# Patient Record
Sex: FEMALE | Race: WHITE | NOT HISPANIC OR LATINO | Employment: UNEMPLOYED | URBAN - NONMETROPOLITAN AREA
[De-identification: names, ages, dates, MRNs, and addresses within clinical notes are randomized per-mention and may not be internally consistent; named-entity substitution may affect disease eponyms.]

---

## 2017-01-03 ENCOUNTER — OFFICE VISIT (OUTPATIENT)
Dept: CARDIOLOGY | Facility: CLINIC | Age: 52
End: 2017-01-03

## 2017-01-03 VITALS
SYSTOLIC BLOOD PRESSURE: 152 MMHG | HEART RATE: 64 BPM | OXYGEN SATURATION: 96 % | WEIGHT: 192.2 LBS | DIASTOLIC BLOOD PRESSURE: 97 MMHG | HEIGHT: 66 IN | BODY MASS INDEX: 30.89 KG/M2

## 2017-01-03 DIAGNOSIS — I10 ESSENTIAL HYPERTENSION: ICD-10-CM

## 2017-01-03 DIAGNOSIS — R06.02 SHORTNESS OF BREATH: ICD-10-CM

## 2017-01-03 DIAGNOSIS — I82.409 DEEP VEIN THROMBOSIS (DVT) (HCC): ICD-10-CM

## 2017-01-03 DIAGNOSIS — I25.10 CORONARY ARTERY DISEASE INVOLVING NATIVE CORONARY ARTERY OF NATIVE HEART WITHOUT ANGINA PECTORIS: Primary | ICD-10-CM

## 2017-01-03 PROCEDURE — 99213 OFFICE O/P EST LOW 20 MIN: CPT | Performed by: PHYSICIAN ASSISTANT

## 2017-01-03 RX ORDER — FUROSEMIDE 20 MG/1
20 TABLET ORAL DAILY
Refills: 11 | COMMUNITY
Start: 2016-12-13 | End: 2020-04-06 | Stop reason: SDUPTHER

## 2017-01-03 RX ORDER — WARFARIN SODIUM 4 MG/1
4 TABLET ORAL
COMMUNITY
End: 2021-12-10 | Stop reason: SDUPTHER

## 2017-01-03 RX ORDER — WARFARIN SODIUM 6 MG/1
6 TABLET ORAL
COMMUNITY
End: 2017-03-28 | Stop reason: SDUPTHER

## 2017-01-03 RX ORDER — NITROGLYCERIN 0.4 MG/1
0.4 TABLET SUBLINGUAL
COMMUNITY
Start: 2015-12-09

## 2017-01-03 RX ORDER — MELOXICAM 15 MG/1
15 TABLET ORAL DAILY
COMMUNITY
Start: 2017-01-01

## 2017-01-03 RX ORDER — METOPROLOL TARTRATE 50 MG/1
50 TABLET, FILM COATED ORAL 2 TIMES DAILY
Refills: 12 | COMMUNITY
Start: 2016-12-21 | End: 2021-12-09 | Stop reason: SDUPTHER

## 2017-01-03 RX ORDER — GABAPENTIN 600 MG/1
600 TABLET ORAL 2 TIMES DAILY
COMMUNITY
Start: 2017-01-01 | End: 2019-12-10

## 2017-01-03 RX ORDER — ASPIRIN 81 MG
81 TABLET, DELAYED RELEASE (ENTERIC COATED) ORAL 2 TIMES DAILY
COMMUNITY
Start: 2013-08-01

## 2017-01-03 NOTE — MR AVS SNAPSHOT
Vannessa Contreras   1/3/2017 8:45 AM   Office Visit    Dept Phone:  [phone]   Encounter #:  [id number]    Provider:  KELLIE Ge   Department:  [facility] CARDIOLOGY                Your Full Care Plan              Today's Medication Changes          These changes are accurate as of: 1/3/17  9:37 AM.  If you have any questions, ask your nurse or doctor.               Medication(s)that have changed:     * warfarin 6 MG tablet   Commonly known as:  COUMADIN   Take 6 mg by mouth. On Monday's, Wed's, Sat's   What changed:  Another medication with the same name was removed. Continue taking this medication, and follow the directions you see here.   Changed by:  KELLIE Ge       * warfarin 4 MG tablet   Commonly known as:  COUMADIN   Take 4 mg by mouth. Takes 6 mg M/W/Sat and 4 mg all other days   What changed:  Another medication with the same name was removed. Continue taking this medication, and follow the directions you see here.   Changed by:  KELLIE Ge       * Notice:  This list has 2 medication(s) that are the same as other medications prescribed for you. Read the directions carefully, and ask your doctor or other care provider to review them with you.               Your Updated Medication List          This list is accurate as of: 1/3/17  9:37 AM.  Always use your most recent med list.                ASPIRIN LOW DOSE 81 MG EC tablet   Generic drug:  aspirin       atorvastatin 40 MG tablet   Commonly known as:  LIPITOR   TAKE 1 TABLET BY MOUTH EVERY NIGHT AT BEDTIME       EQL FISH OIL 1200 MG capsule       furosemide 20 MG tablet   Commonly known as:  LASIX       gabapentin 600 MG tablet   Commonly known as:  NEURONTIN       meloxicam 15 MG tablet   Commonly known as:  MOBIC       metoprolol tartrate 50 MG tablet   Commonly known as:  LOPRESSOR       NITROSTAT 0.4 MG SL tablet   Generic drug:  nitroglycerin       * warfarin 6 MG tablet    "  Commonly known as:  COUMADIN       * warfarin 4 MG tablet   Commonly known as:  COUMADIN       * Notice:  This list has 2 medication(s) that are the same as other medications prescribed for you. Read the directions carefully, and ask your doctor or other care provider to review them with you.            We Performed the Following     Protime-INR       You Were Diagnosed With        Codes Comments    Coronary artery disease involving native coronary artery of native heart without angina pectoris    -  Primary ICD-10-CM: I25.10  ICD-9-CM: 414.01     Deep vein thrombosis (DVT)     ICD-10-CM: I82.409  ICD-9-CM: 453.40     Shortness of breath     ICD-10-CM: R06.02  ICD-9-CM: 786.05     Essential hypertension     ICD-10-CM: I10  ICD-9-CM: 401.9       Instructions     None    Patient Instructions History      Upcoming Appointments     Visit Type Date Time Department    FOLLOW UP 1/3/2017  8:45 AM [facility]      [employer] Signup     Our records indicate that you have declined [employer] signup. If you would like to sign up for [employer], please email [email or web address] or call [phone] to obtain an activation code.             Other Info from Your Visit           Allergies     No Known Allergies      Reason for Visit     Follow-up           Vital Signs     Blood Pressure Pulse Height Weight Oxygen Saturation Body Mass Index    152/97 (BP Location: Left arm, Patient Position: Sitting) 64 66\" (167.6 cm) 192 lb 3.2 oz (87.2 kg) 96% 31.02 kg/m2    Smoking Status                   Current Every Day Smoker           Problems and Diagnoses Noted     Coronary artery disease involving native coronary artery of native heart without angina pectoris    High blood pressure    Shortness of breath    Blood clot in vein            "

## 2017-01-03 NOTE — LETTER
January 3, 2017     Jc Lewis MD  [address] [place] [id number]    Patient: Vannessa Contreras   YOB: 1965   Date of Visit: 1/3/2017       Dear Dr. Debbie MD:    Vannessa Contreras was in my office today. Below is a copy of my note.    If you have questions, please do not hesitate to call me. I look forward to following Vannessa along with you.         Sincerely,        KELLIE Mcdaniels        CC: No Recipients    Problem list     Subjective   Vannessa Contreras is a 51 y.o. female     Chief Complaint   Patient presents with   • Follow-up       HPI    1. CAD   1.1. Nonobstructive Cath 2007  2. Hyperlipidemia  3. Hypertension  4. Palpitations  5. PVD   5.1 Bilateral aortobifemoral bypass 6/2007; thrombectomy rt aortofemoral graft 11/2009  6.Tobacco Habituation, uses e-cigarettes also  7. HX of DVT on coumadin    Patient is a 51-year-old female that presents back to office for routine follow-up. She has been doing remarkably well. She expresses no chest pain or pressure at rest or with exertion. She has mild dyspnea but this is chronic with no progression. She denies PND orthopnea. Occasional palpitations but denies dizziness, presyncope or syncope. She does not claudicate. She has had no evidence of bleeding on Coumadin. Otherwise voices no complaints      Outpatient Encounter Prescriptions as of 1/3/2017   Medication Sig Dispense Refill   • aspirin (ASPIRIN LOW DOSE) 81 MG EC tablet Take  by mouth 2 (Two) Times a Day.     • atorvastatin (LIPITOR) 40 MG tablet TAKE 1 TABLET BY MOUTH EVERY NIGHT AT BEDTIME 30 tablet 0   • furosemide (LASIX) 20 MG tablet Daily.  11   • gabapentin (NEURONTIN) 600 MG tablet 2 (Two) Times a Day.     • meloxicam (MOBIC) 15 MG tablet Daily.     • metoprolol tartrate (LOPRESSOR) 50 MG tablet 2 (Two) Times a Day.  12   • nitroglycerin (NITROSTAT) 0.4 MG SL tablet Place  under the tongue.     • Omega-3 Fatty Acids (EQL FISH OIL) 1200 MG capsule Take  by mouth 2  (Two) Times a Day.     • warfarin (COUMADIN) 4 MG tablet Take 4 mg by mouth. Takes 6 mg M/W/Sat and 4 mg all other days     • warfarin (COUMADIN) 6 MG tablet Take 6 mg by mouth. On Monday's, Wed's, Sat's     • [DISCONTINUED] warfarin (COUMADIN) 4 MG tablet TAKE 1 TABLET BY MOUTH DAILY EXCEPT ON WEDNESDAYS AND SATURDAYS (Patient taking differently: No sig reported) 30 tablet 0     No facility-administered encounter medications on file as of 1/3/2017.        Review of patient's allergies indicates no known allergies.    Past Medical History   Diagnosis Date   • Arterial insufficiency    • Atherosclerosis of arteries of extremities    • CAD (coronary artery disease)    • DVT (deep venous thrombosis)      h.o - on coumadin    • Fatigue    • Hyperlipidemia    • Hypertension    • Palpitations    • PVD (peripheral vascular disease)    • SOB (shortness of breath)        Social History     Social History   • Marital status:      Spouse name: N/A   • Number of children: N/A   • Years of education: N/A     Occupational History   • Not on file.     Social History Main Topics   • Smoking status: Current Every Day Smoker     Packs/day: 1.00   • Smokeless tobacco: Not on file      Comment: also states former smoker 03/03/16   • Alcohol use Yes      Comment: Social    • Drug use: Not on file   • Sexual activity: Not on file     Other Topics Concern   • Not on file     Social History Narrative       Family History   Problem Relation Age of Onset   • Atrial fibrillation Mother    • Heart attack Father    • Diabetes Sister    • Hypertension Sister    • Stroke Maternal Grandmother    • Peripheral vascular disease Other        Review of Systems   Constitutional: Positive for fatigue.   HENT: Negative.    Eyes: Negative.    Respiratory: Positive for shortness of breath.    Cardiovascular: Positive for leg swelling (mildly). Negative for chest pain and palpitations.   Gastrointestinal: Positive for diarrhea.   Endocrine: Negative.   "  Genitourinary: Negative.    Musculoskeletal: Positive for arthralgias and myalgias.   Skin: Negative.    Allergic/Immunologic: Negative.    Neurological: Negative.    Hematological: Negative.    Psychiatric/Behavioral: Negative.        Objective     Visit Vitals   • /97 (BP Location: Left arm, Patient Position: Sitting)   • Pulse 64   • Ht 66\" (167.6 cm)   • Wt 192 lb 3.2 oz (87.2 kg)   • SpO2 96%   • BMI 31.02 kg/m2       Lab Results (most recent)     None          Physical Exam   Constitutional: She is oriented to person, place, and time. She appears well-developed and well-nourished. No distress.   HENT:   Head: Normocephalic and atraumatic.   Eyes: EOM are normal. Pupils are equal, round, and reactive to light.   Neck: No JVD present.   Cardiovascular: Normal rate, regular rhythm and normal heart sounds.  Exam reveals no gallop and no friction rub.    No murmur heard.  Pulmonary/Chest: Effort normal and breath sounds normal. No respiratory distress. She has no wheezes. She has no rales. She exhibits no tenderness.   Musculoskeletal: Normal range of motion. She exhibits no edema.   Neurological: She is alert and oriented to person, place, and time. No cranial nerve deficit.   Skin: Skin is warm and dry. No rash noted. No erythema. No pallor.   Psychiatric: She has a normal mood and affect. Her behavior is normal.   Nursing note and vitals reviewed.      Procedure   Procedures       Assessment/Plan     Problems Addressed this Visit        Cardiovascular and Mediastinum    Coronary artery disease involving native coronary artery of native heart without angina pectoris - Primary    Relevant Medications    metoprolol tartrate (LOPRESSOR) 50 MG tablet    nitroglycerin (NITROSTAT) 0.4 MG SL tablet    Other Relevant Orders    Comprehensive Metabolic Panel    Lipid Panel    Essential hypertension    Relevant Medications    furosemide (LASIX) 20 MG tablet    metoprolol tartrate (LOPRESSOR) 50 MG tablet    Other " Relevant Orders    Comprehensive Metabolic Panel    Lipid Panel       Respiratory    Shortness of breath    Relevant Orders    Comprehensive Metabolic Panel    Lipid Panel      Other Visit Diagnoses     Deep vein thrombosis (DVT)        Relevant Orders    Comprehensive Metabolic Panel    Lipid Panel              Recommendations  1. Patient doing well from cardiac standpoint. She denies symptoms of angina or failure. Dysrhythmic symptoms are only mild at this point. She has no claudication symptoms nor evidence of bleeding on Coumadin. We will give her a lab slip to recheck her liver enzymes and lipid status. Otherwise we will see her back for follow-up in 6 months. Follow-up primary as scheduled.

## 2017-01-03 NOTE — PROGRESS NOTES
Problem list     Subjective   Vannessa Contreras is a 51 y.o. female     Chief Complaint   Patient presents with   • Follow-up       HPI    1. CAD   1.1. Nonobstructive Cath 2007  2. Hyperlipidemia  3. Hypertension  4. Palpitations  5. PVD   5.1 Bilateral aortobifemoral bypass 6/2007; thrombectomy rt aortofemoral graft 11/2009  6.Tobacco Habituation, uses e-cigarettes also  7. HX of DVT on coumadin    Patient is a 51-year-old female that presents back to office for routine follow-up. She has been doing remarkably well. She expresses no chest pain or pressure at rest or with exertion. She has mild dyspnea but this is chronic with no progression. She denies PND orthopnea. Occasional palpitations but denies dizziness, presyncope or syncope. She does not claudicate. She has had no evidence of bleeding on Coumadin. Otherwise voices no complaints      Outpatient Encounter Prescriptions as of 1/3/2017   Medication Sig Dispense Refill   • aspirin (ASPIRIN LOW DOSE) 81 MG EC tablet Take  by mouth 2 (Two) Times a Day.     • atorvastatin (LIPITOR) 40 MG tablet TAKE 1 TABLET BY MOUTH EVERY NIGHT AT BEDTIME 30 tablet 0   • furosemide (LASIX) 20 MG tablet Daily.  11   • gabapentin (NEURONTIN) 600 MG tablet 2 (Two) Times a Day.     • meloxicam (MOBIC) 15 MG tablet Daily.     • metoprolol tartrate (LOPRESSOR) 50 MG tablet 2 (Two) Times a Day.  12   • nitroglycerin (NITROSTAT) 0.4 MG SL tablet Place  under the tongue.     • Omega-3 Fatty Acids (EQL FISH OIL) 1200 MG capsule Take  by mouth 2 (Two) Times a Day.     • warfarin (COUMADIN) 4 MG tablet Take 4 mg by mouth. Takes 6 mg M/W/Sat and 4 mg all other days     • warfarin (COUMADIN) 6 MG tablet Take 6 mg by mouth. On Monday's, Wed's, Sat's     • [DISCONTINUED] warfarin (COUMADIN) 4 MG tablet TAKE 1 TABLET BY MOUTH DAILY EXCEPT ON WEDNESDAYS AND SATURDAYS (Patient taking differently: No sig reported) 30 tablet 0     No facility-administered encounter medications on file as of  "1/3/2017.        Review of patient's allergies indicates no known allergies.    Past Medical History   Diagnosis Date   • Arterial insufficiency    • Atherosclerosis of arteries of extremities    • CAD (coronary artery disease)    • DVT (deep venous thrombosis)      h.o - on coumadin    • Fatigue    • Hyperlipidemia    • Hypertension    • Palpitations    • PVD (peripheral vascular disease)    • SOB (shortness of breath)        Social History     Social History   • Marital status:      Spouse name: N/A   • Number of children: N/A   • Years of education: N/A     Occupational History   • Not on file.     Social History Main Topics   • Smoking status: Current Every Day Smoker     Packs/day: 1.00   • Smokeless tobacco: Not on file      Comment: also states former smoker 03/03/16   • Alcohol use Yes      Comment: Social    • Drug use: Not on file   • Sexual activity: Not on file     Other Topics Concern   • Not on file     Social History Narrative       Family History   Problem Relation Age of Onset   • Atrial fibrillation Mother    • Heart attack Father    • Diabetes Sister    • Hypertension Sister    • Stroke Maternal Grandmother    • Peripheral vascular disease Other        Review of Systems   Constitutional: Positive for fatigue.   HENT: Negative.    Eyes: Negative.    Respiratory: Positive for shortness of breath.    Cardiovascular: Positive for leg swelling (mildly). Negative for chest pain and palpitations.   Gastrointestinal: Positive for diarrhea.   Endocrine: Negative.    Genitourinary: Negative.    Musculoskeletal: Positive for arthralgias and myalgias.   Skin: Negative.    Allergic/Immunologic: Negative.    Neurological: Negative.    Hematological: Negative.    Psychiatric/Behavioral: Negative.        Objective     Visit Vitals   • /97 (BP Location: Left arm, Patient Position: Sitting)   • Pulse 64   • Ht 66\" (167.6 cm)   • Wt 192 lb 3.2 oz (87.2 kg)   • SpO2 96%   • BMI 31.02 kg/m2       Lab " Results (most recent)     None          Physical Exam   Constitutional: She is oriented to person, place, and time. She appears well-developed and well-nourished. No distress.   HENT:   Head: Normocephalic and atraumatic.   Eyes: EOM are normal. Pupils are equal, round, and reactive to light.   Neck: No JVD present.   Cardiovascular: Normal rate, regular rhythm and normal heart sounds.  Exam reveals no gallop and no friction rub.    No murmur heard.  Pulmonary/Chest: Effort normal and breath sounds normal. No respiratory distress. She has no wheezes. She has no rales. She exhibits no tenderness.   Musculoskeletal: Normal range of motion. She exhibits no edema.   Neurological: She is alert and oriented to person, place, and time. No cranial nerve deficit.   Skin: Skin is warm and dry. No rash noted. No erythema. No pallor.   Psychiatric: She has a normal mood and affect. Her behavior is normal.   Nursing note and vitals reviewed.      Procedure   Procedures       Assessment/Plan     Problems Addressed this Visit        Cardiovascular and Mediastinum    Coronary artery disease involving native coronary artery of native heart without angina pectoris - Primary    Relevant Medications    metoprolol tartrate (LOPRESSOR) 50 MG tablet    nitroglycerin (NITROSTAT) 0.4 MG SL tablet    Other Relevant Orders    Comprehensive Metabolic Panel    Lipid Panel    Essential hypertension    Relevant Medications    furosemide (LASIX) 20 MG tablet    metoprolol tartrate (LOPRESSOR) 50 MG tablet    Other Relevant Orders    Comprehensive Metabolic Panel    Lipid Panel       Respiratory    Shortness of breath    Relevant Orders    Comprehensive Metabolic Panel    Lipid Panel      Other Visit Diagnoses     Deep vein thrombosis (DVT)        Relevant Orders    Comprehensive Metabolic Panel    Lipid Panel              Recommendations  1. Patient doing well from cardiac standpoint. She denies symptoms of angina or failure. Dysrhythmic symptoms  are only mild at this point. She has no claudication symptoms nor evidence of bleeding on Coumadin. We will give her a lab slip to recheck her liver enzymes and lipid status. Otherwise we will see her back for follow-up in 6 months. Follow-up primary as scheduled.

## 2017-01-05 ENCOUNTER — DOCUMENTATION (OUTPATIENT)
Dept: CARDIOLOGY | Facility: CLINIC | Age: 52
End: 2017-01-05

## 2017-01-05 NOTE — PROGRESS NOTES
Called to remind patient her INR is past due and stated she was going next week to get it drawn because she has other labs to get drawn. PH,LPN

## 2017-01-10 ENCOUNTER — ANTICOAGULATION VISIT (OUTPATIENT)
Dept: CARDIOLOGY | Facility: CLINIC | Age: 52
End: 2017-01-10

## 2017-01-10 LAB — INR PPP: 4.81 (ref 2–3)

## 2017-01-10 NOTE — PROGRESS NOTES
Per Ricardo Rhodes, PAC hold x 2 days then resume previous dosing and recheck level in 1-2 weeks. Patient aware, verbalized ok. PH,LPN

## 2017-01-25 ENCOUNTER — ANTICOAGULATION VISIT (OUTPATIENT)
Dept: CARDIOLOGY | Facility: CLINIC | Age: 52
End: 2017-01-25

## 2017-01-25 LAB — INR PPP: 2 (ref 2–3)

## 2017-01-25 NOTE — PROGRESS NOTES
Per Ricardo Rhodes, PAC no dosage change, recheck level in 2 weeks. Patient aware, verbalized ok. PH,LPN

## 2017-02-23 ENCOUNTER — DOCUMENTATION (OUTPATIENT)
Dept: CARDIOLOGY | Facility: CLINIC | Age: 52
End: 2017-02-23

## 2017-02-27 ENCOUNTER — ANTICOAGULATION VISIT (OUTPATIENT)
Dept: CARDIOLOGY | Facility: CLINIC | Age: 52
End: 2017-02-27

## 2017-02-27 LAB — INR PPP: 2.1 (ref 2–3)

## 2017-02-27 NOTE — PROGRESS NOTES
Per Ricardo Rhodes, PAC no dosage change, recheck level in 3 weeks. Patient aware, verbalized ok. PH,LPN

## 2017-03-07 DIAGNOSIS — I82.409 DEEP VEIN THROMBOSIS (DVT) OF LOWER EXTREMITY, UNSPECIFIED CHRONICITY, UNSPECIFIED LATERALITY, UNSPECIFIED VEIN (HCC): Primary | ICD-10-CM

## 2017-03-28 DIAGNOSIS — I82.409 DEEP VEIN THROMBOSIS (DVT) OF LOWER EXTREMITY, UNSPECIFIED CHRONICITY, UNSPECIFIED LATERALITY, UNSPECIFIED VEIN (HCC): Primary | ICD-10-CM

## 2017-03-28 RX ORDER — WARFARIN SODIUM 6 MG/1
TABLET ORAL
Qty: 30 TABLET | Refills: 5 | Status: SHIPPED | OUTPATIENT
Start: 2017-03-28 | End: 2021-12-10 | Stop reason: SDUPTHER

## 2017-03-29 ENCOUNTER — ANTICOAGULATION VISIT (OUTPATIENT)
Dept: CARDIOLOGY | Facility: CLINIC | Age: 52
End: 2017-03-29

## 2017-03-29 LAB — INR PPP: 3.5 (ref 2–3)

## 2017-04-26 ENCOUNTER — ANTICOAGULATION VISIT (OUTPATIENT)
Dept: CARDIOLOGY | Facility: CLINIC | Age: 52
End: 2017-04-26

## 2017-04-26 LAB — INR PPP: 5.5 (ref 2–3)

## 2017-04-26 NOTE — PROGRESS NOTES
Patient is on augmentin 875 mg po bid and cough syrup since last Thursday and is to take it x 10 days. Per Ricardo Rhodes, PAC hold x 2 days then resume previous dosing and recheck level 3 days after last abx. Dose ( Wed.). Patient aware, verbalized ok. PH,LPN

## 2017-05-03 LAB — INR PPP: 2.5 (ref 2–3)

## 2017-05-04 ENCOUNTER — ANTICOAGULATION VISIT (OUTPATIENT)
Dept: CARDIOLOGY | Facility: CLINIC | Age: 52
End: 2017-05-04

## 2017-05-19 LAB — INR PPP: 3 (ref 2–3)

## 2017-05-22 ENCOUNTER — ANTICOAGULATION VISIT (OUTPATIENT)
Dept: CARDIOLOGY | Facility: CLINIC | Age: 52
End: 2017-05-22

## 2017-06-02 LAB — INR PPP: 3 (ref 2–3)

## 2017-06-05 ENCOUNTER — ANTICOAGULATION VISIT (OUTPATIENT)
Dept: CARDIOLOGY | Facility: CLINIC | Age: 52
End: 2017-06-05

## 2017-06-05 NOTE — PROGRESS NOTES
Per Ricardo Rhodes, PAC no dosage change, recheck level in 2-3 weeks. Verbal orders read back and verified by RONALDO Pak. Patient aware, verbalized ok. PH,LPN

## 2017-06-29 ENCOUNTER — ANTICOAGULATION VISIT (OUTPATIENT)
Dept: CARDIOLOGY | Facility: CLINIC | Age: 52
End: 2017-06-29

## 2017-06-29 LAB — INR PPP: 4.7 (ref 2–3)

## 2017-06-29 NOTE — PROGRESS NOTES
Per Ricardo Rhodes, PAC hold x 2 days then resume previous dosing and recheck level in 2 weeks. Verbal orders read back and verified by RONALDO Pak. Patient aware, verbalized ok. PH,LPN

## 2017-07-03 ENCOUNTER — OFFICE VISIT (OUTPATIENT)
Dept: CARDIOLOGY | Facility: CLINIC | Age: 52
End: 2017-07-03

## 2017-07-03 VITALS
HEIGHT: 66 IN | WEIGHT: 190.8 LBS | BODY MASS INDEX: 30.67 KG/M2 | SYSTOLIC BLOOD PRESSURE: 118 MMHG | DIASTOLIC BLOOD PRESSURE: 75 MMHG | OXYGEN SATURATION: 98 % | HEART RATE: 62 BPM

## 2017-07-03 DIAGNOSIS — I25.10 CORONARY ARTERY DISEASE INVOLVING NATIVE CORONARY ARTERY OF NATIVE HEART WITHOUT ANGINA PECTORIS: ICD-10-CM

## 2017-07-03 DIAGNOSIS — R09.89 CAROTID BRUIT, UNSPECIFIED LATERALITY: ICD-10-CM

## 2017-07-03 DIAGNOSIS — R06.02 SHORTNESS OF BREATH: Primary | ICD-10-CM

## 2017-07-03 DIAGNOSIS — R07.9 CHEST PAIN, UNSPECIFIED TYPE: ICD-10-CM

## 2017-07-03 PROCEDURE — 99214 OFFICE O/P EST MOD 30 MIN: CPT | Performed by: PHYSICIAN ASSISTANT

## 2017-07-03 NOTE — PROGRESS NOTES
"Problem list     Subjective   Vannessa Contreras is a 51 y.o. female     Chief Complaint   Patient presents with   • Follow-up     presents as a follow up       HPI    1. CAD   1.1. Nonobstructive Cath 2007  2. Hyperlipidemia  3. Hypertension  4. Palpitations  5. PVD   5.1 Bilateral aortobifemoral bypass 6/2007; thrombectomy rt aortofemoral graft 11/2009  6.Tobacco Habituation, uses e-cigarettes also  7. HX of DVT on coumadin    Patient is a 51-year-old female that presents back for routine cardiac follow-up. She has done remarkably well. She has chronic chest discomfort and describes an atypically. For instance, she gets a sharp pain or a \"twinge\" in her chest that last for seconds and subside. It is atypical for angina. She has mild to moderate dyspnea and she feels that is worsened. She describes more wheezing recently. She denies PND orthopnea. She does not palpitate or have dysrhythmic symptoms. Otherwise, she is doing well    Outpatient Encounter Prescriptions as of 7/3/2017   Medication Sig Dispense Refill   • aspirin (ASPIRIN LOW DOSE) 81 MG EC tablet Take 81 mg by mouth 2 (Two) Times a Day.     • atorvastatin (LIPITOR) 40 MG tablet TAKE 1 TABLET BY MOUTH EVERY NIGHT AT BEDTIME 30 tablet 0   • furosemide (LASIX) 20 MG tablet Take 20 mg by mouth Daily.  11   • gabapentin (NEURONTIN) 600 MG tablet Take 600 mg by mouth 2 (Two) Times a Day.     • meloxicam (MOBIC) 15 MG tablet Take 15 mg by mouth Daily.     • metoprolol tartrate (LOPRESSOR) 50 MG tablet Take 50 mg by mouth 2 (Two) Times a Day.  12   • nitroglycerin (NITROSTAT) 0.4 MG SL tablet Place 0.4 mg under the tongue Every 5 (Five) Minutes As Needed.     • Omega-3 Fatty Acids (EQL FISH OIL) 1200 MG capsule Take 1 tablet by mouth 2 (Two) Times a Day.     • warfarin (COUMADIN) 4 MG tablet Take 4 mg by mouth. Takes 6 mg M/W/Sat and 4 mg all other days     • warfarin (COUMADIN) 6 MG tablet TAKE 1 TABLET BY MOUTH EVERY WEDNESDAY 30 tablet 5     No " "facility-administered encounter medications on file as of 7/3/2017.        Review of patient's allergies indicates no known allergies.    Past Medical History:   Diagnosis Date   • Arterial insufficiency    • Atherosclerosis of arteries of extremities    • CAD (coronary artery disease)    • DVT (deep venous thrombosis)     h.o - on coumadin    • Fatigue    • Hyperlipidemia    • Hypertension    • Palpitations    • PVD (peripheral vascular disease)    • SOB (shortness of breath)        Social History     Social History   • Marital status:      Spouse name: N/A   • Number of children: N/A   • Years of education: N/A     Occupational History   • Not on file.     Social History Main Topics   • Smoking status: Current Every Day Smoker     Packs/day: 1.00   • Smokeless tobacco: Not on file      Comment: also states former smoker 03/03/16   • Alcohol use Yes      Comment: Social    • Drug use: Not on file   • Sexual activity: Not on file     Other Topics Concern   • Not on file     Social History Narrative       Family History   Problem Relation Age of Onset   • Atrial fibrillation Mother    • Heart attack Father    • Diabetes Sister    • Hypertension Sister    • Stroke Maternal Grandmother    • Peripheral vascular disease Other        Review of Systems   Constitutional: Positive for fatigue.   HENT: Positive for sinus pressure.    Eyes: Negative.    Respiratory: Positive for shortness of breath.    Cardiovascular: Positive for chest pain.   Gastrointestinal: Negative.    Endocrine: Negative.    Genitourinary: Positive for frequency.   Musculoskeletal: Positive for arthralgias.   Skin: Negative.    Allergic/Immunologic: Positive for environmental allergies.   Neurological: Negative.    Hematological: Negative.    Psychiatric/Behavioral: Negative.        Objective     /75 (BP Location: Left arm, Patient Position: Sitting)  Pulse 62  Ht 66\" (167.6 cm)  Wt 190 lb 12.8 oz (86.5 kg)  SpO2 98%  BMI 30.8 " kg/m2    Lab Results (most recent)     None          Physical Exam   Constitutional: She is oriented to person, place, and time. She appears well-developed and well-nourished. No distress.   HENT:   Head: Normocephalic and atraumatic.   Eyes: EOM are normal. Pupils are equal, round, and reactive to light.   Neck: No JVD present.   Cardiovascular: Normal rate, regular rhythm and normal heart sounds.  Exam reveals no gallop and no friction rub.    No murmur heard.  Pulmonary/Chest: Effort normal and breath sounds normal. No respiratory distress. She has no wheezes. She has no rales. She exhibits no tenderness.   Abdominal: Soft. She exhibits no distension.   Musculoskeletal: Normal range of motion. She exhibits no edema.   Neurological: She is alert and oriented to person, place, and time. No cranial nerve deficit.   Skin: Skin is warm and dry. No rash noted. No erythema. No pallor.   Psychiatric: She has a normal mood and affect. Her behavior is normal.   Nursing note and vitals reviewed.      Procedure   Procedures       Assessment/Plan     Problems Addressed this Visit        Cardiovascular and Mediastinum    Coronary artery disease involving native coronary artery of native heart without angina pectoris    Relevant Orders    Pulmonary Function Test    US Carotid Bilateral       Respiratory    Shortness of breath - Primary    Relevant Orders    Pulmonary Function Test    US Carotid Bilateral       Nervous and Auditory    Chest pain    Relevant Orders    Pulmonary Function Test    US Carotid Bilateral      Other Visit Diagnoses     Carotid bruit, unspecified laterality        Relevant Orders    Pulmonary Function Test    US Carotid Bilateral              Recommendations  1. Patient has chronic dyspnea and complaints of more wheezing. She has never had pulmonary function studies performed. I would like to set up at Dr. Mcgrath's office to look for any evidence of restrictive, obstructive, or reactive airway  disease.  2. Chest pain is atypical at this time and only had very minimal atherosclerosis on last catheterization. I do not feel further workup is indicated.  3. She is on appropriate medications for coronary disease including aspirin and statin therapy.  4. She is on chronic anticoagulation for DVT. She has had no evidence of bleeding.  5. She request a carotid duplex for reevaluation. We will see her back for follow-up after testing. Follow-up primary as scheduled

## 2017-07-19 ENCOUNTER — ANTICOAGULATION VISIT (OUTPATIENT)
Dept: CARDIOLOGY | Facility: CLINIC | Age: 52
End: 2017-07-19

## 2017-07-19 LAB — INR PPP: 2.2 (ref 2–3)

## 2017-07-25 ENCOUNTER — HOSPITAL ENCOUNTER (OUTPATIENT)
Dept: CARDIOLOGY | Facility: HOSPITAL | Age: 52
Discharge: HOME | End: 2017-07-25
Admitting: PHYSICIAN ASSISTANT

## 2017-07-25 PROCEDURE — 93880 EXTRACRANIAL BILAT STUDY: CPT | Performed by: INTERNAL MEDICINE

## 2017-07-25 PROCEDURE — 93880 EXTRACRANIAL BILAT STUDY: CPT

## 2017-08-10 LAB — INR PPP: 3.7 (ref 2–3)

## 2017-08-11 ENCOUNTER — ANTICOAGULATION VISIT (OUTPATIENT)
Dept: CARDIOLOGY | Facility: CLINIC | Age: 52
End: 2017-08-11

## 2017-08-11 ENCOUNTER — TELEPHONE (OUTPATIENT)
Dept: CARDIOLOGY | Facility: CLINIC | Age: 52
End: 2017-08-11

## 2017-08-11 DIAGNOSIS — I82.4Y9 DEEP VEIN THROMBOSIS (DVT) OF PROXIMAL LOWER EXTREMITY, UNSPECIFIED CHRONICITY, UNSPECIFIED LATERALITY (HCC): Primary | ICD-10-CM

## 2017-08-11 NOTE — PROGRESS NOTES
Per Ricardo Rhodes, PAC hold today's dose then resume previous dosing and recheck level in 2 weeks. Verbal orders read back and verified by Ricardo Rhodes, PAC. Patient aware, verbalized ok. PH,LPN

## 2017-09-12 ENCOUNTER — DOCUMENTATION (OUTPATIENT)
Dept: CARDIOLOGY | Facility: CLINIC | Age: 52
End: 2017-09-12

## 2017-09-12 NOTE — PROGRESS NOTES
Called and reminded patient INR is past due. Stated she would try and go Friday am and asked that I  Send standing order to [facility] since [facility] closed. Order faxed. CATIE,LPN

## 2017-09-15 LAB — INR PPP: 0.99 (ref 2–3)

## 2017-09-18 ENCOUNTER — ANTICOAGULATION VISIT (OUTPATIENT)
Dept: CARDIOLOGY | Facility: CLINIC | Age: 52
End: 2017-09-18

## 2017-09-18 NOTE — PROGRESS NOTES
NO DOSAGE CHANGE AND RECHECK LEVEL IN 2 WEEKS PER TIERA GARCIA, PAC. INR ADDRESSED WITH PATIENT BY KATIE COLINDRES. PH,LPN

## 2017-09-29 LAB — INR PPP: 1.26 (ref 2–3)

## 2017-10-02 ENCOUNTER — ANTICOAGULATION VISIT (OUTPATIENT)
Dept: CARDIOLOGY | Facility: CLINIC | Age: 52
End: 2017-10-02

## 2017-10-02 NOTE — PROGRESS NOTES
PER TIERA GARCIA, PAC CHANGE TO 6 MG M,W,F,SAT AND 4 MG ALL OTHER DAYS AND RECHECK LEVEL IN 7-10 DAYS. ALL VERBAL ORDERS PER RONALDO VARELA AND ADDRESSED WITH HIM BY KATIE COLINDRES. PATIENT WAS NOTIFIED. PH,LPN

## 2017-10-06 ENCOUNTER — ANTICOAGULATION VISIT (OUTPATIENT)
Dept: CARDIOLOGY | Facility: CLINIC | Age: 52
End: 2017-10-06

## 2017-10-06 LAB — INR PPP: 3.09 (ref 2–3)

## 2017-10-06 NOTE — PROGRESS NOTES
NO DOSAGE CHANGE AND RECHECK LEVEL IN 1-2 WEEKS PER TIERA GARCIA, PAC. VERBAL ORDERS READ BACK AND VERIFIED BY TIERA GARCIA PAC. PATIENT AWARE VERBALIZED OK. PH,LPN

## 2017-10-23 ENCOUNTER — ANTICOAGULATION VISIT (OUTPATIENT)
Dept: CARDIOLOGY | Facility: CLINIC | Age: 52
End: 2017-10-23

## 2017-10-23 LAB — INR PPP: 2.6 (ref 2–3)

## 2017-10-23 NOTE — PROGRESS NOTES
NO DOSAGE CHANGE AND RECHECK LEVEL IN 2-3 WEEKS PER TIERA GARCIA, PAC. VERBAL ORDERS READ BACK AND VERIFIED BY TIERA GARCIA PAC. PATIENT AWARE VERBALIZED OK. PH,LPN

## 2017-11-22 LAB — INR PPP: 2.44 (ref 2–3)

## 2017-11-26 ENCOUNTER — ANTICOAGULATION VISIT (OUTPATIENT)
Dept: CARDIOLOGY | Facility: CLINIC | Age: 52
End: 2017-11-26

## 2017-11-26 NOTE — PROGRESS NOTES
NO DOSAGE CHANGE AND RECHECK LEVEL IN 2-3 WEEKS PER RONALDO VARELA. VERBAL ORDERS READ BACK AND VERIFIED BY RONALDO VARELA. ABOVE ADDRESSED WITH RONALDO VARELA BY KATIE COLINDRES. PH,LPN

## 2017-12-14 ENCOUNTER — ANTICOAGULATION VISIT (OUTPATIENT)
Dept: CARDIOLOGY | Facility: CLINIC | Age: 52
End: 2017-12-14

## 2017-12-14 LAB
INR PPP: 1.86 (ref 2–3)
INR PPP: 3.9 (ref 2–3)

## 2017-12-14 NOTE — PROGRESS NOTES
PER TIERA GARCIA, PAC HOLD TODAYS DOSE, ONLY TAKE 3 MG VANNESSA. THEN RESUME PREVIOUS DOSING  AND RECHECK LEVEL IN 2WEEKS. VERBAL ORDERS READ BACK AND VERIFIED BY RONALDO VARELA. PATIENT AWARE, VERBALIZED OK. PH,LPN

## 2018-01-05 LAB — INR PPP: 3.48 (ref 2–3)

## 2018-01-08 ENCOUNTER — ANTICOAGULATION VISIT (OUTPATIENT)
Dept: CARDIOLOGY | Facility: CLINIC | Age: 53
End: 2018-01-08

## 2018-01-08 NOTE — PROGRESS NOTES
PER TIERA GARCIA, PAC HOLD TODAY'S DOSE THEN RESUME PREVIOUS DOSING AND RECHECK LEVEL IN 2 WEEKS. VERBAL ORDERS READ BACK AND VERIFIED BY TIERA GARCIA, PAC. PATIENT AWARE, VERBALIZED OK. PH,LPN

## 2018-01-18 ENCOUNTER — ANTICOAGULATION VISIT (OUTPATIENT)
Dept: CARDIOLOGY | Facility: CLINIC | Age: 53
End: 2018-01-18

## 2018-01-18 LAB — INR PPP: 3.46 (ref 2–3)

## 2018-01-18 NOTE — PROGRESS NOTES
PER TIERA GARCIA, PAC CHANGE TO 6 MG M/W/F AND 4 MG ALL OTHER DAYS AND RECHECK LEVEL IN 2 WEEKS. VERBAL ORDERS READ BACK AND VERIFIED BY RONALDO VARELA. PATIENT AWARE, VERBALIZED OK. PH,LPN

## 2018-01-23 DIAGNOSIS — I82.90 DEEP VEIN THROMBOSIS (DVT) OF NON-EXTREMITY VEIN, UNSPECIFIED CHRONICITY: Primary | ICD-10-CM

## 2018-02-16 LAB
INR PPP: 1.53 (ref 2–3)
INR PPP: 1.59 (ref 2–3)

## 2018-02-19 ENCOUNTER — ANTICOAGULATION VISIT (OUTPATIENT)
Dept: CARDIOLOGY | Facility: CLINIC | Age: 53
End: 2018-02-19

## 2018-02-28 ENCOUNTER — ANTICOAGULATION VISIT (OUTPATIENT)
Dept: CARDIOLOGY | Facility: CLINIC | Age: 53
End: 2018-02-28

## 2018-02-28 LAB — INR PPP: 2.46 (ref 2–3)

## 2018-02-28 NOTE — PROGRESS NOTES
NO DOSAGE CHANGE AND RECHECK LEVEL IN 2 WEEKS PER TIERA GARCIA, PAC. VERBAL ORDERS READ BACK AND VERIFIED BY TIERA GARCIA, PAC. PATIENT AWARE VERBALIZED OK. PH,LPN

## 2018-03-29 ENCOUNTER — ANTICOAGULATION VISIT (OUTPATIENT)
Dept: CARDIOLOGY | Facility: CLINIC | Age: 53
End: 2018-03-29

## 2018-03-29 LAB — INR PPP: 1.93 (ref 2–3)

## 2018-04-19 ENCOUNTER — ANTICOAGULATION VISIT (OUTPATIENT)
Dept: CARDIOLOGY | Facility: CLINIC | Age: 53
End: 2018-04-19

## 2018-04-19 LAB — INR PPP: 2.69 (ref 2–3)

## 2018-04-19 NOTE — PROGRESS NOTES
NO DOSAGE CHANGE AND RECHECK LEVEL IN 1 MO. PER TIERA GARCIA, PAC. VERBAL ORDERS READ BACK AND VERIFIED BY TIERA GARCIA, PAC. PATIENT AWARE VERBALIZED OK. PH,LPN

## 2018-05-22 LAB — INR PPP: 2.85 (ref 2–3)

## 2018-06-06 ENCOUNTER — ANTICOAGULATION VISIT (OUTPATIENT)
Dept: CARDIOLOGY | Facility: CLINIC | Age: 53
End: 2018-06-06

## 2018-06-06 NOTE — PROGRESS NOTES
INR RESULTS JUST RECEIVED IN OFFICE TODAY. NO DOSAGE CHANGE AND RECHECK LEVEL IN 1 MO.PER TIERA GARCIA, PAC. VERBAL ORDERS READ BACK AND VERIFIED BY TIERA GARCIA, PAC. PATIENT AWARE VERBALIZED OK. CATIE,LPN

## 2018-06-26 ENCOUNTER — ANTICOAGULATION VISIT (OUTPATIENT)
Dept: CARDIOLOGY | Facility: CLINIC | Age: 53
End: 2018-06-26

## 2018-06-26 ENCOUNTER — DOCUMENTATION (OUTPATIENT)
Dept: CARDIOLOGY | Facility: CLINIC | Age: 53
End: 2018-06-26

## 2018-06-26 LAB — INR PPP: 2.54 (ref 2–3)

## 2018-06-26 NOTE — PROGRESS NOTES
NO DOSAGE CHANGE AND RECHECK LEVEL IN 1 MO. PER MARY BRADLEY, PAC. VERBAL ORDERS READ BACK AND VERIFIED BY MARY BRADLEY, PAC. PATIENT AWARE VERBALIZED OK. PH,LPN

## 2018-06-26 NOTE — PROGRESS NOTES
Cardiac clearance req was received in office from  [facility], . This was reviewed by Eren Webster PA-C and [facility] back. [facility]

## 2018-07-03 ENCOUNTER — TELEPHONE (OUTPATIENT)
Dept: CARDIOLOGY | Facility: CLINIC | Age: 53
End: 2018-07-03

## 2018-07-03 DIAGNOSIS — I82.90 DEEP VEIN THROMBOSIS (DVT) OF NON-EXTREMITY VEIN, UNSPECIFIED CHRONICITY: Primary | ICD-10-CM

## 2018-07-03 NOTE — TELEPHONE ENCOUNTER
CURRENT STANDING ORDER EXPIRING. OK PER TIERA GARCIA, PAC TO SEND NEW STANDING ORDER. NEW ORDER FAXED TO [facility]. CATIE,LPN

## 2018-07-18 ENCOUNTER — TELEPHONE (OUTPATIENT)
Dept: CARDIOLOGY | Facility: CLINIC | Age: 53
End: 2018-07-18

## 2018-07-18 LAB — INR PPP: 2.29 (ref 2–3)

## 2018-07-19 ENCOUNTER — TELEPHONE (OUTPATIENT)
Dept: CARDIOLOGY | Facility: CLINIC | Age: 53
End: 2018-07-19

## 2018-07-25 ENCOUNTER — ANTICOAGULATION VISIT (OUTPATIENT)
Dept: CARDIOLOGY | Facility: CLINIC | Age: 53
End: 2018-07-25

## 2018-07-25 NOTE — PROGRESS NOTES
No dosage Change and recheck level in 1 month. Per Ricardo Rhodes, PAC. Verbal orders read back and verified by Ricardo Rhodes PAC. I have made multiple attempts to contact patient with no answer. There is no way to LM. Made Amna cherry LPN aware LORRAINE/KATIE

## 2018-10-23 ENCOUNTER — DOCUMENTATION (OUTPATIENT)
Dept: CARDIOLOGY | Facility: CLINIC | Age: 53
End: 2018-10-23

## 2018-10-23 ENCOUNTER — ANTICOAGULATION VISIT (OUTPATIENT)
Dept: CARDIOLOGY | Facility: CLINIC | Age: 53
End: 2018-10-23

## 2018-10-23 LAB — INR PPP: 1.79 (ref 2–3)

## 2018-10-23 NOTE — PROGRESS NOTES
PER TIERA GARCIA, PAC TAKE 6 MG TOTAL TODAY THEN RESUME PREVIOUS DOSING AND RECHECK LEVEL IN 2-3 WEEKS. VERBAL ORDERS READ BACK AND VERIFIED BY RONALDO VARELA. PATIENT AWARE, VERBALIZED OK. PH,LPN

## 2018-12-17 ENCOUNTER — ANTICOAGULATION VISIT (OUTPATIENT)
Dept: CARDIOLOGY | Facility: CLINIC | Age: 53
End: 2018-12-17

## 2018-12-17 LAB — INR PPP: 2.35 (ref 2–3)

## 2018-12-17 NOTE — PROGRESS NOTES
NO DOSAGE CHANGE AND RECHECK LEVEL IN 1 MO. PER TIERA GARCIA, PAC. VERBAL ORDERS READ BACK AND VERIFIED BY TIERA GARCIA, PAC. PATIENT'S  AWARE VERBALIZED OK. PH,LPN

## 2019-01-07 ENCOUNTER — TELEPHONE (OUTPATIENT)
Dept: CARDIOLOGY | Facility: CLINIC | Age: 54
End: 2019-01-07

## 2019-01-07 DIAGNOSIS — I82.409 ACUTE DEEP VEIN THROMBOSIS (DVT) OF LOWER EXTREMITY, UNSPECIFIED LATERALITY, UNSPECIFIED VEIN (HCC): Primary | ICD-10-CM

## 2019-01-07 NOTE — TELEPHONE ENCOUNTER
CURRENT INR STANDING ORDER  PER [facility], REQUESTING NEW STANDING ORDER. OM PER TIERA GARCIA, PAC TO SEND NEW STANDING ORDER. ORDER FAXED. PH,LPN

## 2019-01-18 ENCOUNTER — RESULTS ENCOUNTER (OUTPATIENT)
Dept: CARDIOLOGY | Facility: CLINIC | Age: 54
End: 2019-01-18

## 2019-01-18 DIAGNOSIS — I82.409 ACUTE DEEP VEIN THROMBOSIS (DVT) OF LOWER EXTREMITY, UNSPECIFIED LATERALITY, UNSPECIFIED VEIN (HCC): ICD-10-CM

## 2019-01-18 LAB — INR PPP: 2.63 (ref 2–3)

## 2019-01-21 ENCOUNTER — ANTICOAGULATION VISIT (OUTPATIENT)
Dept: CARDIOLOGY | Facility: CLINIC | Age: 54
End: 2019-01-21

## 2019-02-01 ENCOUNTER — RESULTS ENCOUNTER (OUTPATIENT)
Dept: CARDIOLOGY | Facility: CLINIC | Age: 54
End: 2019-02-01

## 2019-02-01 DIAGNOSIS — I82.409 ACUTE DEEP VEIN THROMBOSIS (DVT) OF LOWER EXTREMITY, UNSPECIFIED LATERALITY, UNSPECIFIED VEIN (HCC): ICD-10-CM

## 2019-02-15 ENCOUNTER — RESULTS ENCOUNTER (OUTPATIENT)
Dept: CARDIOLOGY | Facility: CLINIC | Age: 54
End: 2019-02-15

## 2019-02-15 DIAGNOSIS — I82.409 ACUTE DEEP VEIN THROMBOSIS (DVT) OF LOWER EXTREMITY, UNSPECIFIED LATERALITY, UNSPECIFIED VEIN (HCC): ICD-10-CM

## 2019-02-28 LAB — INR PPP: 2.21 (ref 2–3)

## 2019-03-01 ENCOUNTER — RESULTS ENCOUNTER (OUTPATIENT)
Dept: CARDIOLOGY | Facility: CLINIC | Age: 54
End: 2019-03-01

## 2019-03-01 ENCOUNTER — ANTICOAGULATION VISIT (OUTPATIENT)
Dept: CARDIOLOGY | Facility: CLINIC | Age: 54
End: 2019-03-01

## 2019-03-01 DIAGNOSIS — I82.409 ACUTE DEEP VEIN THROMBOSIS (DVT) OF LOWER EXTREMITY, UNSPECIFIED LATERALITY, UNSPECIFIED VEIN (HCC): ICD-10-CM

## 2019-03-15 ENCOUNTER — RESULTS ENCOUNTER (OUTPATIENT)
Dept: CARDIOLOGY | Facility: CLINIC | Age: 54
End: 2019-03-15

## 2019-03-15 DIAGNOSIS — I82.409 ACUTE DEEP VEIN THROMBOSIS (DVT) OF LOWER EXTREMITY, UNSPECIFIED LATERALITY, UNSPECIFIED VEIN (HCC): ICD-10-CM

## 2019-03-29 ENCOUNTER — RESULTS ENCOUNTER (OUTPATIENT)
Dept: CARDIOLOGY | Facility: CLINIC | Age: 54
End: 2019-03-29

## 2019-03-29 DIAGNOSIS — I82.409 ACUTE DEEP VEIN THROMBOSIS (DVT) OF LOWER EXTREMITY, UNSPECIFIED LATERALITY, UNSPECIFIED VEIN (HCC): ICD-10-CM

## 2019-04-12 ENCOUNTER — RESULTS ENCOUNTER (OUTPATIENT)
Dept: CARDIOLOGY | Facility: CLINIC | Age: 54
End: 2019-04-12

## 2019-04-12 DIAGNOSIS — I82.409 ACUTE DEEP VEIN THROMBOSIS (DVT) OF LOWER EXTREMITY, UNSPECIFIED LATERALITY, UNSPECIFIED VEIN (HCC): ICD-10-CM

## 2019-04-23 ENCOUNTER — ANTICOAGULATION VISIT (OUTPATIENT)
Dept: CARDIOLOGY | Facility: CLINIC | Age: 54
End: 2019-04-23

## 2019-04-23 LAB — INR PPP: 1.49 (ref 2–3)

## 2019-04-23 NOTE — PROGRESS NOTES
DENIES BEING ON ANY ABX'S, MISSED DOSES ETC. NO DOSAGE CHANGE AND RECHECK LEVEL IN 2 WEEKS PER TIERA GARCIA, PAC. VERBAL ORDERS READ BACK AND VERIFIED BY TIERA GARCIA, PAC. PATIENT AWARE VERBALIZED OK. PH,LPN

## 2019-04-26 ENCOUNTER — RESULTS ENCOUNTER (OUTPATIENT)
Dept: CARDIOLOGY | Facility: CLINIC | Age: 54
End: 2019-04-26

## 2019-04-26 DIAGNOSIS — I82.409 ACUTE DEEP VEIN THROMBOSIS (DVT) OF LOWER EXTREMITY, UNSPECIFIED LATERALITY, UNSPECIFIED VEIN (HCC): ICD-10-CM

## 2019-05-10 ENCOUNTER — RESULTS ENCOUNTER (OUTPATIENT)
Dept: CARDIOLOGY | Facility: CLINIC | Age: 54
End: 2019-05-10

## 2019-05-10 DIAGNOSIS — I82.409 ACUTE DEEP VEIN THROMBOSIS (DVT) OF LOWER EXTREMITY, UNSPECIFIED LATERALITY, UNSPECIFIED VEIN (HCC): ICD-10-CM

## 2019-05-13 ENCOUNTER — ANTICOAGULATION VISIT (OUTPATIENT)
Dept: CARDIOLOGY | Facility: CLINIC | Age: 54
End: 2019-05-13

## 2019-05-13 LAB — INR PPP: 2.87 (ref 2–3)

## 2019-05-24 ENCOUNTER — RESULTS ENCOUNTER (OUTPATIENT)
Dept: CARDIOLOGY | Facility: CLINIC | Age: 54
End: 2019-05-24

## 2019-05-24 DIAGNOSIS — I82.409 ACUTE DEEP VEIN THROMBOSIS (DVT) OF LOWER EXTREMITY, UNSPECIFIED LATERALITY, UNSPECIFIED VEIN (HCC): ICD-10-CM

## 2019-06-07 ENCOUNTER — RESULTS ENCOUNTER (OUTPATIENT)
Dept: CARDIOLOGY | Facility: CLINIC | Age: 54
End: 2019-06-07

## 2019-06-07 DIAGNOSIS — I82.409 ACUTE DEEP VEIN THROMBOSIS (DVT) OF LOWER EXTREMITY, UNSPECIFIED LATERALITY, UNSPECIFIED VEIN (HCC): ICD-10-CM

## 2019-06-21 ENCOUNTER — RESULTS ENCOUNTER (OUTPATIENT)
Dept: CARDIOLOGY | Facility: CLINIC | Age: 54
End: 2019-06-21

## 2019-06-21 DIAGNOSIS — I82.409 ACUTE DEEP VEIN THROMBOSIS (DVT) OF LOWER EXTREMITY, UNSPECIFIED LATERALITY, UNSPECIFIED VEIN (HCC): ICD-10-CM

## 2019-07-05 ENCOUNTER — RESULTS ENCOUNTER (OUTPATIENT)
Dept: CARDIOLOGY | Facility: CLINIC | Age: 54
End: 2019-07-05

## 2019-07-05 DIAGNOSIS — I82.409 ACUTE DEEP VEIN THROMBOSIS (DVT) OF LOWER EXTREMITY, UNSPECIFIED LATERALITY, UNSPECIFIED VEIN (HCC): ICD-10-CM

## 2019-07-19 ENCOUNTER — RESULTS ENCOUNTER (OUTPATIENT)
Dept: CARDIOLOGY | Facility: CLINIC | Age: 54
End: 2019-07-19

## 2019-07-19 DIAGNOSIS — I82.409 ACUTE DEEP VEIN THROMBOSIS (DVT) OF LOWER EXTREMITY, UNSPECIFIED LATERALITY, UNSPECIFIED VEIN (HCC): ICD-10-CM

## 2019-07-22 LAB — INR PPP: 1.32 (ref 2–3)

## 2019-07-23 ENCOUNTER — TELEPHONE (OUTPATIENT)
Dept: CARDIOLOGY | Facility: CLINIC | Age: 54
End: 2019-07-23

## 2019-07-23 ENCOUNTER — ANTICOAGULATION VISIT (OUTPATIENT)
Dept: CARDIOLOGY | Facility: CLINIC | Age: 54
End: 2019-07-23

## 2019-07-23 DIAGNOSIS — I82.409 ACUTE DEEP VEIN THROMBOSIS (DVT) OF LOWER EXTREMITY, UNSPECIFIED LATERALITY, UNSPECIFIED VEIN (HCC): ICD-10-CM

## 2019-07-23 DIAGNOSIS — I82.90 DEEP VEIN THROMBOSIS (DVT) OF NON-EXTREMITY VEIN, UNSPECIFIED CHRONICITY: Primary | ICD-10-CM

## 2019-07-23 NOTE — PROGRESS NOTES
PATIENT DENIES MISSING ANY DOSE, ABX'S, NEW MEDS ETC. NO DOSAGE CHANGE AND RECHECK LEVEL IN 1 MO. PER TIERA GARCIA, PAC. VERBAL ORDERS READ BACK AND VERIFIED BY TIERA GARCIA, PAC. PATIENT AWARE VERBALIZED OK. CATIE,LPN

## 2019-07-23 NOTE — TELEPHONE ENCOUNTER
EXPIRING PT/INR STANDING ORDER FROM [facility] FAXED TO OUR OFFICE, REQUIRING NEW STANDING ORDER. OK PER TIERA GARCIA, PAC TO SEND NEW STANDING ORDER. ORDER FAXED. PH,LPN

## 2019-08-02 ENCOUNTER — RESULTS ENCOUNTER (OUTPATIENT)
Dept: CARDIOLOGY | Facility: CLINIC | Age: 54
End: 2019-08-02

## 2019-08-02 DIAGNOSIS — I82.409 ACUTE DEEP VEIN THROMBOSIS (DVT) OF LOWER EXTREMITY, UNSPECIFIED LATERALITY, UNSPECIFIED VEIN (HCC): ICD-10-CM

## 2019-08-16 ENCOUNTER — RESULTS ENCOUNTER (OUTPATIENT)
Dept: CARDIOLOGY | Facility: CLINIC | Age: 54
End: 2019-08-16

## 2019-08-16 DIAGNOSIS — I82.409 ACUTE DEEP VEIN THROMBOSIS (DVT) OF LOWER EXTREMITY, UNSPECIFIED LATERALITY, UNSPECIFIED VEIN (HCC): ICD-10-CM

## 2019-08-21 ENCOUNTER — DOCUMENTATION (OUTPATIENT)
Dept: CARDIOLOGY | Facility: CLINIC | Age: 54
End: 2019-08-21

## 2019-08-30 ENCOUNTER — RESULTS ENCOUNTER (OUTPATIENT)
Dept: CARDIOLOGY | Facility: CLINIC | Age: 54
End: 2019-08-30

## 2019-08-30 DIAGNOSIS — I82.409 ACUTE DEEP VEIN THROMBOSIS (DVT) OF LOWER EXTREMITY, UNSPECIFIED LATERALITY, UNSPECIFIED VEIN (HCC): ICD-10-CM

## 2019-09-13 ENCOUNTER — RESULTS ENCOUNTER (OUTPATIENT)
Dept: CARDIOLOGY | Facility: CLINIC | Age: 54
End: 2019-09-13

## 2019-09-13 DIAGNOSIS — I82.409 ACUTE DEEP VEIN THROMBOSIS (DVT) OF LOWER EXTREMITY, UNSPECIFIED LATERALITY, UNSPECIFIED VEIN (HCC): ICD-10-CM

## 2019-09-25 ENCOUNTER — DOCUMENTATION (OUTPATIENT)
Dept: CARDIOLOGY | Facility: CLINIC | Age: 54
End: 2019-09-25

## 2019-09-25 NOTE — PROGRESS NOTES
CALLED TO REMIND PATIENT INR PAST DUE AND SHE STATES BRENDEN WANGHANNAH CALLED THE OFFICE SEVERAL TIMES TO GET A NEW STANDING ORDER FAXED TO [facility] BUT CAN'T GET IT DRAWN THEY NEVER GET ORDER. TOLD HER SEVERAL STANDING ORDERS HAD BEEN DONE AND SHOULD HAVE BEEN SENT. TOLD HER I WOULD FAX IT MYSELF TO [facility] AND SHE SAID SHE WOULD GO IN THE MORNING. PH,LPN

## 2019-09-26 ENCOUNTER — ANTICOAGULATION VISIT (OUTPATIENT)
Dept: CARDIOLOGY | Facility: CLINIC | Age: 54
End: 2019-09-26

## 2019-09-26 LAB — INR PPP: 2.25 (ref 2–3)

## 2019-09-27 ENCOUNTER — RESULTS ENCOUNTER (OUTPATIENT)
Dept: CARDIOLOGY | Facility: CLINIC | Age: 54
End: 2019-09-27

## 2019-09-27 DIAGNOSIS — I82.409 ACUTE DEEP VEIN THROMBOSIS (DVT) OF LOWER EXTREMITY, UNSPECIFIED LATERALITY, UNSPECIFIED VEIN (HCC): ICD-10-CM

## 2019-10-11 ENCOUNTER — RESULTS ENCOUNTER (OUTPATIENT)
Dept: CARDIOLOGY | Facility: CLINIC | Age: 54
End: 2019-10-11

## 2019-10-11 DIAGNOSIS — I82.409 ACUTE DEEP VEIN THROMBOSIS (DVT) OF LOWER EXTREMITY, UNSPECIFIED LATERALITY, UNSPECIFIED VEIN (HCC): ICD-10-CM

## 2019-10-25 ENCOUNTER — RESULTS ENCOUNTER (OUTPATIENT)
Dept: CARDIOLOGY | Facility: CLINIC | Age: 54
End: 2019-10-25

## 2019-10-25 DIAGNOSIS — I82.409 ACUTE DEEP VEIN THROMBOSIS (DVT) OF LOWER EXTREMITY, UNSPECIFIED LATERALITY, UNSPECIFIED VEIN (HCC): ICD-10-CM

## 2019-10-29 ENCOUNTER — ANTICOAGULATION VISIT (OUTPATIENT)
Dept: CARDIOLOGY | Facility: CLINIC | Age: 54
End: 2019-10-29

## 2019-10-29 LAB — INR PPP: 1.99 (ref 2–3)

## 2019-11-08 ENCOUNTER — RESULTS ENCOUNTER (OUTPATIENT)
Dept: CARDIOLOGY | Facility: CLINIC | Age: 54
End: 2019-11-08

## 2019-11-08 DIAGNOSIS — I82.409 ACUTE DEEP VEIN THROMBOSIS (DVT) OF LOWER EXTREMITY, UNSPECIFIED LATERALITY, UNSPECIFIED VEIN (HCC): ICD-10-CM

## 2019-11-22 ENCOUNTER — RESULTS ENCOUNTER (OUTPATIENT)
Dept: CARDIOLOGY | Facility: CLINIC | Age: 54
End: 2019-11-22

## 2019-11-22 DIAGNOSIS — I82.409 ACUTE DEEP VEIN THROMBOSIS (DVT) OF LOWER EXTREMITY, UNSPECIFIED LATERALITY, UNSPECIFIED VEIN (HCC): ICD-10-CM

## 2019-12-04 LAB — INR PPP: 3.04

## 2019-12-05 ENCOUNTER — ANTICOAGULATION VISIT (OUTPATIENT)
Dept: CARDIOLOGY | Facility: CLINIC | Age: 54
End: 2019-12-05

## 2019-12-05 DIAGNOSIS — I82.90 DEEP VEIN THROMBOSIS (DVT) OF NON-EXTREMITY VEIN, UNSPECIFIED CHRONICITY: Primary | ICD-10-CM

## 2019-12-05 NOTE — PROGRESS NOTES
Current PT/INR results received and reviewed by Ricardo Rhodes PA-C. Verbal orders given for no changes at this time, re-check in 3-4 weeks. Patient was called and notified, also was scheduled for appt in office as not been seen since 2017. Patient verbalized understanding. -JUNIOR;NILES

## 2019-12-06 ENCOUNTER — RESULTS ENCOUNTER (OUTPATIENT)
Dept: CARDIOLOGY | Facility: CLINIC | Age: 54
End: 2019-12-06

## 2019-12-06 DIAGNOSIS — I82.409 ACUTE DEEP VEIN THROMBOSIS (DVT) OF LOWER EXTREMITY, UNSPECIFIED LATERALITY, UNSPECIFIED VEIN (HCC): ICD-10-CM

## 2019-12-10 ENCOUNTER — OFFICE VISIT (OUTPATIENT)
Dept: CARDIOLOGY | Facility: CLINIC | Age: 54
End: 2019-12-10

## 2019-12-10 VITALS
HEIGHT: 66 IN | WEIGHT: 202 LBS | HEART RATE: 86 BPM | SYSTOLIC BLOOD PRESSURE: 137 MMHG | DIASTOLIC BLOOD PRESSURE: 73 MMHG | BODY MASS INDEX: 32.47 KG/M2 | OXYGEN SATURATION: 98 %

## 2019-12-10 DIAGNOSIS — M79.89 RIGHT LEG SWELLING: Primary | ICD-10-CM

## 2019-12-10 DIAGNOSIS — I25.10 CORONARY ARTERY DISEASE INVOLVING NATIVE CORONARY ARTERY OF NATIVE HEART WITHOUT ANGINA PECTORIS: ICD-10-CM

## 2019-12-10 DIAGNOSIS — M79.605 PAIN IN BOTH LOWER EXTREMITIES: ICD-10-CM

## 2019-12-10 DIAGNOSIS — I73.9 PVD (PERIPHERAL VASCULAR DISEASE) (HCC): ICD-10-CM

## 2019-12-10 DIAGNOSIS — M79.604 PAIN IN BOTH LOWER EXTREMITIES: ICD-10-CM

## 2019-12-10 DIAGNOSIS — I73.9 PVD (PERIPHERAL VASCULAR DISEASE) WITH CLAUDICATION (HCC): ICD-10-CM

## 2019-12-10 DIAGNOSIS — R93.6 ABNORMAL ULTRASOUND OF LOWER EXTREMITY: ICD-10-CM

## 2019-12-10 DIAGNOSIS — I70.513: ICD-10-CM

## 2019-12-10 DIAGNOSIS — I77.1 ARTERIAL INSUFFICIENCY (HCC): ICD-10-CM

## 2019-12-10 PROCEDURE — 93000 ELECTROCARDIOGRAM COMPLETE: CPT | Performed by: NURSE PRACTITIONER

## 2019-12-10 PROCEDURE — 99214 OFFICE O/P EST MOD 30 MIN: CPT | Performed by: NURSE PRACTITIONER

## 2019-12-10 RX ORDER — LOSARTAN POTASSIUM 100 MG/1
100 TABLET ORAL DAILY
COMMUNITY

## 2019-12-10 NOTE — PATIENT INSTRUCTIONS
Steps to Quit Smoking    Smoking tobacco can be bad for your health. It can also affect almost every organ in your body. Smoking puts you and people around you at risk for many serious long-lasting (chronic) diseases. Quitting smoking is hard, but it is one of the best things that you can do for your health. It is never too late to quit.  What are the benefits of quitting smoking?  When you quit smoking, you lower your risk for getting serious diseases and conditions. They can include:  · Lung cancer or lung disease.  · Heart disease.  · Stroke.  · Heart attack.  · Not being able to have children (infertility).  · Weak bones (osteoporosis) and broken bones (fractures).  If you have coughing, wheezing, and shortness of breath, those symptoms may get better when you quit. You may also get sick less often. If you are pregnant, quitting smoking can help to lower your chances of having a baby of low birth weight.  What can I do to help me quit smoking?  Talk with your doctor about what can help you quit smoking. Some things you can do (strategies) include:  · Quitting smoking totally, instead of slowly cutting back how much you smoke over a period of time.  · Going to in-person counseling. You are more likely to quit if you go to many counseling sessions.  · Using resources and support systems, such as:  ? Online chats with a counselor.  ? Phone quitlines.  ? Printed self-help materials.  ? Support groups or group counseling.  ? Text messaging programs.  ? Mobile phone apps or applications.  · Taking medicines. Some of these medicines may have nicotine in them. If you are pregnant or breastfeeding, do not take any medicines to quit smoking unless your doctor says it is okay. Talk with your doctor about counseling or other things that can help you.  Talk with your doctor about using more than one strategy at the same time, such as taking medicines while you are also going to in-person counseling. This can help make  quitting easier.  What things can I do to make it easier to quit?  Quitting smoking might feel very hard at first, but there is a lot that you can do to make it easier. Take these steps:  · Talk to your family and friends. Ask them to support and encourage you.  · Call phone quitlines, reach out to support groups, or work with a counselor.  · Ask people who smoke to not smoke around you.  · Avoid places that make you want (trigger) to smoke, such as:  ? Bars.  ? Parties.  ? Smoke-break areas at work.  · Spend time with people who do not smoke.  · Lower the stress in your life. Stress can make you want to smoke. Try these things to help your stress:  ? Getting regular exercise.  ? Deep-breathing exercises.  ? Yoga.  ? Meditating.  ? Doing a body scan. To do this, close your eyes, focus on one area of your body at a time from head to toe, and notice which parts of your body are tense. Try to relax the muscles in those areas.  · Download or buy apps on your mobile phone or tablet that can help you stick to your quit plan. There are many free apps, such as QuitGuide from the CDC (Centers for Disease Control and Prevention). You can find more support from smokefree.gov and other websites.  This information is not intended to replace advice given to you by your health care provider. Make sure you discuss any questions you have with your health care provider.  Document Released: 10/14/2010 Document Revised: 08/15/2017 Document Reviewed: 05/03/2016  [employer] Interactive Patient Education © 2019 [employer]  Fat and Cholesterol Restricted Eating Plan  Getting too much fat and cholesterol in your diet may cause health problems. Choosing the right foods helps keep your fat and cholesterol at normal levels. This can keep you from getting certain diseases.  Your doctor may recommend an eating plan that includes:  · Total fat: ______% or less of total calories a day.  · Saturated fat: ______% or less of total calories a  "day.  · Cholesterol: less than _________mg a day.  · Fiber: ______g a day.  What are tips for following this plan?  Meal planning  · At meals, divide your plate into four equal parts:  ? Fill one-half of your plate with vegetables and green salads.  ? Fill one-fourth of your plate with whole grains.  ? Fill one-fourth of your plate with low-fat (lean) protein foods.  · Eat fish that is high in omega-3 fats at least two times a week. This includes mackerel, tuna, sardines, and salmon.  · Eat foods that are high in fiber, such as whole grains, beans, apples, broccoli, carrots, peas, and barley.  General tips    · Work with your doctor to lose weight if you need to.  · Avoid:  ? Foods with added sugar.  ? Fried foods.  ? Foods with partially hydrogenated oils.  · Limit alcohol intake to no more than 1 drink a day for nonpregnant women and 2 drinks a day for men. One drink equals 12 oz of beer, 5 oz of wine, or 1½ oz of hard liquor.  Reading food labels  · Check food labels for:  ? Trans fats.  ? Partially hydrogenated oils.  ? Saturated fat (g) in each serving.  ? Cholesterol (mg) in each serving.  ? Fiber (g) in each serving.  · Choose foods with healthy fats, such as:  ? Monounsaturated fats.  ? Polyunsaturated fats.  ? Omega-3 fats.  · Choose grain products that have whole grains. Look for the word \"whole\" as the first word in the ingredient list.  Cooking  · Cook foods using low-fat methods. These include baking, boiling, grilling, and broiling.  · Eat more home-cooked foods. Eat at restaurants and buffets less often.  · Avoid cooking using saturated fats, such as butter, cream, palm oil, palm kernel oil, and coconut oil.  Recommended foods    Fruits  · All fresh, canned (in natural juice), or frozen fruits.  Vegetables  · Fresh or frozen vegetables (raw, steamed, roasted, or grilled). Green salads.  Grains  · Whole grains, such as whole wheat or whole grain breads, crackers, cereals, and pasta. Unsweetened " oatmeal, bulgur, barley, quinoa, or brown rice. Corn or whole wheat flour tortillas.  Meats and other protein foods  · Ground beef (85% or leaner), grass-fed beef, or beef trimmed of fat. Skinless chicken or turkey. Ground chicken or turkey. Pork trimmed of fat. All fish and seafood. Egg whites. Dried beans, peas, or lentils. Unsalted nuts or seeds. Unsalted canned beans. Nut butters without added sugar or oil.  Dairy  · Low-fat or nonfat dairy products, such as skim or 1% milk, 2% or reduced-fat cheeses, low-fat and fat-free ricotta or cottage cheese, or plain low-fat and nonfat yogurt.  Fats and oils  · Tub margarine without trans fats. Light or reduced-fat mayonnaise and salad dressings. Avocado. Olive, canola, sesame, or safflower oils.  The items listed above may not be a complete list of foods and beverages you can eat. Contact a dietitian for more information.  Foods to avoid  Fruits  · Canned fruit in heavy syrup. Fruit in cream or butter sauce. Fried fruit.  Vegetables  · Vegetables cooked in cheese, cream, or butter sauce. Fried vegetables.  Grains  · White bread. White pasta. White rice. Cornbread. Bagels, pastries, and croissants. Crackers and snack foods that contain trans fat and hydrogenated oils.  Meats and other protein foods  · Fatty cuts of meat. Ribs, chicken wings, byrnes, sausage, bologna, salami, chitterlings, fatback, hot dogs, bratwurst, and packaged lunch meats. Liver and organ meats. Whole eggs and egg yolks. Chicken and turkey with skin. Fried meat.  Dairy  · Whole or 2% milk, cream, half-and-half, and cream cheese. Whole milk cheeses. Whole-fat or sweetened yogurt. Full-fat cheeses. Nondairy creamers and whipped toppings. Processed cheese, cheese spreads, and cheese curds.  Beverages  · Alcohol. Sugar-sweetened drinks such as sodas, lemonade, and fruit drinks.  Fats and oils  · Butter, stick margarine, lard, shortening, ghee, or byrnes fat. Coconut, palm kernel, and palm oils.  Sweets and  desserts  · Corn syrup, sugars, honey, and molasses. Candy. Jam and jelly. Syrup. Sweetened cereals. Cookies, pies, cakes, donuts, muffins, and ice cream.  The items listed above may not be a complete list of foods and beverages you should avoid. Contact a dietitian for more information.  Summary  · Choosing the right foods helps keep your fat and cholesterol at normal levels. This can keep you from getting certain diseases.  · At meals, fill one-half of your plate with vegetables and green salads.  · Eat high-fiber foods, like whole grains, beans, apples, carrots, peas, and barley.  · Limit added sugar, saturated fats, alcohol, and fried foods.  This information is not intended to replace advice given to you by your health care provider. Make sure you discuss any questions you have with your health care provider.  Document Released: 06/18/2013 Document Revised: 08/21/2019 Document Reviewed: 09/04/2018  [employer] Interactive Patient Education © 2019 [employer]  BMI for Adults    Body mass index (BMI) is a number that is calculated from a person's weight and height. BMI may help to estimate how much of a person's weight is composed of fat. BMI can help identify those who may be at higher risk for certain medical problems.  How is BMI used with adults?  BMI is used as a screening tool to identify possible weight problems. It is used to check whether a person is obese, overweight, healthy weight, or underweight.  How is BMI calculated?  BMI measures your weight and compares it to your height. This can be done either in English ([place] or metric measurements. Note that charts are available to help you find your BMI quickly and easily without having to do these calculations yourself.  To calculate your BMI in English ([place] measurements, your health care provider will:  1. Measure your weight in pounds (lb).  2. Multiply the number of pounds by 703.  ? For example, for a person who weighs 180 lb, multiply that number by  "703, which equals 126,540.  3. Measure your height in inches (in). Then multiply that number by itself to get a measurement called \"inches squared.\"  ? For example, for a person who is 70 in tall, the \"inches squared\" measurement is 70 in x 70 in, which equals 4900 inches squared.  4. Divide the total from Step 2 (number of lb x 703) by the total from Step 3 (inches squared): 126,540 ÷ 4900 = 25.8. This is your BMI.  To calculate your BMI in metric measurements, your health care provider will:  1. Measure your weight in kilograms (kg).  2. Measure your height in meters (m). Then multiply that number by itself to get a measurement called \"meters squared.\"  ? For example, for a person who is 1.75 m tall, the \"meters squared\" measurement is 1.75 m x 1.75 m, which is equal to 3.1 meters squared.  3. Divide the number of kilograms (your weight) by the meters squared number. In this example: 70 ÷ 3.1 = 22.6. This is your BMI.  How is BMI interpreted?  To interpret your results, your health care provider will use BMI charts to identify whether you are underweight, normal weight, overweight, or obese. The following guidelines will be used:  · Underweight: BMI less than 18.5.  · Normal weight: BMI between 18.5 and 24.9.  · Overweight: BMI between 25 and 29.9.  · Obese: BMI of 30 and above.  Please note:  · Weight includes both fat and muscle, so someone with a muscular build, such as an athlete, may have a BMI that is higher than 24.9. In cases like these, BMI is not an accurate measure of body fat.  · To determine if excess body fat is the cause of a BMI of 25 or higher, further assessments may need to be done by a health care provider.  · BMI is usually interpreted in the same way for men and women.  Why is BMI a useful tool?  BMI is useful in two ways:  · Identifying a weight problem that may be related to a medical condition, or that may increase the risk for medical problems.  · Promoting lifestyle and diet changes in " order to reach a healthy weight.  Summary  · Body mass index (BMI) is a number that is calculated from a person's weight and height.  · BMI may help to estimate how much of a person's weight is composed of fat. BMI can help identify those who may be at higher risk for certain medical problems.  · BMI can be measured using English measurements or metric measurements.  · To interpret your results, your health care provider will use BMI charts to identify whether you are underweight, normal weight, overweight, or obese.  This information is not intended to replace advice given to you by your health care provider. Make sure you discuss any questions you have with your health care provider.  Document Released: 08/29/2005 Document Revised: 10/31/2018 Document Reviewed: 10/31/2018  [employer] Interactive Patient Education © 2019 [employer]    Warfarin Coagulopathy  Warfarin coagulopathy refers to bleeding that may occur as a complication of the medicine warfarin. Warfarin is a blood thinner (anticoagulant). Anticoagulants prevent dangerous blood clots. Bleeding is the most common and most serious complication of warfarin.  While taking warfarin, you will need to have blood tests (prothrombin tests, or PT tests) regularly to measure your blood clotting time. The PT test results will be reported as the International Normalized Ratio (INR). The INR tells your health care provider whether your dosage of warfarin needs to be changed. The longer it takes your blood to clot, the higher the INR. Your risk of warfarin coagulopathy increases as your INR increases.  What are the causes?  This condition may be caused by:  · Taking too much warfarin (overdose).  · Underlying medical conditions.  · Changes to your diet.  · Interactions with medicines, supplements, or alcohol.  What are the signs or symptoms?  Warfarin coagulopathy may cause bleeding from any tissue or organ. Symptoms may include:  · Bleeding from the gums.  · A  nosebleed that is not easily stopped.  · Blood in stool. This may look like bright red, dark, or black, tarry stools.  · Blood in urine. This may look like pink, red, or brown urine.  · Unusual bruising or bruising easily.  · A cut that does not stop bleeding within 10 minutes.  · Coughing up blood.  · Vomiting blood.  · Feeling nauseous for longer than 1 day.  · Broken blood vessels in the eye (subconjunctival hemorrhage). This may look like a bright red or dark red patch on the white part of the eye.  · Abdominal or back pain with or without bruising.  · Sudden, severe headache.  · Sudden weakness or numbness of the face, arm, or leg, especially on one side of the body.  · Sudden confusion.  · Difficulty speaking (aphasia) or understanding speech.  · Sudden trouble seeing out of one or both eyes.  · Unexpected difficulty walking.  · Dizziness.  · Loss of balance or coordination.  · Unusual vaginal bleeding.  · Swelling or pain at an injection site.  · Skin scarring due to tissue death (necrosis) of fatty tissue. This may cause pain in the waist, thighs, or buttocks. This is more common among women.  How is this diagnosed?  This condition is diagnosed after your health care provider places you on warfarin and then finds out how it affects your blood's ability to clot. Prothrombin time (PT) clotting tests are used to monitor your clotting factor. These tests also help your health care provider to find the warfarin dose that is best for you.  How is this treated?  This condition is treated with vitamin K. Vitamin K helps the blood to clot. You may receive vitamin K every 12 hours, or as needed. You may also receive donated plasma (transfusions of fresh frozen plasma). Plasma is the liquid part of blood, and contains substances that help the blood clot.  Follow these instructions at home:  Medicines  · Take warfarin exactly as told by your health care provider. This ensures that you avoid bleeding or clots that could  result in serious injury, pain, or disability.  · Take your medicine at the same time every day. If you forget to take your dose of warfarin, take it as soon as you remember on that day. If you do not remember to take it on that day, do not take an extra dose the next day.  · Contact your health care provider if you miss a dose or take an extra dose. Do not change your dosage on your own to make up for missed or extra doses.  · Talk with your health care provider or your pharmacist before starting or stopping any new medicines. Many prescription and over-the-counter medicines can interfere with warfarin. This includes over-the-counter vitamins, dietary supplements, herbal medicines, and pain medicines. Your warfarin dosage may need to be adjusted.  Eating and drinking  · It is important to maintain a normal, balanced diet while taking warfarin. Avoid major changes in your diet. If you are planning to change your diet, talk with your health care provider before making changes.  · Your health care provider may recommend that you work with a diet and nutrition specialist (dietitian).  · Vitamin K makes warfarin less effective. It is found in many foods. Eat a consistent amount of foods that contain vitamin K. For example, you may decide to eat 2 vitamin K-containing foods each day. Your warfarin dose is set according to the amount of vitamin K in your blood.  · Eat a consistent amount of foods that contain vitamin K. Vitamin K makes warfarin less effective, so eating the same amount each day enables your health care provider to set the correct dose of warfarin. You may decide to eat 2 vitamin K-containing foods each day.  Tests    · Make sure to have PT tests at least once every 4-6 weeks for the entire time you are taking warfarin.  · Ask your health care provider what your target INR range is. Make sure you always know your target range. If your INR is not in your target range, your health care provider may adjust  your dosage.  Preventing bleeding and injury  · Some common over-the-counter medicines and supplements may increase the risk of bleeding while taking warfarin. They include:  ? Acetaminophen.  ? Aspirin.  ? NSAIDs, such as ibuprofen or naproxen.  ? Vitamin E.  · Avoid situations that cause bleeding. You may bleed more easily while taking warfarin. To limit bleeding, take the following actions:  ? Use a softer toothbrush.  ? Floss with waxed floss, not unwaxed floss.  ? Shave with an electric razor, not with a blade.  ? Limit your use of sharp objects.  ? Avoid potentially harmful activities, such as contact sports.  General instructions  · Wear or carry identification that says that you are taking warfarin.  · Make sure that all health care providers, including your dentist, know that you are taking warfarin.  · If you need surgery, tell your health care provider that you are taking warfarin. You may have to stop taking warfarin before your surgery.  · If you plan to breastfeed or become pregnant while taking warfarin, talk with your health care provider.  · Avoid alcohol, tobacco, and drugs.  ? If your health care provider approves, limit alcohol intake to no more than 1 drink a day for non-pregnant women and 2 drinks a day for men. One drink equals 12 oz. of beer, 5 oz. of wine, or 1½ oz. of hard liquor.  ? If you change the amount of nicotine, tobacco, or alcohol you use, tell your health care provider.  · Keep all follow-up visits and lab visits as told by your health care provider. This is very important because warfarin is a medicine that needs to be closely monitored.  Contact a health care provider if:  · You miss a dose.  · You take an extra dose.  · You plan to have any kind of surgery or procedure.  · You are unable to take your medicine due to nausea, vomiting, or diarrhea.  · You have any major changes in your diet, or you plan to make major changes in your diet.  · You start or stop any  over-the-counter medicine, prescription medicine, or dietary supplement.  · You become pregnant, plan to become pregnant, or think you may be pregnant.  · You have menstrual periods that are heavier than usual, or unusual vaginal bleeding.  · You have unusual bruising.  · You lose your appetite.  · You have a fever.  · You have diarrhea that lasts for more than 24 hours.  Get help right away if:  · You develop symptoms of an allergic reaction, such as:  ? Swelling of the lips, face, tongue, mouth, or throat.  ? Rash.  ? Itching.  ? Itchy, red, swollen areas of skin (hives).  ? Trouble breathing.  ? Chest tightness.  · You have any symptoms of stroke. BEFAST is an easy way to remember the main warning signs of stroke:  ? B - Balance. Signs are dizziness, sudden trouble walking, or loss of balance.  ? E - Eye. Signs are trouble seeing or a sudden change in vision.  ? F - Face. Signs are sudden weakness or numbness of the face, or the face or eyelid drooping on one side.  ? A - Arm. Signs are weakness or numbness in an arm. This happens suddenly and usually on one side of the body.  ? S - Speech. Signs are trouble speaking, slurred speech, or trouble understanding speech.  ? T - Time. Time to call emergency services. Write down the time your symptoms started.  · You have other signs of stroke, such as:  ? A sudden, severe headache with no known cause.  ? Nausea or vomiting.  ? Seizure.  · You have signs or symptoms of a blood clot, such as:  ? Pain or swelling in your leg or arm.  ? Skin that is red or warm to the touch on your arm or leg.  ? Shortness of breath or difficulty breathing.  ? Chest pain.  ? Unexplained fever.  · You have:  ? A fall or have an accident, especially if you hit your head.  ? Blood in your urine. Your urine may look reddish, pinkish, or tea-colored.  ? Blood in your stool. Your stool may be black or bright red.  ? Bleeding that does not stop after applying pressure to the area for 30  minutes.  ? Severe pain in your joints or back.  ? Purple or blue toes.  ? Skin ulcers that do not go away.  · You vomit blood or cough up blood. The blood may be bright red, or it may look like coffee grounds.  These symptoms may represent a serious problem that is an emergency. Do not wait to see if the symptoms will go away. Get medical help right away. Call your local emergency services (911 in the [place] Do not drive yourself to the hospital.  Summary  · Warfarin needs to be closely monitored with blood tests. It is very important to keep all lab visits and follow-up visits with your health care provider. Make sure you know your target INR range and your warfarin dosage.  · Monitor how much vitamin K you eat every day. Try to eat the same amount every day.  · Wear or carry identification that says that you are taking warfarin.  · Take warfarin at the same time every day. Call your health care provider if you miss a dose or if you take an extra dose. Do not change the dosage of warfarin on your own.  · Know the signs and symptoms of blood clots, bleeding, and stroke. Know when to get emergency medical help.  This information is not intended to replace advice given to you by your health care provider. Make sure you discuss any questions you have with your health care provider.  Document Released: 11/26/2007 Document Revised: 03/07/2018 Document Reviewed: 03/07/2018  [employer] Interactive Patient Education © 2019 [employer]

## 2019-12-10 NOTE — PROGRESS NOTES
Subjective     Vannessa Contreras is a 54 y.o. female who presents to day for Coronary Artery Disease.    CHIEF COMPLIANT  Chief Complaint   Patient presents with   • Coronary Artery Disease     1. CAD   1.1. Nonobstructive Cath 2007  2. Hyperlipidemia  3. Hypertension  4. Palpitations  5. PVD   5.1 Bilateral aortobifemoral bypass 6/2007; thrombectomy rt aortofemoral graft 11/2009  6.Tobacco Habituation, uses e-cigarettes also  7. HX of DVT on coumadin  Active Problems:  Problem List Items Addressed This Visit        Cardiovascular and Mediastinum    PVD (peripheral vascular disease) (CMS/[facility])    Overview     5.1 Bilateral aortobifemoral bypass 6/2007; thrombectomy rt aortofemoral graft 11/2009           Relevant Orders    Duplex Lower Extremity Art / Grafts - Bilateral CAR    Duplex Venous Lower Extremity - Bilateral CAR    Arterial insufficiency (CMS/[facility])      Other Visit Diagnoses     Right leg swelling    -  Primary    Relevant Orders    Duplex Lower Extremity Art / Grafts - Bilateral CAR    Duplex Venous Lower Extremity - Bilateral CAR          HPI  HPI  Ms. Contreras is a 54-year-old female patient who is being seen today for further evaluation of coronary artery disease, bilateral aortobifemoral bypass, and DVT while on Coumadin.  Patient states that she is doing well from the tach standpoint and reports no angina or anginal equivalent symptoms.  However patient does report that she has been having a lot of pain in her right groin where it feels like it is bulging out and by [place].  Feels like the hand is going around her and holding it in which is a similar sensation that she had when she had a previous thrombus in her graft but not quite as severe as that.  States also that her right lower extremity is swelling quite a bit more and is significantly larger than her left side.  States that she has a heat-like sensation and is if something suppression up against her legs that is intermittent in nature that  comes and goes.  States that this is been going on for a while.  Informed patient that INR was greater than 3 but did not recommend any changes at this time would reevaluate INR in 2 to 4 weeks.  Denied any chest pain, shortness of air, PND, orthopnea, palpitations, decrease in exercise tolerance, low syncope, or neurological changes.      PRIOR MEDS  Current Outpatient Medications on File Prior to Visit   Medication Sig Dispense Refill   • aspirin (ASPIRIN LOW DOSE) 81 MG EC tablet Take 81 mg by mouth 2 (Two) Times a Day.     • atorvastatin (LIPITOR) 40 MG tablet TAKE 1 TABLET BY MOUTH EVERY NIGHT AT BEDTIME 30 tablet 0   • furosemide (LASIX) 20 MG tablet Take 20 mg by mouth Daily.  11   • losartan (COZAAR) 100 MG tablet Take 100 mg by mouth Daily.     • meloxicam (MOBIC) 15 MG tablet Take 15 mg by mouth Daily.     • metoprolol tartrate (LOPRESSOR) 50 MG tablet Take 50 mg by mouth 2 (Two) Times a Day.  12   • Multiple Vitamins-Minerals (MULTIVITAMIN ADULT PO) Take  by mouth Daily.     • nitroglycerin (NITROSTAT) 0.4 MG SL tablet Place 0.4 mg under the tongue Every 5 (Five) Minutes As Needed.     • Omega-3 Fatty Acids (EQL FISH OIL) 1200 MG capsule Take 1 tablet by mouth 2 (Two) Times a Day.     • warfarin (COUMADIN) 4 MG tablet Take 4 mg by mouth. Takes 6 mg M/W/Sat and 4 mg all other days     • warfarin (COUMADIN) 6 MG tablet TAKE 1 TABLET BY MOUTH EVERY WEDNESDAY (Patient taking differently: Take 6 mg by mouth. M, W and Sat) 30 tablet 5   • [DISCONTINUED] gabapentin (NEURONTIN) 600 MG tablet Take 600 mg by mouth 2 (Two) Times a Day.       No current facility-administered medications on file prior to visit.        ALLERGIES  Patient has no known allergies.    HISTORY  Past Medical History:   Diagnosis Date   • Arterial insufficiency (CMS/HCC)    • Atherosclerosis of arteries of extremities (CMS/HCC)    • CAD (coronary artery disease)    • DVT (deep venous thrombosis) (CMS/HCC)     h.o - on coumadin    • Fatigue     • Hyperlipidemia    • Hypertension    • Palpitations    • PVD (peripheral vascular disease) (CMS/HCC)    • SOB (shortness of breath)        Social History     Socioeconomic History   • Marital status:      Spouse name: Not on file   • Number of children: Not on file   • Years of education: Not on file   • Highest education level: Not on file   Tobacco Use   • Smoking status: Current Every Day Smoker     Packs/day: 1.00   • Tobacco comment: also states former smoker 03/03/16   Substance and Sexual Activity   • Alcohol use: Yes     Comment: Social        Family History   Problem Relation Age of Onset   • Atrial fibrillation Mother    • Heart attack Father    • Diabetes Sister    • Hypertension Sister    • Stroke Maternal Grandmother    • Peripheral vascular disease Other        Review of Systems   Constitutional: Positive for fatigue (get tired easy for years). Negative for chills and fever.   HENT: Negative.  Negative for congestion.    Eyes: Negative.  Negative for visual disturbance.   Respiratory: Negative.  Negative for chest tightness and shortness of breath.    Cardiovascular: Positive for leg swelling. Negative for chest pain and palpitations.   Gastrointestinal: Positive for nausea (for the past month). Negative for abdominal pain, blood in stool, constipation, diarrhea and vomiting.   Endocrine: Negative.  Negative for cold intolerance and heat intolerance.   Genitourinary: Positive for urgency (takes lasix). Negative for dysuria, frequency and hematuria.   Musculoskeletal: Positive for arthralgias. Negative for back pain and neck pain.   Skin: Negative.  Negative for rash and wound.   Allergic/Immunologic: Negative.  Negative for environmental allergies and food allergies.   Neurological: Negative.  Negative for dizziness, syncope and light-headedness.   Hematological: Negative.  Does not bruise/bleed easily.   Psychiatric/Behavioral: Negative.  Negative for sleep disturbance (denies waking with  "soa or cp).       Objective     VITALS: /73 (BP Location: Left arm, Patient Position: Sitting)   Pulse 86   Ht 167.6 cm (66\")   Wt 91.6 kg (202 lb)   SpO2 98%   BMI 32.60 kg/m²     LABS:   Lab Results (most recent)     None          IMAGING:   No Images in the past 120 days found..    EXAM:  Physical Exam   Constitutional: She is oriented to person, place, and time. She appears well-developed and well-nourished.   HENT:   Head: Normocephalic and atraumatic.   Eyes: Pupils are equal, round, and reactive to light. EOM are normal.   Neck: Trachea normal and phonation normal. Neck supple. No JVD present. Carotid bruit is not present. No thyroid mass present.   Cardiovascular: Normal rate, regular rhythm, normal heart sounds and intact distal pulses. Exam reveals no gallop and no friction rub.   No murmur heard.  Pulses:       Radial pulses are 2+ on the right side, and 2+ on the left side.        Dorsalis pedis pulses are 2+ on the right side, and 2+ on the left side.        Posterior tibial pulses are 2+ on the right side, and 2+ on the left side.   Pulmonary/Chest: Effort normal and breath sounds normal. No respiratory distress. She has no wheezes. She has no rales.   Abdominal: Soft. Bowel sounds are normal. She exhibits no distension and no abdominal bruit. There is no tenderness.   Musculoskeletal: Normal range of motion. She exhibits edema (right worse thanleft, significt difference in size).   Neurological: She is alert and oriented to person, place, and time. She has normal strength. No cranial nerve deficit or sensory deficit.   Skin: Skin is warm, dry and intact. Capillary refill takes less than 2 seconds. No rash noted.   Psychiatric: She has a normal mood and affect. Her speech is normal and behavior is normal. Judgment and thought content normal. Cognition and memory are normal.   Nursing note and vitals reviewed.      Procedure     ECG 12 Lead  Date/Time: 12/10/2019 5:52 PM  Performed by: " Rush Cruz APRN  Authorized by: Rush Cruz APRN   Rhythm: sinus rhythm  Rate: normal  BPM: 75  QRS axis: normal    Clinical impression: normal ECG               Assessment/Plan    Diagnosis Plan   1. Right leg swelling  Duplex Lower Extremity Art / Grafts - Bilateral CAR    Duplex Venous Lower Extremity - Bilateral CAR   2. PVD (peripheral vascular disease) (CMS/HCC)  Duplex Lower Extremity Art / Grafts - Bilateral CAR    Duplex Venous Lower Extremity - Bilateral CAR   3. Arterial insufficiency (CMS/HCC)     1.  Patient is complaining of symptoms in her right groin such as a constant pressure in which she feels like someone squeezing it.  Patient also has a history of aortobifemoral bypass with thrombectomy of the right graft 2 years later.  Patient does have palpable pulses bilaterally.  However the right leg is significantly more swollen than the left and is obviously larger.  Due to this and history of DVT previously on Coumadin I feel it is necessary to evaluate both arterial and venous Dopplers to check for arterial insufficiency and rule out DVT of the right lower extremity.  Patient informed of the procedures and wishes to proceed.  2.  Patient's INR is right at 3 we will recheck in 2 to 4 weeks.  No reported signs of bleeding.  Tolerating Coumadin and has been on it for a long duration without difficulty.  3.  Patient advised signs symptoms ACS and advised to seek emergent treatment for any new worsening symptoms.  Patient also advised to seek sooner follow-up as needed.    Return in about 3 months (around 3/10/2020), or if symptoms worsen or fail to improve.    Vannessa was seen today for coronary artery disease.    Diagnoses and all orders for this visit:    Right leg swelling  -     Duplex Lower Extremity Art / Grafts - Bilateral CAR; Future  -     Duplex Venous Lower Extremity - Bilateral CAR; Future    PVD (peripheral vascular disease) (CMS/HCC)  -     Duplex Lower Extremity Art / Grafts -  Bilateral CAR; Future  -     Duplex Venous Lower Extremity - Bilateral CAR; Future    Arterial insufficiency (CMS/HCC)        Vannessa Contreras is a current cigarettes user.  She currently 1/2 of cigarettes per day for a duration of 35 years. I have educated her on the risk of diseases from using tobacco products such as cancer, COPD and heart diease.     I advised her to quit and she is willing to quit. We have discussed the following method/s for tobacco cessation:  Counseling.      I spent 3  minutes counseling the patient.        Patient's Body mass index is 32.6 kg/m². BMI is above normal parameters. Recommendations include: educational material.           MEDS ORDERED DURING VISIT:  No orders of the defined types were placed in this encounter.          This document has been electronically signed by Rush Cruz Jr., JAMAAL  December 10, 2019 5:49 PM

## 2019-12-18 ENCOUNTER — HOSPITAL ENCOUNTER (OUTPATIENT)
Dept: CARDIOLOGY | Facility: HOSPITAL | Age: 54
Discharge: HOME | End: 2019-12-18
Admitting: NURSE PRACTITIONER

## 2019-12-18 ENCOUNTER — HOSPITAL ENCOUNTER (OUTPATIENT)
Dept: CARDIOLOGY | Facility: HOSPITAL | Age: 54
Discharge: HOME | End: 2019-12-18

## 2019-12-18 ENCOUNTER — ANTICOAGULATION VISIT (OUTPATIENT)
Dept: CARDIOLOGY | Facility: CLINIC | Age: 54
End: 2019-12-18

## 2019-12-18 DIAGNOSIS — M79.89 RIGHT LEG SWELLING: ICD-10-CM

## 2019-12-18 DIAGNOSIS — I73.9 PVD (PERIPHERAL VASCULAR DISEASE) (HCC): ICD-10-CM

## 2019-12-18 LAB — INR PPP: 1.7 (ref 2–3)

## 2019-12-18 PROCEDURE — 93971 EXTREMITY STUDY: CPT

## 2019-12-18 PROCEDURE — 93925 LOWER EXTREMITY STUDY: CPT | Performed by: INTERNAL MEDICINE

## 2019-12-18 PROCEDURE — 93971 EXTREMITY STUDY: CPT | Performed by: INTERNAL MEDICINE

## 2019-12-18 PROCEDURE — 93925 LOWER EXTREMITY STUDY: CPT

## 2019-12-18 NOTE — PROGRESS NOTES
PER TIERA GARCIA, PAC TAKE AN EXTRA 3 MG TODAY THEN RESUME PREVIOUS DOSING AND RECHECK LEVEL IN 3 WEEKS. VERBAL ORDERS READ BACK AND VERIFIED BY RONALDO VARELA. PATIENT AWARE, VERBALIZED OK. PH,LPN

## 2019-12-20 ENCOUNTER — RESULTS ENCOUNTER (OUTPATIENT)
Dept: CARDIOLOGY | Facility: CLINIC | Age: 54
End: 2019-12-20

## 2019-12-20 DIAGNOSIS — I82.409 ACUTE DEEP VEIN THROMBOSIS (DVT) OF LOWER EXTREMITY, UNSPECIFIED LATERALITY, UNSPECIFIED VEIN (HCC): ICD-10-CM

## 2020-01-03 ENCOUNTER — RESULTS ENCOUNTER (OUTPATIENT)
Dept: CARDIOLOGY | Facility: CLINIC | Age: 55
End: 2020-01-03

## 2020-01-03 DIAGNOSIS — I82.409 ACUTE DEEP VEIN THROMBOSIS (DVT) OF LOWER EXTREMITY, UNSPECIFIED LATERALITY, UNSPECIFIED VEIN (HCC): ICD-10-CM

## 2020-01-05 LAB
BH CV ECHO MEAS - BSA(HAYCOCK): 2.1 M^2
BH CV ECHO MEAS - BSA(HAYCOCK): 2.1 M^2
BH CV ECHO MEAS - BSA: 2 M^2
BH CV ECHO MEAS - BSA: 2 M^2
BH CV ECHO MEAS - BZI_BMI: 32.6 KILOGRAMS/M^2
BH CV ECHO MEAS - BZI_BMI: 32.6 KILOGRAMS/M^2
BH CV ECHO MEAS - BZI_METRIC_HEIGHT: 167.6 CM
BH CV ECHO MEAS - BZI_METRIC_HEIGHT: 167.6 CM
BH CV ECHO MEAS - BZI_METRIC_WEIGHT: 91.6 KG
BH CV ECHO MEAS - BZI_METRIC_WEIGHT: 91.6 KG
BH CV LEA LEFT ANT TIBIAL A DISTAL EDV: 0 CM/S
BH CV LEA LEFT ANT TIBIAL A DISTAL PSV: 44 CM/S
BH CV LEA LEFT CFA PROX EDV: 6 CM/S
BH CV LEA LEFT CFA PROX PSV: 206 CM/S
BH CV LEA LEFT DFA PROX EDV: 3 CM/S
BH CV LEA LEFT DFA PROX PSV: 46 CM/S
BH CV LEA LEFT POPITEAL A  PROX EDV: 0 CM/S
BH CV LEA LEFT POPITEAL A  PROX PSV: 78 CM/S
BH CV LEA LEFT PTA DISTAL EDV: 0 CM/S
BH CV LEA LEFT PTA DISTAL PSV: 61 CM/S
BH CV LEA LEFT SFA DISTAL EDV: 0 CM/S
BH CV LEA LEFT SFA DISTAL PSV: 78 CM/S
BH CV LEA LEFT SFA MID EDV: 0 CM/S
BH CV LEA LEFT SFA MID PSV: 91 CM/S
BH CV LEA LEFT SFA PROX EDV: 0 CM/S
BH CV LEA LEFT SFA PROX PSV: 106 CM/S
BH CV LEA RIGHT ANT TIBIAL A DISTAL EDV: 0 CM/S
BH CV LEA RIGHT ANT TIBIAL A DISTAL PSV: 72 CM/S
BH CV LEA RIGHT CFA PROX EDV: 0 CM/S
BH CV LEA RIGHT CFA PROX PSV: 113 CM/S
BH CV LEA RIGHT DFA PROX EDV: 0 CM/S
BH CV LEA RIGHT DFA PROX PSV: 44 CM/S
BH CV LEA RIGHT POPITEAL A  PROX EDV: 18 CM/S
BH CV LEA RIGHT POPITEAL A  PROX PSV: 132 CM/S
BH CV LEA RIGHT PTA DISTAL EDV: 7 CM/S
BH CV LEA RIGHT PTA DISTAL PSV: 98 CM/S
BH CV LEA RIGHT SFA DISTAL EDV: 2 CM/S
BH CV LEA RIGHT SFA DISTAL PSV: 103 CM/S
BH CV LEA RIGHT SFA MID EDV: 4 CM/S
BH CV LEA RIGHT SFA MID PSV: 131 CM/S
BH CV LEA RIGHT SFA PROX EDV: 2 CM/S
BH CV LEA RIGHT SFA PROX PSV: 112 CM/S
BH CV LOWER VASCULAR RIGHT COMMON FEMORAL AUGMENT: NORMAL
BH CV LOWER VASCULAR RIGHT COMMON FEMORAL COMPETENT: NORMAL
BH CV LOWER VASCULAR RIGHT COMMON FEMORAL COMPRESS: NORMAL
BH CV LOWER VASCULAR RIGHT COMMON FEMORAL PHASIC: NORMAL
BH CV LOWER VASCULAR RIGHT COMMON FEMORAL SPONT: NORMAL
BH CV LOWER VASCULAR RIGHT DISTAL FEMORAL COMPRESS: NORMAL
BH CV LOWER VASCULAR RIGHT GREATER SAPH AK COMPRESS: NORMAL
BH CV LOWER VASCULAR RIGHT GREATER SAPH BK COMPRESS: NORMAL
BH CV LOWER VASCULAR RIGHT LESSER SAPH COMPRESS: NORMAL
BH CV LOWER VASCULAR RIGHT MID FEMORAL AUGMENT: NORMAL
BH CV LOWER VASCULAR RIGHT MID FEMORAL COMPETENT: NORMAL
BH CV LOWER VASCULAR RIGHT MID FEMORAL COMPRESS: NORMAL
BH CV LOWER VASCULAR RIGHT MID FEMORAL PHASIC: NORMAL
BH CV LOWER VASCULAR RIGHT MID FEMORAL SPONT: NORMAL
BH CV LOWER VASCULAR RIGHT PERONEAL COMPRESS: NORMAL
BH CV LOWER VASCULAR RIGHT POPLITEAL AUGMENT: NORMAL
BH CV LOWER VASCULAR RIGHT POPLITEAL COMPETENT: NORMAL
BH CV LOWER VASCULAR RIGHT POPLITEAL COMPRESS: NORMAL
BH CV LOWER VASCULAR RIGHT POPLITEAL PHASIC: NORMAL
BH CV LOWER VASCULAR RIGHT POPLITEAL SPONT: NORMAL
BH CV LOWER VASCULAR RIGHT POSTERIOR TIBIAL COMPRESS: NORMAL
BH CV LOWER VASCULAR RIGHT PROFUNDA FEMORAL COMPRESS: NORMAL
BH CV LOWER VASCULAR RIGHT PROXIMAL FEMORAL COMPRESS: NORMAL
BH CV LOWER VASCULAR RIGHT SAPHENOFEMORAL JUNCTION AUGMENT: NORMAL
BH CV LOWER VASCULAR RIGHT SAPHENOFEMORAL JUNCTION COMPETENT: NORMAL
BH CV LOWER VASCULAR RIGHT SAPHENOFEMORAL JUNCTION COMPRESS: NORMAL
BH CV LOWER VASCULAR RIGHT SAPHENOFEMORAL JUNCTION PHASIC: NORMAL
BH CV LOWER VASCULAR RIGHT SAPHENOFEMORAL JUNCTION SPONT: NORMAL
DIST ATA PSV LEFT: 43.9 CM/SEC
DIST ATA PSV RIGHT: 72.2 CM/SEC
DIST PTA PSV LEFT: 60.9 CM/SEC
DIST PTA PSV RIGHT: 98 CM/SEC
DIST SFA PSV LEFT: -78.4 CM/SEC
DIST SFA PSV RIGHT: -103 CM/SEC
LEFT CFA PROX SYS PSV: 207.4 CM/SEC
MID SFA PSV LEFT: -90.5 CM/SEC
MID SFA PSV RIGHT: -131 CM/SEC
PROX PFA PSV LEFT: -46.8 CM/SEC
PROX PFA PSV RIGHT: -43.9 CM/SEC
PROX SFA PSV LEFT: -106 CM/SEC
PROX SFA PSV RIGHT: -112 CM/SEC
RIGHT CFA PROX SYS PSV: -113 CM/SEC

## 2020-01-07 ENCOUNTER — TELEPHONE (OUTPATIENT)
Dept: CARDIOLOGY | Facility: CLINIC | Age: 55
End: 2020-01-07

## 2020-01-07 DIAGNOSIS — I73.9 PVD (PERIPHERAL VASCULAR DISEASE) WITH CLAUDICATION (HCC): Primary | ICD-10-CM

## 2020-01-07 DIAGNOSIS — I70.513: ICD-10-CM

## 2020-01-07 DIAGNOSIS — M79.605 PAIN IN BOTH LOWER EXTREMITIES: ICD-10-CM

## 2020-01-07 DIAGNOSIS — R93.6 ABNORMAL ULTRASOUND OF LOWER EXTREMITY: ICD-10-CM

## 2020-01-07 DIAGNOSIS — M79.604 PAIN IN BOTH LOWER EXTREMITIES: ICD-10-CM

## 2020-01-07 NOTE — TELEPHONE ENCOUNTER
Spoke with Ms. Tabares today about the results of her duplex of her bilateral lower extremities arterial and venous.  Informed no V DVT.  Also informed of mild to moderate stenosis and SFA and monophasic waveforms below the knee on the right side.  Patient continues to report significant bandlike tightness and pain in her bilateral lower extremities especially on the right.  Patient does have a history of aortobifem.  Will order CTA for further evaluation.  Patient verbalized understanding.  Also will get a BMP prior to the procedure to ensure function is okay.

## 2020-01-09 ENCOUNTER — ANTICOAGULATION VISIT (OUTPATIENT)
Dept: CARDIOLOGY | Facility: CLINIC | Age: 55
End: 2020-01-09

## 2020-01-09 LAB — INR PPP: 1.93 (ref 2–3)

## 2020-01-17 ENCOUNTER — RESULTS ENCOUNTER (OUTPATIENT)
Dept: CARDIOLOGY | Facility: CLINIC | Age: 55
End: 2020-01-17

## 2020-01-17 DIAGNOSIS — I82.409 ACUTE DEEP VEIN THROMBOSIS (DVT) OF LOWER EXTREMITY, UNSPECIFIED LATERALITY, UNSPECIFIED VEIN (HCC): ICD-10-CM

## 2020-01-24 ENCOUNTER — DOCUMENTATION (OUTPATIENT)
Dept: CARDIOLOGY | Facility: CLINIC | Age: 55
End: 2020-01-24

## 2020-01-24 DIAGNOSIS — I65.23 BILATERAL CAROTID ARTERY STENOSIS: Primary | ICD-10-CM

## 2020-01-28 DIAGNOSIS — L03.115 CELLULITIS OF LEG, RIGHT: Primary | ICD-10-CM

## 2020-01-28 RX ORDER — CEPHALEXIN 500 MG/1
500 CAPSULE ORAL 2 TIMES DAILY
Qty: 20 CAPSULE | Refills: 0 | Status: SHIPPED | OUTPATIENT
Start: 2020-01-28 | End: 2020-02-07

## 2020-01-31 ENCOUNTER — RESULTS ENCOUNTER (OUTPATIENT)
Dept: CARDIOLOGY | Facility: CLINIC | Age: 55
End: 2020-01-31

## 2020-01-31 DIAGNOSIS — I82.409 ACUTE DEEP VEIN THROMBOSIS (DVT) OF LOWER EXTREMITY, UNSPECIFIED LATERALITY, UNSPECIFIED VEIN (HCC): ICD-10-CM

## 2020-02-03 ENCOUNTER — HOSPITAL ENCOUNTER (OUTPATIENT)
Dept: CARDIOLOGY | Facility: HOSPITAL | Age: 55
Discharge: HOME | End: 2020-02-03
Admitting: NURSE PRACTITIONER

## 2020-02-03 DIAGNOSIS — I65.23 BILATERAL CAROTID ARTERY STENOSIS: ICD-10-CM

## 2020-02-03 PROCEDURE — 93880 EXTRACRANIAL BILAT STUDY: CPT | Performed by: INTERNAL MEDICINE

## 2020-02-03 PROCEDURE — 93880 EXTRACRANIAL BILAT STUDY: CPT

## 2020-02-04 ENCOUNTER — DOCUMENTATION (OUTPATIENT)
Dept: CARDIOLOGY | Facility: CLINIC | Age: 55
End: 2020-02-04

## 2020-02-04 RX ORDER — FLUCONAZOLE 150 MG/1
150 TABLET ORAL ONCE
Qty: 1 TABLET | Refills: 0 | Status: SHIPPED
Start: 2020-02-04 | End: 2020-02-04

## 2020-02-07 ENCOUNTER — RESULTS ENCOUNTER (OUTPATIENT)
Dept: CARDIOLOGY | Facility: CLINIC | Age: 55
End: 2020-02-07

## 2020-02-07 DIAGNOSIS — I73.9 PVD (PERIPHERAL VASCULAR DISEASE) WITH CLAUDICATION (HCC): ICD-10-CM

## 2020-02-12 ENCOUNTER — ANTICOAGULATION VISIT (OUTPATIENT)
Dept: CARDIOLOGY | Facility: CLINIC | Age: 55
End: 2020-02-12

## 2020-02-12 LAB — INR PPP: 2.09 (ref 2–3)

## 2020-02-14 ENCOUNTER — RESULTS ENCOUNTER (OUTPATIENT)
Dept: CARDIOLOGY | Facility: CLINIC | Age: 55
End: 2020-02-14

## 2020-02-14 DIAGNOSIS — I82.409 ACUTE DEEP VEIN THROMBOSIS (DVT) OF LOWER EXTREMITY, UNSPECIFIED LATERALITY, UNSPECIFIED VEIN (HCC): ICD-10-CM

## 2020-02-16 LAB
BH CV XLRA MEAS LEFT DIST CCA EDV: 28 CM/SEC
BH CV XLRA MEAS LEFT DIST CCA PSV: 96 CM/SEC
BH CV XLRA MEAS LEFT DIST ICA EDV: 48 CM/SEC
BH CV XLRA MEAS LEFT DIST ICA PSV: 177 CM/SEC
BH CV XLRA MEAS LEFT ICA/CCA RATIO: 1.8
BH CV XLRA MEAS LEFT MID ICA EDV: 25 CM/SEC
BH CV XLRA MEAS LEFT MID ICA PSV: 102 CM/SEC
BH CV XLRA MEAS LEFT PROX CCA EDV: 31 CM/SEC
BH CV XLRA MEAS LEFT PROX CCA PSV: 106 CM/SEC
BH CV XLRA MEAS LEFT PROX ECA EDV: 34 CM/SEC
BH CV XLRA MEAS LEFT PROX ECA PSV: 203 CM/SEC
BH CV XLRA MEAS LEFT PROX ICA EDV: 23 CM/SEC
BH CV XLRA MEAS LEFT PROX ICA PSV: 126 CM/SEC
BH CV XLRA MEAS RIGHT DIST CCA EDV: 27 CM/SEC
BH CV XLRA MEAS RIGHT DIST CCA PSV: 89 CM/SEC
BH CV XLRA MEAS RIGHT DIST ICA EDV: 47 CM/SEC
BH CV XLRA MEAS RIGHT DIST ICA PSV: 161 CM/SEC
BH CV XLRA MEAS RIGHT ICA/CCA RATIO: 1.8
BH CV XLRA MEAS RIGHT MID ICA EDV: 42 CM/SEC
BH CV XLRA MEAS RIGHT MID ICA PSV: 137 CM/SEC
BH CV XLRA MEAS RIGHT PROX CCA EDV: 21 CM/SEC
BH CV XLRA MEAS RIGHT PROX CCA PSV: 96 CM/SEC
BH CV XLRA MEAS RIGHT PROX ECA EDV: 25 CM/SEC
BH CV XLRA MEAS RIGHT PROX ECA PSV: 201 CM/SEC
BH CV XLRA MEAS RIGHT PROX ICA EDV: 22 CM/SEC
BH CV XLRA MEAS RIGHT PROX ICA PSV: 115 CM/SEC

## 2020-02-17 ENCOUNTER — TELEPHONE (OUTPATIENT)
Dept: CARDIOLOGY | Facility: CLINIC | Age: 55
End: 2020-02-17

## 2020-02-17 NOTE — TELEPHONE ENCOUNTER
Patient informed of test results, per ELLYN HINTON. Patient verbalized understanding. Nancy Valente LPN      ----- Message from JAMAAL Salazar sent at 2/16/2020 10:37 PM EST -----  Regarding: FW:  Normal carotid nonobstructive disease. Keep follow up  ----- Message -----  From: Miguel Tang MD  Sent: 2/16/2020  10:29 PM EST  To: JAMAAL Salazar

## 2020-02-20 DIAGNOSIS — I82.409 ACUTE DEEP VEIN THROMBOSIS (DVT) OF LOWER EXTREMITY, UNSPECIFIED LATERALITY, UNSPECIFIED VEIN (HCC): Primary | ICD-10-CM

## 2020-02-21 ENCOUNTER — RESULTS ENCOUNTER (OUTPATIENT)
Dept: CARDIOLOGY | Facility: CLINIC | Age: 55
End: 2020-02-21

## 2020-02-21 DIAGNOSIS — I82.409 ACUTE DEEP VEIN THROMBOSIS (DVT) OF LOWER EXTREMITY, UNSPECIFIED LATERALITY, UNSPECIFIED VEIN (HCC): ICD-10-CM

## 2020-02-28 ENCOUNTER — RESULTS ENCOUNTER (OUTPATIENT)
Dept: CARDIOLOGY | Facility: CLINIC | Age: 55
End: 2020-02-28

## 2020-02-28 DIAGNOSIS — I82.409 ACUTE DEEP VEIN THROMBOSIS (DVT) OF LOWER EXTREMITY, UNSPECIFIED LATERALITY, UNSPECIFIED VEIN (HCC): ICD-10-CM

## 2020-03-06 ENCOUNTER — RESULTS ENCOUNTER (OUTPATIENT)
Dept: CARDIOLOGY | Facility: CLINIC | Age: 55
End: 2020-03-06

## 2020-03-06 DIAGNOSIS — I82.409 ACUTE DEEP VEIN THROMBOSIS (DVT) OF LOWER EXTREMITY, UNSPECIFIED LATERALITY, UNSPECIFIED VEIN (HCC): ICD-10-CM

## 2020-03-10 ENCOUNTER — OFFICE VISIT (OUTPATIENT)
Dept: CARDIOLOGY | Facility: CLINIC | Age: 55
End: 2020-03-10

## 2020-03-10 VITALS
OXYGEN SATURATION: 99 % | BODY MASS INDEX: 33.43 KG/M2 | SYSTOLIC BLOOD PRESSURE: 134 MMHG | DIASTOLIC BLOOD PRESSURE: 52 MMHG | HEIGHT: 66 IN | HEART RATE: 76 BPM | WEIGHT: 208 LBS

## 2020-03-10 DIAGNOSIS — I73.9 PVD (PERIPHERAL VASCULAR DISEASE) WITH CLAUDICATION (HCC): ICD-10-CM

## 2020-03-10 DIAGNOSIS — R60.0 LEG EDEMA, RIGHT: ICD-10-CM

## 2020-03-10 DIAGNOSIS — M79.89 RIGHT LEG SWELLING: Primary | ICD-10-CM

## 2020-03-10 DIAGNOSIS — I10 ESSENTIAL HYPERTENSION: ICD-10-CM

## 2020-03-10 PROCEDURE — 99213 OFFICE O/P EST LOW 20 MIN: CPT | Performed by: NURSE PRACTITIONER

## 2020-03-10 RX ORDER — CETIRIZINE HYDROCHLORIDE 10 MG/1
10 TABLET ORAL DAILY
COMMUNITY

## 2020-03-10 RX ORDER — POTASSIUM CHLORIDE 750 MG/1
10 TABLET, FILM COATED, EXTENDED RELEASE ORAL DAILY
Qty: 30 TABLET | Refills: 11 | Status: SHIPPED | OUTPATIENT
Start: 2020-03-10 | End: 2021-04-20 | Stop reason: SDUPTHER

## 2020-03-10 NOTE — PATIENT INSTRUCTIONS
Steps to Quit Smoking    Smoking tobacco can be bad for your health. It can also affect almost every organ in your body. Smoking puts you and people around you at risk for many serious long-lasting (chronic) diseases. Quitting smoking is hard, but it is one of the best things that you can do for your health. It is never too late to quit.  What are the benefits of quitting smoking?  When you quit smoking, you lower your risk for getting serious diseases and conditions. They can include:  · Lung cancer or lung disease.  · Heart disease.  · Stroke.  · Heart attack.  · Not being able to have children (infertility).  · Weak bones (osteoporosis) and broken bones (fractures).  If you have coughing, wheezing, and shortness of breath, those symptoms may get better when you quit. You may also get sick less often. If you are pregnant, quitting smoking can help to lower your chances of having a baby of low birth weight.  What can I do to help me quit smoking?  Talk with your doctor about what can help you quit smoking. Some things you can do (strategies) include:  · Quitting smoking totally, instead of slowly cutting back how much you smoke over a period of time.  · Going to in-person counseling. You are more likely to quit if you go to many counseling sessions.  · Using resources and support systems, such as:  ? Online chats with a counselor.  ? Phone quitlines.  ? Printed self-help materials.  ? Support groups or group counseling.  ? Text messaging programs.  ? Mobile phone apps or applications.  · Taking medicines. Some of these medicines may have nicotine in them. If you are pregnant or breastfeeding, do not take any medicines to quit smoking unless your doctor says it is okay. Talk with your doctor about counseling or other things that can help you.  Talk with your doctor about using more than one strategy at the same time, such as taking medicines while you are also going to in-person counseling. This can help make  quitting easier.  What things can I do to make it easier to quit?  Quitting smoking might feel very hard at first, but there is a lot that you can do to make it easier. Take these steps:  · Talk to your family and friends. Ask them to support and encourage you.  · Call phone quitlines, reach out to support groups, or work with a counselor.  · Ask people who smoke to not smoke around you.  · Avoid places that make you want (trigger) to smoke, such as:  ? Bars.  ? Parties.  ? Smoke-break areas at work.  · Spend time with people who do not smoke.  · Lower the stress in your life. Stress can make you want to smoke. Try these things to help your stress:  ? Getting regular exercise.  ? Deep-breathing exercises.  ? Yoga.  ? Meditating.  ? Doing a body scan. To do this, close your eyes, focus on one area of your body at a time from head to toe, and notice which parts of your body are tense. Try to relax the muscles in those areas.  · Download or buy apps on your mobile phone or tablet that can help you stick to your quit plan. There are many free apps, such as QuitGuide from the CDC (Centers for Disease Control and Prevention). You can find more support from smokefree.gov and other websites.  This information is not intended to replace advice given to you by your health care provider. Make sure you discuss any questions you have with your health care provider.  Document Released: 10/14/2010 Document Revised: 08/15/2017 Document Reviewed: 05/03/2016  [employer] Interactive Patient Education © 2020 [employer]  BMI for Adults    Body mass index (BMI) is a number that is calculated from a person's weight and height. BMI may help to estimate how much of a person's weight is composed of fat. BMI can help identify those who may be at higher risk for certain medical problems.  How is BMI used with adults?  BMI is used as a screening tool to identify possible weight problems. It is used to check whether a person is obese,  "overweight, healthy weight, or underweight.  How is BMI calculated?  BMI measures your weight and compares it to your height. This can be done either in English ([place] or metric measurements. Note that charts are available to help you find your BMI quickly and easily without having to do these calculations yourself.  To calculate your BMI in English ([place] measurements, your health care provider will:  1. Measure your weight in pounds (lb).  2. Multiply the number of pounds by 703.  ? For example, for a person who weighs 180 lb, multiply that number by 703, which equals 126,540.  3. Measure your height in inches (in). Then multiply that number by itself to get a measurement called \"inches squared.\"  ? For example, for a person who is 70 in tall, the \"inches squared\" measurement is 70 in x 70 in, which equals 4900 inches squared.  4. Divide the total from Step 2 (number of lb x 703) by the total from Step 3 (inches squared): 126,540 ÷ 4900 = 25.8. This is your BMI.  To calculate your BMI in metric measurements, your health care provider will:  1. Measure your weight in kilograms (kg).  2. Measure your height in meters (m). Then multiply that number by itself to get a measurement called \"meters squared.\"  ? For example, for a person who is 1.75 m tall, the \"meters squared\" measurement is 1.75 m x 1.75 m, which is equal to 3.1 meters squared.  3. Divide the number of kilograms (your weight) by the meters squared number. In this example: 70 ÷ 3.1 = 22.6. This is your BMI.  How is BMI interpreted?  To interpret your results, your health care provider will use BMI charts to identify whether you are underweight, normal weight, overweight, or obese. The following guidelines will be used:  · Underweight: BMI less than 18.5.  · Normal weight: BMI between 18.5 and 24.9.  · Overweight: BMI between 25 and 29.9.  · Obese: BMI of 30 and above.  Please note:  · Weight includes both fat and muscle, so someone with a muscular build, " such as an athlete, may have a BMI that is higher than 24.9. In cases like these, BMI is not an accurate measure of body fat.  · To determine if excess body fat is the cause of a BMI of 25 or higher, further assessments may need to be done by a health care provider.  · BMI is usually interpreted in the same way for men and women.  Why is BMI a useful tool?  BMI is useful in two ways:  · Identifying a weight problem that may be related to a medical condition, or that may increase the risk for medical problems.  · Promoting lifestyle and diet changes in order to reach a healthy weight.  Summary  · Body mass index (BMI) is a number that is calculated from a person's weight and height.  · BMI may help to estimate how much of a person's weight is composed of fat. BMI can help identify those who may be at higher risk for certain medical problems.  · BMI can be measured using English measurements or metric measurements.  · To interpret your results, your health care provider will use BMI charts to identify whether you are underweight, normal weight, overweight, or obese.  This information is not intended to replace advice given to you by your health care provider. Make sure you discuss any questions you have with your health care provider.  Document Released: 08/29/2005 Document Revised: 10/31/2018 Document Reviewed: 10/31/2018  [employer] Interactive Patient Education © 2020 [employer]    Lymphedema    Lymphedema is swelling that is caused by the abnormal collection of lymph in the tissues under the skin. Lymph is fluid from the tissues in your body that is removed through the lymphatic system. This system is part of your body's defense system (immune system) and includes lymph nodes and lymph vessels. The lymph vessels collect and carry the excess fluid, fats, proteins, and wastes from the tissues of the body to the bloodstream. This system also works to clean and remove bacteria and waste products from the  body.  Lymphedema occurs when the lymphatic system is blocked. When the lymph vessels or lymph nodes are blocked or damaged, lymph does not drain properly. This causes an abnormal buildup of lymph, which leads to swelling in the affected area. This may include the trunk area, or an arm or leg. Lymphedema cannot be cured by medicines, but various methods can be used to help reduce the swelling.  There are two types of lymphedema: primary lymphedema and secondary lymphedema.  What are the causes?  The cause of this condition depends on the type of lymphedema that you have.  · Primary lymphedema is caused by the absence of lymph vessels or having abnormal lymph vessels at birth.  · Secondary lymphedema occurs when lymph vessels are blocked or damaged. Secondary lymphedema is more common. Common causes of lymph vessel blockage include:  ? Skin infection, such as cellulitis.  ? Infection by parasites (filariasis).  ? Injury.  ? Radiation therapy.  ? Cancer.  ? Formation of scar tissue.  ? Surgery.  What are the signs or symptoms?  Symptoms of this condition include:  · Swelling of the arm or leg.  · A heavy or tight feeling in the arm or leg.  · Swelling of the feet, toes, or fingers. Shoes or rings may fit more tightly than before.  · Redness of the skin over the affected area.  · Limited movement of the affected limb.  · Sensitivity to touch or discomfort in the affected limb.  How is this diagnosed?  This condition may be diagnosed based on:  · Your symptoms and medical history.  · A physical exam.  · Bioimpedance spectroscopy. In this test, painless electrical currents are used to measure fluid levels in your body.  · Imaging tests, such as:  ? Lymphoscintigraphy. In this test, a low dose of a radioactive substance is injected to trace the flow of lymph through the lymph vessels.  ? MRI.  ? CT scan.  ? Duplex ultrasound. This test uses sound waves to produce images of the vessels and the blood flow on a  screen.  ? Lymphangiography. In this test, a contrast dye is injected into the lymph vessel to help show blockages.  How is this treated?  Treatment for this condition may depend on the cause of your lymphedema. Treatment may include:  · Complete decongestive therapy (CDT). This is done by a certified lymphedema therapist to reduce fluid congestion. This therapy includes:  ? Manual lymph drainage. This is a special massage technique that promotes lymph drainage out of a limb.  ? Skin care.  ? Compression wrapping of the affected area.  ? Specific exercises. Certain exercises can help fluid move out of the affected limb.  · Compression. Various methods may be used to apply pressure to the affected limb to reduce the swelling. They include:  ? Wearing compression stockings or sleeves on the affected limb.  ? Wrapping the affected limb with special bandages.  · Surgery. This is usually done for severe cases only. For example, surgery may be done if you have trouble moving the limb or if the swelling does not get better with other treatments.  If an underlying condition is causing the lymphedema, treatment for that condition will be done. For example, antibiotic medicines may be used to treat an infection.  Follow these instructions at home:  Self-care  · The affected area is more likely to become injured or infected. Take these steps to help prevent infection:  ? Keep the affected area clean and dry.  ? Use approved creams or lotions to keep the skin moisturized.  ? Protect your skin from cuts:  § Use gloves while cooking or gardening.  § Do not walk barefoot.  § If you shave the affected area, use an electric razor.  · Do not wear tight clothes, shoes, or jewelry.  · Eat a healthy diet that includes a lot of fruits and vegetables.  Activity  · Exercise regularly as directed by your health care provider.  · Do not sit with your legs crossed.  · When possible, keep the affected limb raised (elevated) above the level of  your heart.  · Avoid carrying things with an arm that is affected by lymphedema.  General instructions  · Wear compression stockings or sleeves as told by your health care provider.  · Note any changes in size of the affected limb. You may be instructed to take regular measurements and keep track of them.  · Take over-the-counter and prescription medicines only as told by your health care provider.  · If you were prescribed an antibiotic medicine, take or apply it as told by your health care provider. Do not stop using the antibiotic even if you start to feel better.  · Do not use heating pads or ice packs over the affected area.  · Avoid having blood draws, IV insertions, or blood pressure checked on the affected limb.  · Keep all follow-up visits as told by your health care provider. This is important.  Contact a health care provider if you:  · Continue to have swelling in your limb.  · Have a cut that does not heal.  · Have redness or pain in the affected area.  Get help right away if you:  · Have new swelling in your limb that comes on suddenly.  · Develop purplish spots, rash or sores (lesions) on your affected limb.  · Have shortness of breath.  · Have a fever or chills.  Summary  · Lymphedema is swelling that is caused by the abnormal collection of lymph in the tissues under the skin.  · Lymph is fluid from the tissues in your body that is removed through the lymphatic system. This system collects and carries excess fluid, fats, proteins, and wastes from the tissues of the body to the bloodstream.  · Lymphedema causes swelling, pain, and redness in the affected area. This may include the trunk area, or an arm or leg.  · Treatment for this condition may depend on the cause of your lymphedema. Treatment may include complete decongestive therapy (CDT), compression methods, surgery, or treating the underlying cause.  This information is not intended to replace advice given to you by your health care provider.  Make sure you discuss any questions you have with your health care provider.  Document Released: 10/14/2008 Document Revised: 12/31/2018 Document Reviewed: 12/31/2018  [employer] Interactive Patient Education © 2020 [employer]    Acute Coronary Syndrome    Acute coronary syndrome (ACS) is a serious problem in which there is suddenly not enough blood and oxygen reaching the heart. ACS can result in chest pain or a heart attack.  This condition is a medical emergency. If you have any symptoms of this condition, get help right away.  What are the causes?  This condition may be caused by:  · Buildup of fat and cholesterol inside of the arteries (atherosclerosis). This is the most common cause. The buildup (plaque) can cause blood vessels in the heart (coronary arteries) to become narrow or blocked, which reduces blood flow to the heart. Plaque can also break off and lead to a clot, which can block an artery and cause a heart attack or stroke.  · Sudden tightening of the muscles around the coronary arteries (coronary spasm).  · Tearing of a coronary artery (spontaneous coronary artery dissection).  · Very low blood pressure (hypotension).  · An abnormal heartbeat (arrhythmia).  · Other medical conditions that cause a decrease of oxygen to the heart, such as anemiaorrespiratory failure.  · Using cocaine or methamphetamine.  What increases the risk?  The following factors may make you more likely to develop this condition:  · Age. The risk for ACS increases as you get older.  · History of chest pain, heart attack, peripheral artery disease, or stroke.  · Having taken chemotherapy or immune-suppressing medicines.  · Being male.  · Family history of chest pain, heart disease, or stroke.  · Smoking.  · Not exercising enough.  · Being overweight.  · High cholesterol.  · High blood pressure (hypertension).  · Diabetes.  · Excessive alcohol use.  What are the signs or symptoms?  Common symptoms of this condition  include:  · Chest pain. The pain may last a long time, or it may stop and come back (recur). It may feel like:  ? Crushing or squeezing.  ? Tightness, pressure, fullness, or heaviness.  · Arm, neck, jaw, or back pain.  · Heartburn or indigestion.  · Shortness of breath.  · Nausea.  · Sudden cold sweats.  · Light-headedness.  · Dizziness, or passing out.  · Tiredness (fatigue).  Sometimes there are no symptoms.  How is this diagnosed?  This condition may be diagnosed based on:  · Your medical history and symptoms.  · An electrocardiogram (ECG). This imaging test measures the heart's electrical activity.  · Blood tests. Cardiac blood tests may need to be repeated at designated time intervals.  · Chest X-ray.  · A CT scan of the chest.  · A coronary angiogram. This is a procedure in which dye is injected into the bloodstream and then X-rays are taken to show if there is a blockage in a coronary artery.  · Exercise stress testing.  · Echocardiography. This is a test that uses sound waves to produce detailed images of the heart.  How is this treated?  The treatment is to restore blood flow to the heart as soon as possible. Treatment for this condition may include:  · Oxygen therapy.  · Medicines, such as:  ? Antiplatelet medicines and blood-thinning medicines, such as aspirin. These help prevent blood clots.  ? Medicine that dissolves any blood clots (fibrinolytic therapy).  ? Blood pressure medicines.  ? Nitroglycerin. This helps relieve chest pain and widens blood vessels to improve blood flow.  ? Pain medicine.  ? Cholesterol-lowering medicine.  · Surgery, such as:  ? Coronary angioplasty with stent placement. This involves placing a small piece of metal that looks like mesh or a spring into a narrow coronary artery. This widens the artery and keep it open.  ? Coronary artery bypass surgery. This involves taking a section of a blood vessel from a different part of your body, and placing it on the blocked coronary  artery to allow blood to flow around (bypass) the blockage.  · Cardiac rehabilitation. This is a program that helps improve your health and well-being. It includes exercise training, education, and counseling to help you recover.  Follow these instructions at home:  Eating and drinking  · Eat a heart-healthy diet that includes whole grains, fruits and vegetables, lean proteins, and low-fat or nonfat dairy products.  · Limit how much salt (sodium) you eat as told by your health care provider. Follow instructions from your health care provider about any other eating or drinking restrictions, such as limiting foods that are high in fat and processed sugars.  · Use healthy cooking methods such as roasting, grilling, broiling, baking, poaching, steaming, or stir-frying.  · Talk with a dietitian to learn about healthy cooking methods and how to eat less sodium.  Medicines  · Take over-the-counter and prescription medicines only as told by your health care provider.  · Do not take these medicines unless your health care provider approves:  ? Vitamin supplements that contain vitamin A or vitamin E.  ? Nonsteroidal anti-inflammatory drugs (NSAIDs), such as ibuprofen, naproxen, or celecoxib.  ? Hormone replacement therapy that contains estrogen.  If you are taking blood thinners:  · Talk with your health care provider before you take any medicines that contain aspirin or NSAIDs. These medicines increase your risk for dangerous bleeding.  · Take your medicine exactly as told, at the same time every day.  · Avoid activities that could cause injury or bruising, and follow instructions about how to prevent falls.  · Wear a medical alert bracelet, and carry a card that lists what medicines you take.  Activity  · Join a cardiac rehabilitation program. An exercise plan will be developed for you.  · Ask your health care provider:  ? What activities and exercises are safe for you.  ? If you should follow specific instructions about  lifting, driving, or climbing stairs.  Lifestyle  · Do not use any products that contain nicotine or tobacco, such as cigarettes and e-cigarettes. If you need help quitting, ask your health care provider.  · If your health care provider says that alcohol is safe for you, limit your alcohol intake to no more than 1 drink a day. One drink equals 12 oz of beer, 5 oz of wine, or 1½ oz of hard liquor.  · Maintain a healthy weight. If you need to lose weight, work with your health care provider to do so safely.  General instructions  · Tell all the health care providers who care for you about your heart condition, including your dentist. This may affect the medicines or treatment you receive.  · Manage any other health conditions you have, such as hypertension or diabetes. These conditions affect your heart.  · Learn ways to manage stress.  · Get screened for depression, and get mental health treatment if you need it. People with ACS are at higher risk for depression.  · Keep your vaccinations up to date. Get the flu shot (influenza vaccine) every year.  · If directed, monitor your blood pressure at home.  · Keep all follow-up visits as told by your health care provider. This is important.  Contact a health care provider if:  · You feel overwhelmed or sad.  · You have trouble doing your daily activities.  Get help right away if:  · You have pain in your chest, neck, arm, jaw, stomach, or back that recurs, and:  ? It lasts for more than a few minutes.  ? It is not relieved by taking the medicineyour health care provider prescribed.  · You have unexplained:  ? Heavy sweating.  ? Heartburn or indigestion.  ? Nausea or vomiting.  ? Shortness of breath.  ? Difficulty breathing.  ? Fatigue.  ? Nervousness or anxiety.  ? Weakness.  ? Diarrhea.  ? Dark stools or blood in your stool.  · You have sudden light-headedness or dizziness.  · Your blood pressure is higher than 180/120.  · You faint.  · You have thoughts about hurting  yourself.  These symptoms may represent a serious problem that is an emergency. Do not wait to see if the symptoms will go away. Get medical help right away. Call your local emergency services (911 in the [place] Do not drive yourself to the hospital.   If you ever feel like you may hurt yourself or others, or have thoughts about taking your own life, get help right away. You can go to your nearest emergency department or call:  · Emergency services (911 in the [place]  · A suicide crisis helpline, such as the National Suicide Prevention Lifeline at 1-465.754.2249. This is open 24 hours a day.  Summary  · Acute coronary syndrome (ACS) is when there is not enough blood and oxygen being supplied to the heart. ACS can result in chest pain or a heart attack.  · Acute coronary syndrome is a medical emergency. If you have any symptoms of this condition, get help right away.  · Treatment includes medicines and procedures to open the blocked arteries and restore blood flow.  This information is not intended to replace advice given to you by your health care provider. Make sure you discuss any questions you have with your health care provider.  Document Released: 12/18/2006 Document Revised: 08/28/2018 Document Reviewed: 08/28/2018  [employer] Interactive Patient Education © 2019 [employer]

## 2020-03-10 NOTE — PROGRESS NOTES
Subjective     Vannessa Contreras is a 54 y.o. female who presents to day for Peripheral Vascular Disease (Testing in December and January) and Coronary Artery Disease.    CHIEF COMPLIANT  Chief Complaint   Patient presents with   • Peripheral Vascular Disease     Testing in December and January   • Coronary Artery Disease       Active Problems:  Problem List Items Addressed This Visit        Cardiovascular and Mediastinum    Essential hypertension    Relevant Orders    Adult Transthoracic Echo Complete W/ Cont if Necessary Per Protocol      Other Visit Diagnoses     Right leg swelling    -  Primary    Relevant Medications    potassium chloride (K-DUR) 10 MEQ CR tablet    Other Relevant Orders    Adult Transthoracic Echo Complete W/ Cont if Necessary Per Protocol    PVD (peripheral vascular disease) with claudication (CMS/HCC)        Relevant Orders    Adult Transthoracic Echo Complete W/ Cont if Necessary Per Protocol    Leg edema, right        Relevant Orders    Adult Transthoracic Echo Complete W/ Cont if Necessary Per Protocol          HPI  HPI  Ms. Tabares is a 54-year-old female who is being followed up today for peripheral vascular disease who is underwent recent testing of the peripheral vascular system as well as the carotid arteries.  We also treated a right lower extremity cellulitis diagnosed by CT which had little to no impact.  Patient's right lower extremity is still chronically swollen.  Pulses are palpable.  We have also ruled out likelihood of DVT.  Patient reports no change in her right lower extremity.  Previously was told that it could potentially be lymphedema.  Otherwise patient is doing well from the cardiovascular standpoint and had nonobstructive core rotted artery disease.  Patient denies chest pain, shortness of breath, palpitations, PND, orthopnea, syncope, or neurological changes.  PRIOR MEDS  Current Outpatient Medications on File Prior to Visit   Medication Sig Dispense Refill   •  aspirin (ASPIRIN LOW DOSE) 81 MG EC tablet Take 81 mg by mouth 2 (Two) Times a Day.     • atorvastatin (LIPITOR) 40 MG tablet TAKE 1 TABLET BY MOUTH EVERY NIGHT AT BEDTIME 30 tablet 0   • cetirizine (zyrTEC) 10 MG tablet Take 10 mg by mouth Daily.     • furosemide (LASIX) 20 MG tablet Take 20 mg by mouth Daily.  11   • losartan (COZAAR) 100 MG tablet Take 100 mg by mouth Daily.     • meloxicam (MOBIC) 15 MG tablet Take 15 mg by mouth Daily.     • metoprolol tartrate (LOPRESSOR) 50 MG tablet Take 50 mg by mouth 2 (Two) Times a Day.  12   • Multiple Vitamins-Minerals (MULTIVITAMIN ADULT PO) Take  by mouth Daily.     • nitroglycerin (NITROSTAT) 0.4 MG SL tablet Place 0.4 mg under the tongue Every 5 (Five) Minutes As Needed.     • Omega-3 Fatty Acids (EQL FISH OIL) 1200 MG capsule Take 1 tablet by mouth 2 (Two) Times a Day.     • warfarin (COUMADIN) 4 MG tablet Take 4 mg by mouth. Takes 6 mg M/W/Sat and 4 mg all other days     • warfarin (COUMADIN) 6 MG tablet TAKE 1 TABLET BY MOUTH EVERY WEDNESDAY (Patient taking differently: Take 6 mg by mouth. M, W and Sat) 30 tablet 5     No current facility-administered medications on file prior to visit.        ALLERGIES  Patient has no known allergies.    HISTORY  Past Medical History:   Diagnosis Date   • Arterial insufficiency (CMS/HCC)    • Atherosclerosis of arteries of extremities (CMS/HCC)    • CAD (coronary artery disease)    • DVT (deep venous thrombosis) (CMS/HCC)     h.o - on coumadin    • Fatigue    • Hyperlipidemia    • Hypertension    • Palpitations    • PVD (peripheral vascular disease) (CMS/HCC)    • SOB (shortness of breath)        Social History     Socioeconomic History   • Marital status:      Spouse name: Not on file   • Number of children: Not on file   • Years of education: Not on file   • Highest education level: Not on file   Tobacco Use   • Smoking status: Current Every Day Smoker     Packs/day: 1.00   • Tobacco comment: also states former smoker  "03/03/16   Substance and Sexual Activity   • Alcohol use: Yes     Comment: Social        Family History   Problem Relation Age of Onset   • Atrial fibrillation Mother    • Heart attack Father    • Diabetes Sister    • Hypertension Sister    • Stroke Maternal Grandmother    • Peripheral vascular disease Other        Review of Systems   Constitutional: Negative.  Negative for chills, fatigue and fever.   HENT: Negative.  Negative for congestion.    Eyes: Negative.  Negative for visual disturbance.   Respiratory: Negative.  Negative for chest tightness and shortness of breath.    Cardiovascular: Positive for leg swelling (rt foot). Negative for chest pain and palpitations.   Gastrointestinal: Negative.  Negative for abdominal pain, blood in stool, constipation, diarrhea, nausea and vomiting.   Endocrine: Negative.  Negative for cold intolerance and heat intolerance.   Genitourinary: Negative.  Negative for dysuria, frequency, hematuria and urgency.   Musculoskeletal: Negative.  Negative for back pain and neck pain.   Skin: Negative.  Negative for rash and wound.   Allergic/Immunologic: Positive for environmental allergies.   Neurological: Negative.  Negative for dizziness, syncope and light-headedness.   Hematological: Negative.  Does not bruise/bleed easily.   Psychiatric/Behavioral: Negative.  Negative for sleep disturbance (denies problems sleeping, denies waking with soa or cp).       Objective     VITALS: /52 (BP Location: Left arm, Patient Position: Sitting)   Pulse 76   Ht 167.6 cm (66\")   Wt 94.3 kg (208 lb)   SpO2 99%   BMI 33.57 kg/m²     LABS:   Lab Results (most recent)     None          IMAGING:   No Images in the past 120 days found..    EXAM:  Physical Exam   Constitutional: She is oriented to person, place, and time. She appears well-developed and well-nourished.   HENT:   Head: Normocephalic and atraumatic.   Eyes: Pupils are equal, round, and reactive to light. EOM are normal.   Neck: " Trachea normal and phonation normal. Neck supple. No JVD present. Carotid bruit is not present. No thyroid mass present.   Cardiovascular: Normal rate, regular rhythm, normal heart sounds and intact distal pulses. Exam reveals no gallop and no friction rub.   No murmur heard.  Pulses:       Radial pulses are 2+ on the right side, and 2+ on the left side.        Posterior tibial pulses are 2+ on the right side, and 2+ on the left side.   Pulmonary/Chest: Effort normal. No respiratory distress. She has wheezes in the right upper field, the right middle field, the left upper field and the left middle field. She has no rales.   Abdominal: Soft. Bowel sounds are normal. She exhibits no distension and no abdominal bruit. There is no tenderness.   Musculoskeletal: Normal range of motion. She exhibits edema (rLE edema 3+).   Neurological: She is alert and oriented to person, place, and time. She has normal strength. No cranial nerve deficit or sensory deficit.   Skin: Skin is warm, dry and intact. Capillary refill takes less than 2 seconds. No rash noted.   Psychiatric: She has a normal mood and affect. Her speech is normal and behavior is normal. Judgment and thought content normal. Cognition and memory are normal.   Nursing note and vitals reviewed.      Procedure   Procedures       Assessment/Plan    Diagnosis Plan   1. Right leg swelling  potassium chloride (K-DUR) 10 MEQ CR tablet    Adult Transthoracic Echo Complete W/ Cont if Necessary Per Protocol   2. Essential hypertension  Adult Transthoracic Echo Complete W/ Cont if Necessary Per Protocol   3. PVD (peripheral vascular disease) with claudication (CMS/HCC)  Adult Transthoracic Echo Complete W/ Cont if Necessary Per Protocol   4. Leg edema, right  Adult Transthoracic Echo Complete W/ Cont if Necessary Per Protocol   1.  Patient does have continuation of swelling of right lower extremity.  Did not respond to antibiotic therapy or diuretic therapy.  This is likely  lymphedema.  Discussed compression stockings.  2.  Patient's blood pressure is well controlled on current blood pressure medication regimen.  No medication changes are warranted at this time.  Patient advised to monitor blood pressure on a daily basis and report any persistent highs or lows.  Set goal blood pressure for patient at 130/80 or below.  3.  Patient identified patent three-vessel runoff on her abdominal aortic gram with bilateral runoff.  No further interventions at this time.  3.  Patient is on Coumadin and has routine INRs.  Patient advised not to have any disruption in the schedule.  4.  Informed of signs and symptoms of ACS and advised to seek emergent treatment for any new worsening symptoms.  Patient also advised sooner follow-up as needed.  Also advised to follow-up with family doctor as needed    Return in about 3 months (around 6/10/2020), or if symptoms worsen or fail to improve.    Vannessa was seen today for peripheral vascular disease and coronary artery disease.    Diagnoses and all orders for this visit:    Right leg swelling  -     potassium chloride (K-DUR) 10 MEQ CR tablet; Take 1 tablet by mouth Daily.  -     Adult Transthoracic Echo Complete W/ Cont if Necessary Per Protocol; Future    Essential hypertension  -     Adult Transthoracic Echo Complete W/ Cont if Necessary Per Protocol; Future    PVD (peripheral vascular disease) with claudication (CMS/HCC)  -     Adult Transthoracic Echo Complete W/ Cont if Necessary Per Protocol; Future    Leg edema, right  -     Adult Transthoracic Echo Complete W/ Cont if Necessary Per Protocol; Future        Vannessa Contreras  reports that she has been smoking. She has been smoking about 1.00 pack per day. She does not have any smokeless tobacco history on file.. I have educated her on the risk of diseases from using tobacco products such as cancer, COPD and heart diease.     I advised her to quit and she is not willing to quit.    I spent 3  minutes  counseling the patient.           Patient's Body mass index is 33.57 kg/m². BMI is above normal parameters. Recommendations include: educational material.           MEDS ORDERED DURING VISIT:  New Medications Ordered This Visit   Medications   • potassium chloride (K-DUR) 10 MEQ CR tablet     Sig: Take 1 tablet by mouth Daily.     Dispense:  30 tablet     Refill:  11           This document has been electronically signed by Rush Cruz Jr., JAMAAL  March 11, 2020 08:41

## 2020-03-13 ENCOUNTER — RESULTS ENCOUNTER (OUTPATIENT)
Dept: CARDIOLOGY | Facility: CLINIC | Age: 55
End: 2020-03-13

## 2020-03-13 DIAGNOSIS — I82.409 ACUTE DEEP VEIN THROMBOSIS (DVT) OF LOWER EXTREMITY, UNSPECIFIED LATERALITY, UNSPECIFIED VEIN (HCC): ICD-10-CM

## 2020-03-20 ENCOUNTER — RESULTS ENCOUNTER (OUTPATIENT)
Dept: CARDIOLOGY | Facility: CLINIC | Age: 55
End: 2020-03-20

## 2020-03-20 DIAGNOSIS — I82.409 ACUTE DEEP VEIN THROMBOSIS (DVT) OF LOWER EXTREMITY, UNSPECIFIED LATERALITY, UNSPECIFIED VEIN (HCC): ICD-10-CM

## 2020-03-27 ENCOUNTER — RESULTS ENCOUNTER (OUTPATIENT)
Dept: CARDIOLOGY | Facility: CLINIC | Age: 55
End: 2020-03-27

## 2020-03-27 DIAGNOSIS — I82.409 ACUTE DEEP VEIN THROMBOSIS (DVT) OF LOWER EXTREMITY, UNSPECIFIED LATERALITY, UNSPECIFIED VEIN (HCC): ICD-10-CM

## 2020-03-30 ENCOUNTER — HOSPITAL ENCOUNTER (OUTPATIENT)
Dept: CARDIOLOGY | Facility: HOSPITAL | Age: 55
Discharge: HOME | End: 2020-03-30
Admitting: NURSE PRACTITIONER

## 2020-03-30 VITALS — WEIGHT: 207.89 LBS | HEIGHT: 66 IN | BODY MASS INDEX: 33.41 KG/M2

## 2020-03-30 DIAGNOSIS — I10 ESSENTIAL HYPERTENSION: ICD-10-CM

## 2020-03-30 DIAGNOSIS — I73.9 PVD (PERIPHERAL VASCULAR DISEASE) WITH CLAUDICATION (HCC): ICD-10-CM

## 2020-03-30 DIAGNOSIS — M79.89 RIGHT LEG SWELLING: ICD-10-CM

## 2020-03-30 DIAGNOSIS — R60.0 LEG EDEMA, RIGHT: ICD-10-CM

## 2020-03-30 PROCEDURE — 93306 TTE W/DOPPLER COMPLETE: CPT | Performed by: INTERNAL MEDICINE

## 2020-03-30 PROCEDURE — 93306 TTE W/DOPPLER COMPLETE: CPT

## 2020-04-03 ENCOUNTER — RESULTS ENCOUNTER (OUTPATIENT)
Dept: CARDIOLOGY | Facility: CLINIC | Age: 55
End: 2020-04-03

## 2020-04-03 DIAGNOSIS — I82.409 ACUTE DEEP VEIN THROMBOSIS (DVT) OF LOWER EXTREMITY, UNSPECIFIED LATERALITY, UNSPECIFIED VEIN (HCC): ICD-10-CM

## 2020-04-06 ENCOUNTER — TELEPHONE (OUTPATIENT)
Dept: CARDIOLOGY | Facility: CLINIC | Age: 55
End: 2020-04-06

## 2020-04-06 DIAGNOSIS — I51.89 GRADE II DIASTOLIC DYSFUNCTION: Primary | ICD-10-CM

## 2020-04-06 LAB
AORTIC DIMENSIONLESS INDEX: 0.8 (DI)
BH CV ECHO MEAS - ACS: 1.4 CM
BH CV ECHO MEAS - AO MAX PG (FULL): 2.5 MMHG
BH CV ECHO MEAS - AO MAX PG: 6.6 MMHG
BH CV ECHO MEAS - AO MEAN PG (FULL): 1 MMHG
BH CV ECHO MEAS - AO MEAN PG: 3 MMHG
BH CV ECHO MEAS - AO ROOT AREA (BSA CORRECTED): 1.5
BH CV ECHO MEAS - AO ROOT AREA: 7.1 CM^2
BH CV ECHO MEAS - AO ROOT DIAM: 3 CM
BH CV ECHO MEAS - AO V2 MAX: 128 CM/SEC
BH CV ECHO MEAS - AO V2 MEAN: 77.6 CM/SEC
BH CV ECHO MEAS - AO V2 VTI: 29.7 CM
BH CV ECHO MEAS - BSA(HAYCOCK): 2.1 M^2
BH CV ECHO MEAS - BSA: 2 M^2
BH CV ECHO MEAS - BZI_BMI: 33.6 KILOGRAMS/M^2
BH CV ECHO MEAS - BZI_METRIC_HEIGHT: 167.6 CM
BH CV ECHO MEAS - BZI_METRIC_WEIGHT: 94.3 KG
BH CV ECHO MEAS - EDV(CUBED): 116.2 ML
BH CV ECHO MEAS - EDV(TEICH): 111.7 ML
BH CV ECHO MEAS - EF(CUBED): 75.3 %
BH CV ECHO MEAS - EF(TEICH): 67.1 %
BH CV ECHO MEAS - ESV(CUBED): 28.7 ML
BH CV ECHO MEAS - ESV(TEICH): 36.7 ML
BH CV ECHO MEAS - FS: 37.3 %
BH CV ECHO MEAS - IVS/LVPW: 1.4
BH CV ECHO MEAS - IVSD: 1.3 CM
BH CV ECHO MEAS - LA DIMENSION(2D): 3.7 CM
BH CV ECHO MEAS - LA DIMENSION: 3.8 CM
BH CV ECHO MEAS - LA/AO: 1.3
BH CV ECHO MEAS - LAT PEAK E' VEL: 7.5 CM/SEC
BH CV ECHO MEAS - LV IVRT: 0.12 SEC
BH CV ECHO MEAS - LV MASS(C)D: 193.7 GRAMS
BH CV ECHO MEAS - LV MASS(C)DI: 95.2 GRAMS/M^2
BH CV ECHO MEAS - LV MAX PG: 4.1 MMHG
BH CV ECHO MEAS - LV MEAN PG: 2 MMHG
BH CV ECHO MEAS - LV V1 MAX: 101 CM/SEC
BH CV ECHO MEAS - LV V1 MEAN: 64.6 CM/SEC
BH CV ECHO MEAS - LV V1 VTI: 29.1 CM
BH CV ECHO MEAS - LVIDD: 4.9 CM
BH CV ECHO MEAS - LVIDS: 3.1 CM
BH CV ECHO MEAS - LVPWD: 0.9 CM
BH CV ECHO MEAS - MED PEAK E' VEL: 9 CM/SEC
BH CV ECHO MEAS - MR MAX PG: 15.5 MMHG
BH CV ECHO MEAS - MR MAX VEL: 197 CM/SEC
BH CV ECHO MEAS - MV A MAX VEL: 91.2 CM/SEC
BH CV ECHO MEAS - MV DEC SLOPE: 556 CM/SEC^2
BH CV ECHO MEAS - MV DEC TIME: 0.22 SEC
BH CV ECHO MEAS - MV E MAX VEL: 112 CM/SEC
BH CV ECHO MEAS - MV E/A: 1.2
BH CV ECHO MEAS - MV MAX PG: 5.7 MMHG
BH CV ECHO MEAS - MV MEAN PG: 2 MMHG
BH CV ECHO MEAS - MV P1/2T MAX VEL: 117 CM/SEC
BH CV ECHO MEAS - MV P1/2T: 61.6 MSEC
BH CV ECHO MEAS - MV V2 MAX: 119 CM/SEC
BH CV ECHO MEAS - MV V2 MEAN: 62 CM/SEC
BH CV ECHO MEAS - MV V2 VTI: 33.8 CM
BH CV ECHO MEAS - MVA P1/2T LCG: 1.9 CM^2
BH CV ECHO MEAS - MVA(P1/2T): 3.6 CM^2
BH CV ECHO MEAS - PA MAX PG (FULL): 1.6 MMHG
BH CV ECHO MEAS - PA MAX PG: 6.3 MMHG
BH CV ECHO MEAS - PA MEAN PG (FULL): 1 MMHG
BH CV ECHO MEAS - PA MEAN PG: 3 MMHG
BH CV ECHO MEAS - PA V2 MAX: 125 CM/SEC
BH CV ECHO MEAS - PA V2 MEAN: 82.1 CM/SEC
BH CV ECHO MEAS - PA V2 VTI: 31.4 CM
BH CV ECHO MEAS - RAP SYSTOLE: 10 MMHG
BH CV ECHO MEAS - RV MAX PG: 4.7 MMHG
BH CV ECHO MEAS - RV MEAN PG: 2 MMHG
BH CV ECHO MEAS - RV V1 MAX: 108 CM/SEC
BH CV ECHO MEAS - RV V1 MEAN: 70.5 CM/SEC
BH CV ECHO MEAS - RV V1 VTI: 25.1 CM
BH CV ECHO MEAS - RVDD: 3 CM
BH CV ECHO MEAS - RVSP: 17 MMHG
BH CV ECHO MEAS - SI(AO): 103.2 ML/M^2
BH CV ECHO MEAS - SI(CUBED): 43.1 ML/M^2
BH CV ECHO MEAS - SI(TEICH): 36.9 ML/M^2
BH CV ECHO MEAS - SV(AO): 209.9 ML
BH CV ECHO MEAS - SV(CUBED): 87.6 ML
BH CV ECHO MEAS - SV(TEICH): 75 ML
BH CV ECHO MEAS - TAPSE (>1.6): 2.7 CM2
BH CV ECHO MEAS - TR MAX VEL: 131 CM/SEC
BH CV ECHO MEASUREMENTS AVERAGE E/E' RATIO: 13.58
BH CV XLRA - RV BASE: 2 CM
BH CV XLRA - RV LENGTH: 7 CM
BH CV XLRA - RV MID: 2.1 CM
MAXIMAL PREDICTED HEART RATE: 168 BPM
STRESS TARGET HR: 143 BPM

## 2020-04-06 RX ORDER — FUROSEMIDE 20 MG/1
20 TABLET ORAL 2 TIMES DAILY
Qty: 180 TABLET | Refills: 3 | Status: SHIPPED | OUTPATIENT
Start: 2020-04-06 | End: 2021-12-09 | Stop reason: SDUPTHER

## 2020-04-06 NOTE — TELEPHONE ENCOUNTER
Spoke with Ms. Tabares in regards to her echo findings.  She was found to have diastolic dysfunction stage II she is already on Lasix 20 mg daily with additional doses on days of increased swelling or weight gain more than 3 to 5 pounds.  I will refill her prescription for her 20 mg twice daily to help with the diastolic dysfunction.  Patient states that when she does take it twice daily she does notice a significant reduction in her leg swelling.  Denies any other complaints or questions at this time.

## 2020-04-10 ENCOUNTER — RESULTS ENCOUNTER (OUTPATIENT)
Dept: CARDIOLOGY | Facility: CLINIC | Age: 55
End: 2020-04-10

## 2020-04-10 DIAGNOSIS — I82.409 ACUTE DEEP VEIN THROMBOSIS (DVT) OF LOWER EXTREMITY, UNSPECIFIED LATERALITY, UNSPECIFIED VEIN (HCC): ICD-10-CM

## 2020-04-17 ENCOUNTER — RESULTS ENCOUNTER (OUTPATIENT)
Dept: CARDIOLOGY | Facility: CLINIC | Age: 55
End: 2020-04-17

## 2020-04-17 DIAGNOSIS — I82.409 ACUTE DEEP VEIN THROMBOSIS (DVT) OF LOWER EXTREMITY, UNSPECIFIED LATERALITY, UNSPECIFIED VEIN (HCC): ICD-10-CM

## 2020-04-22 ENCOUNTER — ANTICOAGULATION VISIT (OUTPATIENT)
Dept: CARDIOLOGY | Facility: CLINIC | Age: 55
End: 2020-04-22

## 2020-04-22 DIAGNOSIS — I82.409 ACUTE DEEP VEIN THROMBOSIS (DVT) OF LOWER EXTREMITY, UNSPECIFIED LATERALITY, UNSPECIFIED VEIN (HCC): Primary | ICD-10-CM

## 2020-04-22 LAB — INR PPP: 3.7

## 2020-04-22 NOTE — PATIENT INSTRUCTIONS
Called patient and notified her to HOLD Coumadin x24 hours then resume normal dosing. Re-check in 2 weeks. Patient verbalized understanding and had no further questions at this time. -JUNIOR;NILES

## 2020-04-22 NOTE — PROGRESS NOTES
Current PT/INR results received and reviewed in office by Ricardo Rhodes PA-C. Verbal orders given to HOLD x24 hours, resume dose and re-check in 2 weeks. -JUNIOR;NILES

## 2020-04-24 ENCOUNTER — RESULTS ENCOUNTER (OUTPATIENT)
Dept: CARDIOLOGY | Facility: CLINIC | Age: 55
End: 2020-04-24

## 2020-04-24 DIAGNOSIS — I82.409 ACUTE DEEP VEIN THROMBOSIS (DVT) OF LOWER EXTREMITY, UNSPECIFIED LATERALITY, UNSPECIFIED VEIN (HCC): ICD-10-CM

## 2020-05-01 ENCOUNTER — RESULTS ENCOUNTER (OUTPATIENT)
Dept: CARDIOLOGY | Facility: CLINIC | Age: 55
End: 2020-05-01

## 2020-05-01 DIAGNOSIS — I82.409 ACUTE DEEP VEIN THROMBOSIS (DVT) OF LOWER EXTREMITY, UNSPECIFIED LATERALITY, UNSPECIFIED VEIN (HCC): ICD-10-CM

## 2020-05-08 ENCOUNTER — RESULTS ENCOUNTER (OUTPATIENT)
Dept: CARDIOLOGY | Facility: CLINIC | Age: 55
End: 2020-05-08

## 2020-05-08 DIAGNOSIS — I82.409 ACUTE DEEP VEIN THROMBOSIS (DVT) OF LOWER EXTREMITY, UNSPECIFIED LATERALITY, UNSPECIFIED VEIN (HCC): ICD-10-CM

## 2020-05-15 ENCOUNTER — RESULTS ENCOUNTER (OUTPATIENT)
Dept: CARDIOLOGY | Facility: CLINIC | Age: 55
End: 2020-05-15

## 2020-05-15 DIAGNOSIS — I82.409 ACUTE DEEP VEIN THROMBOSIS (DVT) OF LOWER EXTREMITY, UNSPECIFIED LATERALITY, UNSPECIFIED VEIN (HCC): ICD-10-CM

## 2020-05-22 ENCOUNTER — RESULTS ENCOUNTER (OUTPATIENT)
Dept: CARDIOLOGY | Facility: CLINIC | Age: 55
End: 2020-05-22

## 2020-05-22 ENCOUNTER — ANTICOAGULATION VISIT (OUTPATIENT)
Dept: CARDIOLOGY | Facility: CLINIC | Age: 55
End: 2020-05-22

## 2020-05-22 DIAGNOSIS — I82.409 ACUTE DEEP VEIN THROMBOSIS (DVT) OF LOWER EXTREMITY, UNSPECIFIED LATERALITY, UNSPECIFIED VEIN (HCC): Primary | ICD-10-CM

## 2020-05-22 DIAGNOSIS — I82.409 ACUTE DEEP VEIN THROMBOSIS (DVT) OF LOWER EXTREMITY, UNSPECIFIED LATERALITY, UNSPECIFIED VEIN (HCC): ICD-10-CM

## 2020-05-22 LAB — INR PPP: 2.38

## 2020-05-22 NOTE — PROGRESS NOTES
Current PT/INR results received and reviewed by Ricardo Rhodes PA-C. Verbal orders given to cont current medication regimen and re-check in 3 weeks. -JUNIOR;NILES

## 2020-05-22 NOTE — PATIENT INSTRUCTIONS
Called patient and notified her to cont current medication regimen and re-check in 3 weeks. Patient verbalized understanding and had no further questions at this time. -JUNIOR;NILES   Contact Numbers  [facility]: [phone]        Please call the [facility] 

## 2020-05-29 ENCOUNTER — RESULTS ENCOUNTER (OUTPATIENT)
Dept: CARDIOLOGY | Facility: CLINIC | Age: 55
End: 2020-05-29

## 2020-05-29 DIAGNOSIS — I82.409 ACUTE DEEP VEIN THROMBOSIS (DVT) OF LOWER EXTREMITY, UNSPECIFIED LATERALITY, UNSPECIFIED VEIN (HCC): ICD-10-CM

## 2020-06-05 ENCOUNTER — RESULTS ENCOUNTER (OUTPATIENT)
Dept: CARDIOLOGY | Facility: CLINIC | Age: 55
End: 2020-06-05

## 2020-06-05 DIAGNOSIS — I82.409 ACUTE DEEP VEIN THROMBOSIS (DVT) OF LOWER EXTREMITY, UNSPECIFIED LATERALITY, UNSPECIFIED VEIN (HCC): ICD-10-CM

## 2020-06-09 ENCOUNTER — OFFICE VISIT (OUTPATIENT)
Dept: CARDIOLOGY | Facility: CLINIC | Age: 55
End: 2020-06-09

## 2020-06-09 VITALS
HEART RATE: 76 BPM | OXYGEN SATURATION: 97 % | DIASTOLIC BLOOD PRESSURE: 62 MMHG | TEMPERATURE: 97.5 F | BODY MASS INDEX: 34.07 KG/M2 | WEIGHT: 212 LBS | SYSTOLIC BLOOD PRESSURE: 135 MMHG | HEIGHT: 66 IN

## 2020-06-09 DIAGNOSIS — I73.9 PVD (PERIPHERAL VASCULAR DISEASE) (HCC): ICD-10-CM

## 2020-06-09 DIAGNOSIS — I10 ESSENTIAL HYPERTENSION: ICD-10-CM

## 2020-06-09 DIAGNOSIS — R60.0 BILATERAL LOWER EXTREMITY EDEMA: Primary | ICD-10-CM

## 2020-06-09 PROCEDURE — 99213 OFFICE O/P EST LOW 20 MIN: CPT | Performed by: NURSE PRACTITIONER

## 2020-06-09 NOTE — PATIENT INSTRUCTIONS
Steps to Quit Smoking  Smoking tobacco is the leading cause of preventable death. It can affect almost every organ in the body. Smoking puts you and people around you at risk for many serious, long-lasting (chronic) diseases. Quitting smoking can be hard, but it is one of the best things that you can do for your health. It is never too late to quit.  How do I get ready to quit?  When you decide to quit smoking, make a plan to help you succeed. Before you quit:  · Pick a date to quit. Set a date within the next 2 weeks to give you time to prepare.  · Write down the reasons why you are quitting. Keep this list in places where you will see it often.  · Tell your family, friends, and co-workers that you are quitting. Their support is important.  · Talk with your doctor about the choices that may help you quit.  · Find out if your health insurance will pay for these treatments.  · Know the people, places, things, and activities that make you want to smoke (triggers). Avoid them.  What first steps can I take to quit smoking?  · Throw away all cigarettes at home, at work, and in your car.  · Throw away the things that you use when you smoke, such as ashtrays and lighters.  · Clean your car. Make sure to empty the ashtray.  · Clean your home, including curtains and carpets.  What can I do to help me quit smoking?  Talk with your doctor about taking medicines and seeing a counselor at the same time. You are more likely to succeed when you do both.  · If you are pregnant or breastfeeding, talk with your doctor about counseling or other ways to quit smoking. Do not take medicine to help you quit smoking unless your doctor tells you to do so.  To quit smoking:  Quit right away  · Quit smoking totally, instead of slowly cutting back on how much you smoke over a period of time.  · Go to counseling. You are more likely to quit if you go to counseling sessions regularly.  Take medicine  You may take medicines to help you quit. Some  medicines need a prescription, and some you can buy over-the-counter. Some medicines may contain a drug called nicotine to replace the nicotine in cigarettes. Medicines may:  · Help you to stop having the desire to smoke (cravings).  · Help to stop the problems that come when you stop smoking (withdrawal symptoms).  Your doctor may ask you to use:  · Nicotine patches, gum, or lozenges.  · Nicotine inhalers or sprays.  · Non-nicotine medicine that is taken by mouth.  Find resources  Find resources and other ways to help you quit smoking and remain smoke-free after you quit. These resources are most helpful when you use them often. They include:  · Online chats with a counselor.  · Phone quitlines.  · Printed self-help materials.  · Support groups or group counseling.  · Text messaging programs.  · Mobile phone apps. Use apps on your mobile phone or tablet that can help you stick to your quit plan. There are many free apps for mobile phones and tablets as well as websites. Examples include Quit Guide from the CDC and smokefree.gov    What things can I do to make it easier to quit?    · Talk to your family and friends. Ask them to support and encourage you.  · Call a phone quitline (0-126-QUITNOW), reach out to support groups, or work with a counselor.  · Ask people who smoke to not smoke around you.  · Avoid places that make you want to smoke, such as:  ? Bars.  ? Parties.  ? Smoke-break areas at work.  · Spend time with people who do not smoke.  · Lower the stress in your life. Stress can make you want to smoke. Try these things to help your stress:  ? Getting regular exercise.  ? Doing deep-breathing exercises.  ? Doing yoga.  ? Meditating.  ? Doing a body scan. To do this, close your eyes, focus on one area of your body at a time from head to toe. Notice which parts of your body are tense. Try to relax the muscles in those areas.  How will I feel when I quit smoking?  Day 1 to 3 weeks  Within the first 24 hours,  you may start to have some problems that come from quitting tobacco. These problems are very bad 2-3 days after you quit, but they do not often last for more than 2-3 weeks. You may get these symptoms:  · Mood swings.  · Feeling restless, nervous, angry, or annoyed.  · Trouble concentrating.  · Dizziness.  · Strong desire for high-sugar foods and nicotine.  · Weight gain.  · Trouble pooping (constipation).  · Feeling like you may vomit (nausea).  · Coughing or a sore throat.  · Changes in how the medicines that you take for other issues work in your body.  · Depression.  · Trouble sleeping (insomnia).  Week 3 and afterward  After the first 2-3 weeks of quitting, you may start to notice more positive results, such as:  · Better sense of smell and taste.  · Less coughing and sore throat.  · Slower heart rate.  · Lower blood pressure.  · Clearer skin.  · Better breathing.  · Fewer sick days.  Quitting smoking can be hard. Do not give up if you fail the first time. Some people need to try a few times before they succeed. Do your best to stick to your quit plan, and talk with your doctor if you have any questions or concerns.  Summary  · Smoking tobacco is the leading cause of preventable death. Quitting smoking can be hard, but it is one of the best things that you can do for your health.  · When you decide to quit smoking, make a plan to help you succeed.  · Quit smoking right away, not slowly over a period of time.  · When you start quitting, seek help from your doctor, family, or friends.  This information is not intended to replace advice given to you by your health care provider. Make sure you discuss any questions you have with your health care provider.  Document Released: 10/14/2010 Document Revised: 03/06/2020 Document Reviewed: 03/07/2020  Elsevier Patient Education © 2020 [employer]  BMI for Adults    Body mass index (BMI) is a number that is calculated from a person's weight and height. BMI may help to  "estimate how much of a person's weight is composed of fat. BMI can help identify those who may be at higher risk for certain medical problems.  How is BMI used with adults?  BMI is used as a screening tool to identify possible weight problems. It is used to check whether a person is obese, overweight, healthy weight, or underweight.  How is BMI calculated?  BMI measures your weight and compares it to your height. This can be done either in English ([place] or metric measurements. Note that charts are available to help you find your BMI quickly and easily without having to do these calculations yourself.  To calculate your BMI in English ([place] measurements, your health care provider will:  1. Measure your weight in pounds (lb).  2. Multiply the number of pounds by 703.  ? For example, for a person who weighs 180 lb, multiply that number by 703, which equals 126,540.  3. Measure your height in inches (in). Then multiply that number by itself to get a measurement called \"inches squared.\"  ? For example, for a person who is 70 in tall, the \"inches squared\" measurement is 70 in x 70 in, which equals 4900 inches squared.  4. Divide the total from Step 2 (number of lb x 703) by the total from Step 3 (inches squared): 126,540 ÷ 4900 = 25.8. This is your BMI.  To calculate your BMI in metric measurements, your health care provider will:  1. Measure your weight in kilograms (kg).  2. Measure your height in meters (m). Then multiply that number by itself to get a measurement called \"meters squared.\"  ? For example, for a person who is 1.75 m tall, the \"meters squared\" measurement is 1.75 m x 1.75 m, which is equal to 3.1 meters squared.  3. Divide the number of kilograms (your weight) by the meters squared number. In this example: 70 ÷ 3.1 = 22.6. This is your BMI.  How is BMI interpreted?  To interpret your results, your health care provider will use BMI charts to identify whether you are underweight, normal weight, " overweight, or obese. The following guidelines will be used:  · Underweight: BMI less than 18.5.  · Normal weight: BMI between 18.5 and 24.9.  · Overweight: BMI between 25 and 29.9.  · Obese: BMI of 30 and above.  Please note:  · Weight includes both fat and muscle, so someone with a muscular build, such as an athlete, may have a BMI that is higher than 24.9. In cases like these, BMI is not an accurate measure of body fat.  · To determine if excess body fat is the cause of a BMI of 25 or higher, further assessments may need to be done by a health care provider.  · BMI is usually interpreted in the same way for men and women.  Why is BMI a useful tool?  BMI is useful in two ways:  · Identifying a weight problem that may be related to a medical condition, or that may increase the risk for medical problems.  · Promoting lifestyle and diet changes in order to reach a healthy weight.  Summary  · Body mass index (BMI) is a number that is calculated from a person's weight and height.  · BMI may help to estimate how much of a person's weight is composed of fat. BMI can help identify those who may be at higher risk for certain medical problems.  · BMI can be measured using English measurements or metric measurements.  · To interpret your results, your health care provider will use BMI charts to identify whether you are underweight, normal weight, overweight, or obese.  This information is not intended to replace advice given to you by your health care provider. Make sure you discuss any questions you have with your health care provider.  Document Released: 08/29/2005 Document Revised: 11/30/2018 Document Reviewed: 10/31/2018  [employer] Patient Education © 2020 [employer]    Acute Coronary Syndrome  Acute coronary syndrome (ACS) is a serious problem in which there is suddenly not enough blood and oxygen reaching the heart. ACS can result in chest pain or a heart attack.  This condition is a medical emergency. If you have  any symptoms of this condition, get help right away.  What are the causes?  This condition may be caused by:  · A buildup of fat and cholesterol inside the arteries (atherosclerosis). This is the most common cause. The buildup (plaque) can cause blood vessels in the heart (coronary arteries) to become narrow or blocked, which reduces blood flow to the heart. Plaque can also break off and lead to a clot, which can block an artery and cause a heart attack or stroke.  · Sudden tightening of the muscles around the coronary arteries (coronary spasm).  · Tearing of a coronary artery (spontaneous coronary artery dissection).  · Very low blood pressure (hypotension).  · An abnormal heartbeat (arrhythmia).  · Other medical conditions that cause a decrease of oxygen to the heart, such as anemiaorrespiratory failure.  · Using cocaine or methamphetamine.  What increases the risk?  The following factors may make you more likely to develop this condition:  · Age. The risk for ACS increases as you get older.  · History of chest pain, heart attack, peripheral artery disease, or stroke.  · Having taken chemotherapy or immune-suppressing medicines.  · Being male.  · Family history of chest pain, heart disease, or stroke.  · Smoking.  · Not exercising enough.  · Being overweight.  · High cholesterol.  · High blood pressure (hypertension).  · Diabetes.  · Excessive alcohol use.  What are the signs or symptoms?  Common symptoms of this condition include:  · Chest pain. The pain may last a long time, or it may stop and come back (recur). It may feel like:  ? Crushing or squeezing.  ? Tightness, pressure, fullness, or heaviness.  · Arm, neck, jaw, or back pain.  · Heartburn or indigestion.  · Shortness of breath.  · Nausea.  · Sudden cold sweats.  · Light-headedness.  · Dizziness or passing out.  · Tiredness (fatigue).  Sometimes there are no symptoms.  How is this diagnosed?  This condition may be diagnosed based on:  · Your medical  history and symptoms.  · Imaging tests, such as:  ? An electrocardiogram (ECG). This measures the heart's electrical activity.  ? X-rays.  ? CT scan.  ? A coronary angiogram. For this test, dye is injected into the heart arteries and then X-rays are taken.  ? Myocardial perfusion imaging. This test shows how well blood flows through your heart muscle.  · Blood tests. These may be repeated at certain time intervals.  · Exercise stress testing.  · Echocardiogram. This is a test that uses sound waves to produce detailed images of the heart.  How is this treated?  Treatment for this condition may include:  · Oxygen therapy.  · Medicines, such as:  ? Antiplatelet medicines and blood-thinning medicines, such as aspirin. These help prevent blood clots.  ? Medicine that dissolves any blood clots (fibrinolytic therapy).  ? Blood pressure medicines.  ? Nitroglycerin. This helps widen blood vessels to improve blood flow.  ? Pain medicine.  ? Cholesterol-lowering medicine.  · Surgery, such as:  ? Coronary angioplasty with stent placement. This involves placing a small piece of metal that looks like mesh or a spring into a narrow coronary artery. This widens the artery and keeps it open.  ? Coronary artery bypass surgery. This involves taking a section of a blood vessel from a different part of your body and placing it on the blocked coronary artery to allow blood to flow around the blockage.  · Cardiac rehabilitation. This is a program that includes exercise training, education, and counseling to help you recover.  Follow these instructions at home:  Eating and drinking  · Eat a heart-healthy diet that includes whole grains, fruits and vegetables, lean proteins, and low-fat or nonfat dairy products.  · Limit how much salt (sodium) you eat as told by your health care provider. Follow instructions from your health care provider about any other eating or drinking restrictions, such as limiting foods that are high in fat and  processed sugars.  · Use healthy cooking methods such as roasting, grilling, broiling, baking, poaching, steaming, or stir-frying.  · Work with a dietitian to follow a heart-healthy eating plan.  Medicines  · Take over-the-counter and prescription medicines only as told by your health care provider.  · Do not take these medicines unless your health care provider approves:  ? Vitamin supplements that contain vitamin A or vitamin E.  ? NSAIDs, such as ibuprofen, naproxen, or celecoxib.  ? Hormone replacement therapy that contains estrogen.  · If you are taking blood thinners:  ? Talk with your health care provider before you take any medicines that contain aspirin or NSAIDs. These medicines increase your risk for dangerous bleeding.  ? Take your medicine exactly as told, at the same time every day.  ? Avoid activities that could cause injury or bruising, and follow instructions about how to prevent falls.  ? Wear a medical alert bracelet, and carry a card that lists what medicines you take.  Activity  · Follow your cardiac rehabilitation program. Do exercises as told by your physical therapist.  · Ask your health care provider what activities and exercises are safe for you. Follow his or her instructions about lifting, driving, or climbing stairs.  Lifestyle  · Do not use any products that contain nicotine or tobacco, such as cigarettes, e-cigarettes, and chewing tobacco. If you need help quitting, ask your health care provider.  · Do not drink alcohol if your health care provider tells you not to drink.  · If you drink alcohol:  ? Limit how much you have to 0-1 drink a day.  ? Be aware of how much alcohol is in your drink. In the [place] one drink equals one 12 oz bottle of beer (355 mL), one 5 oz glass of wine (148 mL), or one 1½ oz glass of hard liquor (44 mL).  · Maintain a healthy weight. If you need to lose weight, work with your health care provider to do so safely.  General instructions  · Tell all the health  care providers who provide care for you about your heart condition, including your dentist. This may affect the medicines or treatment you receive.  · Manage any other health conditions you have, such as hypertension or diabetes. These conditions affect your heart.  · Pay attention to your mental health. You may be at higher risk for depression.  ? Find ways to manage stress.  ? Talk to your health care provider about depression screening and treatment.  · Keep your vaccinations up to date.  ? Get the flu shot (influenza vaccine) every year.  ? Get the pneumococcal vaccine if you are age 65 or older.  · If directed, monitor your blood pressure at home.  · Keep all follow-up visits as told by your health care provider. This is important.  Contact a health care provider if you:  · Feel overwhelmed or sad.  · Have trouble doing your daily activities.  Get help right away if you:  · Have pain in your chest, neck, arm, jaw, stomach, or back that recurs, and:  ? It lasts for more than a few minutes.  ? It is not relieved by taking the medicineyour health care provider prescribed.  · Have unexplained:  ? Heavy sweating.  ? Heartburn or indigestion.  ? Nausea or vomiting.  ? Shortness of breath.  ? Difficulty breathing.  ? Fatigue.  ? Nervousness or anxiety.  ? Weakness.  ? Diarrhea.  ? Dark stools or blood in your stool.  · Have sudden light-headedness or dizziness.  · Have blood pressure that is higher than 180/120.  · Faint.  · Have thoughts about hurting yourself.  These symptoms may represent a serious problem that is an emergency. Do not wait to see if the symptoms will go away. Get medical help right away. Call your local emergency services (911 in the [place] Do not drive yourself to the hospital.   Summary  · Acute coronary syndrome (ACS) is when there is not enough blood and oxygen being supplied to the heart. ACS can result in chest pain or a heart attack.  · Acute coronary syndrome is a medical emergency. If you  have any symptoms of this condition, get help right away.  · Treatment includes medicines and procedures to open the blocked arteries and restore blood flow.  This information is not intended to replace advice given to you by your health care provider. Make sure you discuss any questions you have with your health care provider.  Document Released: 12/18/2006 Document Revised: 12/30/2019 Document Reviewed: 12/30/2019  [employer] Patient Education © 2020 [employer]

## 2020-06-09 NOTE — PROGRESS NOTES
Subjective     Vannessa Contreras is a 54 y.o. female who presents to day for Hypertension.    CHIEF COMPLIANT  Chief Complaint   Patient presents with   • Hypertension       Active Problems:  Problem List Items Addressed This Visit        Cardiovascular and Mediastinum    Hypertension    PVD (peripheral vascular disease) (CMS/[facility])    Overview     5.1 Bilateral aortobifemoral bypass 6/2007; thrombectomy rt aortofemoral graft 11/2009             Other Visit Diagnoses     Bilateral lower extremity edema    -  Primary          HPI  HPI  Ms. Tabares is a 54-year-old female who is being followed up today for peripheral vascular disease who is underwent recent carotid Doppler which was negative for any significant carotid artery disease.  Patient verbalized that she has done well from the cardiovascular standpoint and denies any angina or anginal equivalent symptoms.  She does report that her lower extremity edema has switched from her right leg to her left leg.  On examination there are relatively comparable with the left leg being a small amount larger.  She denies any pain.  She denies chest pain, shortness of breath, palpitations, syncope, PND, orthopnea, or other neurological changes.  PRIOR MEDS  Current Outpatient Medications on File Prior to Visit   Medication Sig Dispense Refill   • aspirin (ASPIRIN LOW DOSE) 81 MG EC tablet Take 81 mg by mouth 2 (Two) Times a Day.     • atorvastatin (LIPITOR) 40 MG tablet TAKE 1 TABLET BY MOUTH EVERY NIGHT AT BEDTIME 30 tablet 0   • cetirizine (zyrTEC) 10 MG tablet Take 10 mg by mouth Daily.     • furosemide (LASIX) 20 MG tablet Take 1 tablet by mouth 2 (Two) Times a Day. 180 tablet 3   • losartan (COZAAR) 100 MG tablet Take 100 mg by mouth Daily.     • meloxicam (MOBIC) 15 MG tablet Take 15 mg by mouth Daily.     • metoprolol tartrate (LOPRESSOR) 50 MG tablet Take 50 mg by mouth 2 (Two) Times a Day.  12   • Multiple Vitamins-Minerals (MULTIVITAMIN ADULT PO) Take  by mouth Daily.      • nitroglycerin (NITROSTAT) 0.4 MG SL tablet Place 0.4 mg under the tongue Every 5 (Five) Minutes As Needed.     • Omega-3 Fatty Acids (EQL FISH OIL) 1200 MG capsule Take 1 tablet by mouth 2 (Two) Times a Day.     • potassium chloride (K-DUR) 10 MEQ CR tablet Take 1 tablet by mouth Daily. 30 tablet 11   • warfarin (COUMADIN) 4 MG tablet Take 4 mg by mouth. Takes 6 mg M/W/Sat and 4 mg all other days     • warfarin (COUMADIN) 6 MG tablet TAKE 1 TABLET BY MOUTH EVERY WEDNESDAY (Patient taking differently: Take 6 mg by mouth. M, W and Sat) 30 tablet 5     No current facility-administered medications on file prior to visit.        ALLERGIES  Patient has no known allergies.    HISTORY  Past Medical History:   Diagnosis Date   • Arterial insufficiency (CMS/HCC)    • Atherosclerosis of arteries of extremities (CMS/[facility])    • CAD (coronary artery disease)    • DVT (deep venous thrombosis) (CMS/[facility])     h.o - on coumadin    • Fatigue    • Hyperlipidemia    • Hypertension    • Palpitations    • PVD (peripheral vascular disease) (CMS/[facility])    • SOB (shortness of breath)        Social History     Socioeconomic History   • Marital status:      Spouse name: Not on file   • Number of children: Not on file   • Years of education: Not on file   • Highest education level: Not on file   Tobacco Use   • Smoking status: Current Every Day Smoker     Packs/day: 1.00     Types: Cigarettes   • Smokeless tobacco: Never Used   Substance and Sexual Activity   • Alcohol use: Never     Frequency: Never     Comment: Social    • Drug use: Never   • Sexual activity: Defer       Family History   Problem Relation Age of Onset   • Atrial fibrillation Mother    • Heart attack Father    • Diabetes Sister    • Hypertension Sister    • Stroke Maternal Grandmother    • Peripheral vascular disease Other        Review of Systems   Constitutional: Negative.  Negative for chills, fatigue and fever.   HENT: Negative.  Negative for congestion, sinus  "pressure and sore throat.    Eyes: Negative.  Negative for visual disturbance.   Respiratory: Negative.  Negative for chest tightness and shortness of breath.    Cardiovascular: Positive for leg swelling. Negative for chest pain and palpitations.   Gastrointestinal: Negative.  Negative for abdominal pain, blood in stool, constipation, diarrhea, nausea and vomiting.   Endocrine: Negative.  Negative for cold intolerance and heat intolerance.   Genitourinary: Negative.  Negative for dysuria, frequency, hematuria and urgency.   Musculoskeletal: Positive for arthralgias. Negative for back pain and neck pain.   Skin: Negative.  Negative for rash and wound.   Allergic/Immunologic: Negative.  Negative for environmental allergies and food allergies.   Neurological: Negative.  Negative for dizziness, syncope and light-headedness.   Hematological: Negative.  Does not bruise/bleed easily.   Psychiatric/Behavioral: Negative.  Negative for sleep disturbance (denies waking with soa or cp).       Objective     VITALS: /62 (BP Location: Left arm, Patient Position: Sitting)   Pulse 76   Temp 97.5 °F (36.4 °C)   Ht 167.6 cm (66\")   Wt 96.2 kg (212 lb)   SpO2 97%   BMI 34.22 kg/m²     LABS:   Lab Results (most recent)     None          IMAGING:   No Images in the past 120 days found..    EXAM:  Physical Exam   Constitutional: She is oriented to person, place, and time. She appears well-developed and well-nourished.   HENT:   Head: Normocephalic and atraumatic.   Eyes: Pupils are equal, round, and reactive to light. EOM are normal.   Neck: Trachea normal and phonation normal. Neck supple. No JVD present. Carotid bruit is not present. No thyroid mass present.   Cardiovascular: Normal rate, regular rhythm, normal heart sounds and intact distal pulses. Exam reveals no gallop and no friction rub.   No murmur heard.  Pulses:       Radial pulses are 2+ on the right side, and 2+ on the left side.        Posterior tibial pulses are " 2+ on the right side, and 2+ on the left side.   Pulmonary/Chest: Effort normal and breath sounds normal. No respiratory distress. She has no wheezes. She has no rales.   Abdominal: Soft. Bowel sounds are normal. She exhibits no distension and no abdominal bruit. There is no tenderness.   Musculoskeletal: Normal range of motion. She exhibits edema (2+ BLE).   Neurological: She is alert and oriented to person, place, and time. She has normal strength. No cranial nerve deficit or sensory deficit.   Skin: Skin is warm, dry and intact. Capillary refill takes less than 2 seconds. No rash noted.   Psychiatric: She has a normal mood and affect. Her speech is normal and behavior is normal. Judgment and thought content normal. Cognition and memory are normal.   Nursing note and vitals reviewed.      Procedure   Procedures       Assessment/Plan    Diagnosis Plan   1. Bilateral lower extremity edema     2. Essential hypertension     3. PVD (peripheral vascular disease) (CMS/[facility])     1.  Patient does have bilateral lower extremity edema that was worse on the right now is seems to appears slightly worse on the left.  Patient states that that switches back and forth all the time and she is always have some level of edema.  We will continue current medication regimen at this time.  Patient recently had a negative arterial duplex of her bilateral lower extremities.  2.  Patient's blood pressure is well controlled on current blood pressure medication regimen.  No medication changes are warranted at this time.  Patient advised to monitor blood pressure on a daily basis and report any persistent highs or lows.  Set goal blood pressure for patient at 130/80 or below.  3.  Patient does have a significant history of peripheral vascular disease with aortobifem with thrombus.  She has been on Coumadin in which her INR is been running between 2 and 3 and is doing well and has no signs of bleeding.  4.  Informed of signs and symptoms of ACS  and advised to seek emergent treatment for any new worsening symptoms.  Patient also advised sooner follow-up as needed.  Also advised to follow-up with family doctor as needed  This note is dictated utilizing voice recognition software.  Although this record has been proof read, transcriptional errors may still be present. If questions occur regarding the content of this record please do not hesitate to call our office.      Return in about 6 months (around 12/9/2020), or if symptoms worsen or fail to improve.    Vannessa was seen today for hypertension.    Diagnoses and all orders for this visit:    Bilateral lower extremity edema    Essential hypertension    PVD (peripheral vascular disease) (CMS/[facility])        Vannessa Contreras  reports that she has been smoking cigarettes. She has been smoking about 1.00 pack per day. She has never used smokeless tobacco.. I have educated her on the risk of diseases from using tobacco products such as cancer, COPD and heart diease.     I advised her to quit and she is not willing to quit.    I spent 3  minutes counseling the patient.           Patient's Body mass index is 34.22 kg/m². BMI is above normal parameters. Recommendations include: educational material.           MEDS ORDERED DURING VISIT:  No orders of the defined types were placed in this encounter.          This document has been electronically signed by Rush Cruz Jr., APRN  Yessica 15, 2020 01:33

## 2020-06-12 ENCOUNTER — RESULTS ENCOUNTER (OUTPATIENT)
Dept: CARDIOLOGY | Facility: CLINIC | Age: 55
End: 2020-06-12

## 2020-06-12 DIAGNOSIS — I82.409 ACUTE DEEP VEIN THROMBOSIS (DVT) OF LOWER EXTREMITY, UNSPECIFIED LATERALITY, UNSPECIFIED VEIN (HCC): ICD-10-CM

## 2020-06-15 PROBLEM — E78.5 DYSLIPIDEMIA: Status: ACTIVE | Noted: 2020-06-15

## 2020-06-15 PROBLEM — I82.90 THROMBOTIC DISORDER: Status: ACTIVE | Noted: 2020-06-15

## 2020-06-19 ENCOUNTER — ANTICOAGULATION VISIT (OUTPATIENT)
Dept: CARDIOLOGY | Facility: CLINIC | Age: 55
End: 2020-06-19

## 2020-06-19 ENCOUNTER — RESULTS ENCOUNTER (OUTPATIENT)
Dept: CARDIOLOGY | Facility: CLINIC | Age: 55
End: 2020-06-19

## 2020-06-19 DIAGNOSIS — I82.409 ACUTE DEEP VEIN THROMBOSIS (DVT) OF LOWER EXTREMITY, UNSPECIFIED LATERALITY, UNSPECIFIED VEIN (HCC): ICD-10-CM

## 2020-06-19 LAB — INR PPP: 3.2 (ref 2–3)

## 2020-06-19 NOTE — PROGRESS NOTES
PER ABIOLA CLINE, NP ONLY TAKE 2 MG TODAY THEN RESUME PREVIOUS DOSING AND RECHECK LEVEL IN 2-3 WEEKS. VERBAL ORDERS READ BACK AND VERIFIED BY ABIOLA CLINE NP. PATIENT AWARE, VERBALIZED OK. PH,LPN

## 2020-06-26 ENCOUNTER — RESULTS ENCOUNTER (OUTPATIENT)
Dept: CARDIOLOGY | Facility: CLINIC | Age: 55
End: 2020-06-26

## 2020-06-26 DIAGNOSIS — I82.409 ACUTE DEEP VEIN THROMBOSIS (DVT) OF LOWER EXTREMITY, UNSPECIFIED LATERALITY, UNSPECIFIED VEIN (HCC): ICD-10-CM

## 2020-07-03 ENCOUNTER — RESULTS ENCOUNTER (OUTPATIENT)
Dept: CARDIOLOGY | Facility: CLINIC | Age: 55
End: 2020-07-03

## 2020-07-03 DIAGNOSIS — I82.409 ACUTE DEEP VEIN THROMBOSIS (DVT) OF LOWER EXTREMITY, UNSPECIFIED LATERALITY, UNSPECIFIED VEIN (HCC): ICD-10-CM

## 2020-07-09 ENCOUNTER — TELEPHONE (OUTPATIENT)
Dept: CARDIOLOGY | Facility: CLINIC | Age: 55
End: 2020-07-09

## 2020-07-10 ENCOUNTER — RESULTS ENCOUNTER (OUTPATIENT)
Dept: CARDIOLOGY | Facility: CLINIC | Age: 55
End: 2020-07-10

## 2020-07-10 DIAGNOSIS — I82.409 ACUTE DEEP VEIN THROMBOSIS (DVT) OF LOWER EXTREMITY, UNSPECIFIED LATERALITY, UNSPECIFIED VEIN (HCC): ICD-10-CM

## 2020-07-17 ENCOUNTER — RESULTS ENCOUNTER (OUTPATIENT)
Dept: CARDIOLOGY | Facility: CLINIC | Age: 55
End: 2020-07-17

## 2020-07-17 DIAGNOSIS — I82.409 ACUTE DEEP VEIN THROMBOSIS (DVT) OF LOWER EXTREMITY, UNSPECIFIED LATERALITY, UNSPECIFIED VEIN (HCC): ICD-10-CM

## 2020-07-24 ENCOUNTER — RESULTS ENCOUNTER (OUTPATIENT)
Dept: CARDIOLOGY | Facility: CLINIC | Age: 55
End: 2020-07-24

## 2020-07-24 DIAGNOSIS — I82.409 ACUTE DEEP VEIN THROMBOSIS (DVT) OF LOWER EXTREMITY, UNSPECIFIED LATERALITY, UNSPECIFIED VEIN (HCC): ICD-10-CM

## 2020-07-31 ENCOUNTER — RESULTS ENCOUNTER (OUTPATIENT)
Dept: CARDIOLOGY | Facility: CLINIC | Age: 55
End: 2020-07-31

## 2020-07-31 DIAGNOSIS — I82.409 ACUTE DEEP VEIN THROMBOSIS (DVT) OF LOWER EXTREMITY, UNSPECIFIED LATERALITY, UNSPECIFIED VEIN (HCC): ICD-10-CM

## 2020-08-07 ENCOUNTER — RESULTS ENCOUNTER (OUTPATIENT)
Dept: CARDIOLOGY | Facility: CLINIC | Age: 55
End: 2020-08-07

## 2020-08-07 DIAGNOSIS — I82.409 ACUTE DEEP VEIN THROMBOSIS (DVT) OF LOWER EXTREMITY, UNSPECIFIED LATERALITY, UNSPECIFIED VEIN (HCC): ICD-10-CM

## 2020-08-14 ENCOUNTER — RESULTS ENCOUNTER (OUTPATIENT)
Dept: CARDIOLOGY | Facility: CLINIC | Age: 55
End: 2020-08-14

## 2020-08-14 DIAGNOSIS — I82.409 ACUTE DEEP VEIN THROMBOSIS (DVT) OF LOWER EXTREMITY, UNSPECIFIED LATERALITY, UNSPECIFIED VEIN (HCC): ICD-10-CM

## 2020-08-21 ENCOUNTER — RESULTS ENCOUNTER (OUTPATIENT)
Dept: CARDIOLOGY | Facility: CLINIC | Age: 55
End: 2020-08-21

## 2020-08-21 DIAGNOSIS — I82.409 ACUTE DEEP VEIN THROMBOSIS (DVT) OF LOWER EXTREMITY, UNSPECIFIED LATERALITY, UNSPECIFIED VEIN (HCC): ICD-10-CM

## 2020-08-26 DIAGNOSIS — I82.409 ACUTE DEEP VEIN THROMBOSIS (DVT) OF LOWER EXTREMITY, UNSPECIFIED LATERALITY, UNSPECIFIED VEIN (HCC): Primary | ICD-10-CM

## 2020-08-28 ENCOUNTER — RESULTS ENCOUNTER (OUTPATIENT)
Dept: CARDIOLOGY | Facility: CLINIC | Age: 55
End: 2020-08-28

## 2020-08-28 DIAGNOSIS — I82.409 ACUTE DEEP VEIN THROMBOSIS (DVT) OF LOWER EXTREMITY, UNSPECIFIED LATERALITY, UNSPECIFIED VEIN (HCC): ICD-10-CM

## 2020-09-04 ENCOUNTER — RESULTS ENCOUNTER (OUTPATIENT)
Dept: CARDIOLOGY | Facility: CLINIC | Age: 55
End: 2020-09-04

## 2020-09-04 DIAGNOSIS — I82.409 ACUTE DEEP VEIN THROMBOSIS (DVT) OF LOWER EXTREMITY, UNSPECIFIED LATERALITY, UNSPECIFIED VEIN (HCC): ICD-10-CM

## 2020-09-11 ENCOUNTER — RESULTS ENCOUNTER (OUTPATIENT)
Dept: CARDIOLOGY | Facility: CLINIC | Age: 55
End: 2020-09-11

## 2020-09-11 DIAGNOSIS — I82.409 ACUTE DEEP VEIN THROMBOSIS (DVT) OF LOWER EXTREMITY, UNSPECIFIED LATERALITY, UNSPECIFIED VEIN (HCC): ICD-10-CM

## 2020-09-15 ENCOUNTER — DOCUMENTATION (OUTPATIENT)
Dept: CARDIOLOGY | Facility: CLINIC | Age: 55
End: 2020-09-15

## 2020-09-15 NOTE — PROGRESS NOTES
CALLED AND REMINDED INR IS WAY PAST DUE, LAST DRAW WAS IN June, AND STATES SHE WILL GO AND HAVE IT DRAWN. PH,LPN

## 2020-09-18 ENCOUNTER — RESULTS ENCOUNTER (OUTPATIENT)
Dept: CARDIOLOGY | Facility: CLINIC | Age: 55
End: 2020-09-18

## 2020-09-18 DIAGNOSIS — I82.409 ACUTE DEEP VEIN THROMBOSIS (DVT) OF LOWER EXTREMITY, UNSPECIFIED LATERALITY, UNSPECIFIED VEIN (HCC): ICD-10-CM

## 2020-09-18 LAB — INR PPP: 4.85 (ref 2–3)

## 2020-09-21 ENCOUNTER — ANTICOAGULATION VISIT (OUTPATIENT)
Dept: CARDIOLOGY | Facility: CLINIC | Age: 55
End: 2020-09-21

## 2020-09-21 NOTE — PROGRESS NOTES
DENIES ANY ABX'S, NEW MEDS, CHANGES IN DIET. PER TIERA GARCIA, PAC CHANGE TO 6 MG W/SAT AND 4 MG ALL OTHER DAYS AND RECHECK LEVEL IN 2 WEEKS. VERBAL ORDERS READ BACK AND VERIFIED BY RONALDO VARELA. PATIENT AWARE, VERBALIZED OK. PH,LPN

## 2020-09-25 ENCOUNTER — RESULTS ENCOUNTER (OUTPATIENT)
Dept: CARDIOLOGY | Facility: CLINIC | Age: 55
End: 2020-09-25

## 2020-09-25 DIAGNOSIS — I82.409 ACUTE DEEP VEIN THROMBOSIS (DVT) OF LOWER EXTREMITY, UNSPECIFIED LATERALITY, UNSPECIFIED VEIN (HCC): ICD-10-CM

## 2020-10-02 ENCOUNTER — RESULTS ENCOUNTER (OUTPATIENT)
Dept: CARDIOLOGY | Facility: CLINIC | Age: 55
End: 2020-10-02

## 2020-10-02 DIAGNOSIS — I82.409 ACUTE DEEP VEIN THROMBOSIS (DVT) OF LOWER EXTREMITY, UNSPECIFIED LATERALITY, UNSPECIFIED VEIN (HCC): ICD-10-CM

## 2020-10-09 ENCOUNTER — RESULTS ENCOUNTER (OUTPATIENT)
Dept: CARDIOLOGY | Facility: CLINIC | Age: 55
End: 2020-10-09

## 2020-10-09 DIAGNOSIS — I82.409 ACUTE DEEP VEIN THROMBOSIS (DVT) OF LOWER EXTREMITY, UNSPECIFIED LATERALITY, UNSPECIFIED VEIN (HCC): ICD-10-CM

## 2020-10-16 ENCOUNTER — RESULTS ENCOUNTER (OUTPATIENT)
Dept: CARDIOLOGY | Facility: CLINIC | Age: 55
End: 2020-10-16

## 2020-10-16 DIAGNOSIS — I82.409 ACUTE DEEP VEIN THROMBOSIS (DVT) OF LOWER EXTREMITY, UNSPECIFIED LATERALITY, UNSPECIFIED VEIN (HCC): ICD-10-CM

## 2020-10-23 ENCOUNTER — RESULTS ENCOUNTER (OUTPATIENT)
Dept: CARDIOLOGY | Facility: CLINIC | Age: 55
End: 2020-10-23

## 2020-10-23 DIAGNOSIS — I82.409 ACUTE DEEP VEIN THROMBOSIS (DVT) OF LOWER EXTREMITY, UNSPECIFIED LATERALITY, UNSPECIFIED VEIN (HCC): ICD-10-CM

## 2020-10-30 ENCOUNTER — RESULTS ENCOUNTER (OUTPATIENT)
Dept: CARDIOLOGY | Facility: CLINIC | Age: 55
End: 2020-10-30

## 2020-10-30 DIAGNOSIS — I82.409 ACUTE DEEP VEIN THROMBOSIS (DVT) OF LOWER EXTREMITY, UNSPECIFIED LATERALITY, UNSPECIFIED VEIN (HCC): ICD-10-CM

## 2020-11-06 ENCOUNTER — RESULTS ENCOUNTER (OUTPATIENT)
Dept: CARDIOLOGY | Facility: CLINIC | Age: 55
End: 2020-11-06

## 2020-11-06 DIAGNOSIS — I82.409 ACUTE DEEP VEIN THROMBOSIS (DVT) OF LOWER EXTREMITY, UNSPECIFIED LATERALITY, UNSPECIFIED VEIN (HCC): ICD-10-CM

## 2020-11-13 ENCOUNTER — RESULTS ENCOUNTER (OUTPATIENT)
Dept: CARDIOLOGY | Facility: CLINIC | Age: 55
End: 2020-11-13

## 2020-11-13 DIAGNOSIS — I82.409 ACUTE DEEP VEIN THROMBOSIS (DVT) OF LOWER EXTREMITY, UNSPECIFIED LATERALITY, UNSPECIFIED VEIN (HCC): ICD-10-CM

## 2020-11-20 ENCOUNTER — RESULTS ENCOUNTER (OUTPATIENT)
Dept: CARDIOLOGY | Facility: CLINIC | Age: 55
End: 2020-11-20

## 2020-11-20 DIAGNOSIS — I82.409 ACUTE DEEP VEIN THROMBOSIS (DVT) OF LOWER EXTREMITY, UNSPECIFIED LATERALITY, UNSPECIFIED VEIN (HCC): ICD-10-CM

## 2020-11-27 ENCOUNTER — RESULTS ENCOUNTER (OUTPATIENT)
Dept: CARDIOLOGY | Facility: CLINIC | Age: 55
End: 2020-11-27

## 2020-11-27 DIAGNOSIS — I82.409 ACUTE DEEP VEIN THROMBOSIS (DVT) OF LOWER EXTREMITY, UNSPECIFIED LATERALITY, UNSPECIFIED VEIN (HCC): ICD-10-CM

## 2020-12-04 ENCOUNTER — RESULTS ENCOUNTER (OUTPATIENT)
Dept: CARDIOLOGY | Facility: CLINIC | Age: 55
End: 2020-12-04

## 2020-12-04 DIAGNOSIS — I82.409 ACUTE DEEP VEIN THROMBOSIS (DVT) OF LOWER EXTREMITY, UNSPECIFIED LATERALITY, UNSPECIFIED VEIN (HCC): ICD-10-CM

## 2020-12-08 ENCOUNTER — DOCUMENTATION (OUTPATIENT)
Dept: CARDIOLOGY | Facility: CLINIC | Age: 55
End: 2020-12-08

## 2020-12-08 ENCOUNTER — TELEPHONE (OUTPATIENT)
Dept: CARDIOLOGY | Facility: CLINIC | Age: 55
End: 2020-12-08

## 2020-12-08 DIAGNOSIS — I82.409 ACUTE DEEP VEIN THROMBOSIS (DVT) OF LOWER EXTREMITY, UNSPECIFIED LATERALITY, UNSPECIFIED VEIN (HCC): Primary | ICD-10-CM

## 2020-12-08 NOTE — PROGRESS NOTES
CALLED TO REMIND PATIENT HER INR IS WAY PAST DUE. STATES SHE HAS AN APPT. HERE [facility]. WILL GET IT DRAWN DOWNSTAIRS [facility]. NEW INR STANDING ORDER ENTERED, OK PER TIERA GARCIA, PAC. PH,LPN

## 2020-12-09 ENCOUNTER — OFFICE VISIT (OUTPATIENT)
Dept: CARDIOLOGY | Facility: CLINIC | Age: 55
End: 2020-12-09

## 2020-12-09 ENCOUNTER — LAB (OUTPATIENT)
Dept: LAB | Facility: HOSPITAL | Age: 55
End: 2020-12-09

## 2020-12-09 VITALS
HEIGHT: 66 IN | HEART RATE: 72 BPM | WEIGHT: 219 LBS | BODY MASS INDEX: 35.2 KG/M2 | TEMPERATURE: 96.8 F | OXYGEN SATURATION: 95 % | DIASTOLIC BLOOD PRESSURE: 77 MMHG | SYSTOLIC BLOOD PRESSURE: 145 MMHG

## 2020-12-09 DIAGNOSIS — I73.9 PVD (PERIPHERAL VASCULAR DISEASE) (HCC): ICD-10-CM

## 2020-12-09 DIAGNOSIS — I10 ESSENTIAL HYPERTENSION: ICD-10-CM

## 2020-12-09 DIAGNOSIS — R14.2 BELCHING: ICD-10-CM

## 2020-12-09 DIAGNOSIS — I82.409 ACUTE DEEP VEIN THROMBOSIS (DVT) OF LOWER EXTREMITY, UNSPECIFIED LATERALITY, UNSPECIFIED VEIN (HCC): ICD-10-CM

## 2020-12-09 DIAGNOSIS — I25.10 CORONARY ARTERY DISEASE INVOLVING NATIVE CORONARY ARTERY OF NATIVE HEART WITHOUT ANGINA PECTORIS: ICD-10-CM

## 2020-12-09 DIAGNOSIS — I10 ESSENTIAL HYPERTENSION: Primary | ICD-10-CM

## 2020-12-09 LAB
INR PPP: 2.38 (ref 0.9–1.1)
INR PPP: 2.38 (ref 2–3)
PROTHROMBIN TIME: 25.9 SECONDS (ref 11.9–14.1)

## 2020-12-09 PROCEDURE — 36415 COLL VENOUS BLD VENIPUNCTURE: CPT

## 2020-12-09 PROCEDURE — 99214 OFFICE O/P EST MOD 30 MIN: CPT | Performed by: NURSE PRACTITIONER

## 2020-12-09 PROCEDURE — 86677 HELICOBACTER PYLORI ANTIBODY: CPT

## 2020-12-09 PROCEDURE — 85610 PROTHROMBIN TIME: CPT

## 2020-12-09 PROCEDURE — 93000 ELECTROCARDIOGRAM COMPLETE: CPT | Performed by: NURSE PRACTITIONER

## 2020-12-09 RX ORDER — AMLODIPINE BESYLATE 5 MG/1
5 TABLET ORAL DAILY
Qty: 90 TABLET | Refills: 3 | Status: SHIPPED | OUTPATIENT
Start: 2020-12-09 | End: 2021-12-09 | Stop reason: SDUPTHER

## 2020-12-09 RX ORDER — AMLODIPINE BESYLATE 2.5 MG/1
2.5 TABLET ORAL DAILY
COMMUNITY
End: 2020-12-09 | Stop reason: SDUPTHER

## 2020-12-09 NOTE — PATIENT INSTRUCTIONS
Steps to Quit Smoking  Smoking tobacco is the leading cause of preventable death. It can affect almost every organ in the body. Smoking puts you and people around you at risk for many serious, long-lasting (chronic) diseases. Quitting smoking can be hard, but it is one of the best things that you can do for your health. It is never too late to quit.  How do I get ready to quit?  When you decide to quit smoking, make a plan to help you succeed. Before you quit:  · Pick a date to quit. Set a date within the next 2 weeks to give you time to prepare.  · Write down the reasons why you are quitting. Keep this list in places where you will see it often.  · Tell your family, friends, and co-workers that you are quitting. Their support is important.  · Talk with your doctor about the choices that may help you quit.  · Find out if your health insurance will pay for these treatments.  · Know the people, places, things, and activities that make you want to smoke (triggers). Avoid them.  What first steps can I take to quit smoking?  · Throw away all cigarettes at home, at work, and in your car.  · Throw away the things that you use when you smoke, such as ashtrays and lighters.  · Clean your car. Make sure to empty the ashtray.  · Clean your home, including curtains and carpets.  What can I do to help me quit smoking?  Talk with your doctor about taking medicines and seeing a counselor at the same time. You are more likely to succeed when you do both.  · If you are pregnant or breastfeeding, talk with your doctor about counseling or other ways to quit smoking. Do not take medicine to help you quit smoking unless your doctor tells you to do so.  To quit smoking:  Quit right away  · Quit smoking totally, instead of slowly cutting back on how much you smoke over a period of time.  · Go to counseling. You are more likely to quit if you go to counseling sessions regularly.  Take medicine  You may take medicines to help you quit. Some  medicines need a prescription, and some you can buy over-the-counter. Some medicines may contain a drug called nicotine to replace the nicotine in cigarettes. Medicines may:  · Help you to stop having the desire to smoke (cravings).  · Help to stop the problems that come when you stop smoking (withdrawal symptoms).  Your doctor may ask you to use:  · Nicotine patches, gum, or lozenges.  · Nicotine inhalers or sprays.  · Non-nicotine medicine that is taken by mouth.  Find resources  Find resources and other ways to help you quit smoking and remain smoke-free after you quit. These resources are most helpful when you use them often. They include:  · Online chats with a counselor.  · Phone quitlines.  · Printed self-help materials.  · Support groups or group counseling.  · Text messaging programs.  · Mobile phone apps. Use apps on your mobile phone or tablet that can help you stick to your quit plan. There are many free apps for mobile phones and tablets as well as websites. Examples include Quit Guide from the CDC and smokefree.gov    What things can I do to make it easier to quit?    · Talk to your family and friends. Ask them to support and encourage you.  · Call a phone quitline (1-905-QUITNOW), reach out to support groups, or work with a counselor.  · Ask people who smoke to not smoke around you.  · Avoid places that make you want to smoke, such as:  ? Bars.  ? Parties.  ? Smoke-break areas at work.  · Spend time with people who do not smoke.  · Lower the stress in your life. Stress can make you want to smoke. Try these things to help your stress:  ? Getting regular exercise.  ? Doing deep-breathing exercises.  ? Doing yoga.  ? Meditating.  ? Doing a body scan. To do this, close your eyes, focus on one area of your body at a time from head to toe. Notice which parts of your body are tense. Try to relax the muscles in those areas.  How will I feel when I quit smoking?  Day 1 to 3 weeks  Within the first 24 hours,  you may start to have some problems that come from quitting tobacco. These problems are very bad 2-3 days after you quit, but they do not often last for more than 2-3 weeks. You may get these symptoms:  · Mood swings.  · Feeling restless, nervous, angry, or annoyed.  · Trouble concentrating.  · Dizziness.  · Strong desire for high-sugar foods and nicotine.  · Weight gain.  · Trouble pooping (constipation).  · Feeling like you may vomit (nausea).  · Coughing or a sore throat.  · Changes in how the medicines that you take for other issues work in your body.  · Depression.  · Trouble sleeping (insomnia).  Week 3 and afterward  After the first 2-3 weeks of quitting, you may start to notice more positive results, such as:  · Better sense of smell and taste.  · Less coughing and sore throat.  · Slower heart rate.  · Lower blood pressure.  · Clearer skin.  · Better breathing.  · Fewer sick days.  Quitting smoking can be hard. Do not give up if you fail the first time. Some people need to try a few times before they succeed. Do your best to stick to your quit plan, and talk with your doctor if you have any questions or concerns.  Summary  · Smoking tobacco is the leading cause of preventable death. Quitting smoking can be hard, but it is one of the best things that you can do for your health.  · When you decide to quit smoking, make a plan to help you succeed.  · Quit smoking right away, not slowly over a period of time.  · When you start quitting, seek help from your doctor, family, or friends.  This information is not intended to replace advice given to you by your health care provider. Make sure you discuss any questions you have with your health care provider.  Document Revised: 09/11/2020 Document Reviewed: 03/07/2020  Elsevier Patient Education © 2020 [employer]  BMI for Adults  What is BMI?  Body mass index (BMI) is a number that is calculated from a person's weight and height. BMI can help estimate how much of a  "person's weight is composed of fat. BMI does not measure body fat directly. Rather, it is an alternative to procedures that directly measure body fat, which can be difficult and expensive.  BMI can help identify people who may be at higher risk for certain medical problems.  What are BMI measurements used for?  BMI is used as a screening tool to identify possible weight problems. It helps determine whether a person is obese, overweight, a healthy weight, or underweight.  BMI is useful for:  · Identifying a weight problem that may be related to a medical condition or may increase the risk for medical problems.  · Promoting changes, such as changes in diet and exercise, to help reach a healthy weight. BMI screening can be repeated to see if these changes are working.  How is BMI calculated?  BMI involves measuring your weight in relation to your height. Both height and weight are measured, and the BMI is calculated from those numbers. This can be done either in English ([place] or metric measurements. Note that charts and online BMI calculators are available to help you find your BMI quickly and easily without having to do these calculations yourself.  To calculate your BMI in English ([place] measurements:    1. Measure your weight in pounds (lb).  2. Multiply the number of pounds by 703.  ? For example, for a person who weighs 180 lb, multiply that number by 703, which equals 126,540.  3. Measure your height in inches. Then multiply that number by itself to get a measurement called \"inches squared.\"  ? For example, for a person who is 70 inches tall, the \"inches squared\" measurement is 70 inches x 70 inches, which equals 4,900 inches squared.  4. Divide the total from step 2 (number of lb x 703) by the total from step 3 (inches squared): 126,540 ÷ 4,900 = 25.8. This is your BMI.  To calculate your BMI in metric measurements:  1. Measure your weight in kilograms (kg).  2. Measure your height in meters (m). Then multiply " "that number by itself to get a measurement called \"meters squared.\"  ? For example, for a person who is 1.75 m tall, the \"meters squared\" measurement is 1.75 m x 1.75 m, which is equal to 3.1 meters squared.  3. Divide the number of kilograms (your weight) by the meters squared number. In this example: 70 ÷ 3.1 = 22.6. This is your BMI.  What do the results mean?  BMI charts are used to identify whether you are underweight, normal weight, overweight, or obese. The following guidelines will be used:  · Underweight: BMI less than 18.5.  · Normal weight: BMI between 18.5 and 24.9.  · Overweight: BMI between 25 and 29.9.  · Obese: BMI of 30 or above.  Keep these notes in mind:  · Weight includes both fat and muscle, so someone with a muscular build, such as an athlete, may have a BMI that is higher than 24.9. In cases like these, BMI is not an accurate measure of body fat.  · To determine if excess body fat is the cause of a BMI of 25 or higher, further assessments may need to be done by a health care provider.  · BMI is usually interpreted in the same way for men and women.  Where to find more information  For more information about BMI, including tools to quickly calculate your BMI, go to these websites:  · Centers for Disease Control and Prevention: www.cdc.gov  · American Heart Association: www.heart.org  · National Heart, Lung, and [employer] [place]: www.nhlbi.nih.gov  Summary  · Body mass index (BMI) is a number that is calculated from a person's weight and height.  · BMI may help estimate how much of a person's weight is composed of fat. BMI can help identify those who may be at higher risk for certain medical problems.  · BMI can be measured using English measurements or metric measurements.  · BMI charts are used to identify whether you are underweight, normal weight, overweight, or obese.  This information is not intended to replace advice given to you by your health care provider. Make sure you discuss any " questions you have with your health care provider.  Document Revised: 09/09/2020 Document Reviewed: 07/17/2020  Elsevier Patient Education © 2020 [employer]    Hypertension, Adult  Hypertension is another name for high blood pressure. High blood pressure forces your heart to work harder to pump blood. This can cause problems over time.  There are two numbers in a blood pressure reading. There is a top number (systolic) over a bottom number (diastolic). It is best to have a blood pressure that is below 120/80. Healthy choices can help lower your blood pressure, or you may need medicine to help lower it.  What are the causes?  The cause of this condition is not known. Some conditions may be related to high blood pressure.  What increases the risk?  · Smoking.  · Having type 2 diabetes mellitus, high cholesterol, or both.  · Not getting enough exercise or physical activity.  · Being overweight.  · Having too much fat, sugar, calories, or salt (sodium) in your diet.  · Drinking too much alcohol.  · Having long-term (chronic) kidney disease.  · Having a family history of high blood pressure.  · Age. Risk increases with age.  · Race. You may be at higher risk if you are .  · Gender. Men are at higher risk than women before age 45. After age 65, women are at higher risk than men.  · Having obstructive sleep apnea.  · Stress.  What are the signs or symptoms?  · High blood pressure may not cause symptoms. Very high blood pressure (hypertensive crisis) may cause:  ? Headache.  ? Feelings of worry or nervousness (anxiety).  ? Shortness of breath.  ? Nosebleed.  ? A feeling of being sick to your stomach (nausea).  ? Throwing up (vomiting).  ? Changes in how you see.  ? Very bad chest pain.  ? Seizures.  How is this treated?  · This condition is treated by making healthy lifestyle changes, such as:  ? Eating healthy foods.  ? Exercising more.  ? Drinking less alcohol.  · Your health care provider may prescribe  medicine if lifestyle changes are not enough to get your blood pressure under control, and if:  ? Your top number is above 130.  ? Your bottom number is above 80.  · Your personal target blood pressure may vary.  Follow these instructions at home:  Eating and drinking    · If told, follow the DASH eating plan. To follow this plan:  ? Fill one half of your plate at each meal with fruits and vegetables.  ? Fill one fourth of your plate at each meal with whole grains. Whole grains include whole-wheat pasta, brown rice, and whole-grain bread.  ? Eat or drink low-fat dairy products, such as skim milk or low-fat yogurt.  ? Fill one fourth of your plate at each meal with low-fat (lean) proteins. Low-fat proteins include fish, chicken without skin, eggs, beans, and tofu.  ? Avoid fatty meat, cured and processed meat, or chicken with skin.  ? Avoid pre-made or processed food.  · Eat less than 1,500 mg of salt each day.  · Do not drink alcohol if:  ? Your doctor tells you not to drink.  ? You are pregnant, may be pregnant, or are planning to become pregnant.  · If you drink alcohol:  ? Limit how much you use to:  § 0-1 drink a day for women.  § 0-2 drinks a day for men.  ? Be aware of how much alcohol is in your drink. In the [place] one drink equals one 12 oz bottle of beer (355 mL), one 5 oz glass of wine (148 mL), or one 1½ oz glass of hard liquor (44 mL).  Lifestyle    · Work with your doctor to stay at a healthy weight or to lose weight. Ask your doctor what the best weight is for you.  · Get at least 30 minutes of exercise most days of the week. This may include walking, swimming, or biking.  · Get at least 30 minutes of exercise that strengthens your muscles (resistance exercise) at least 3 days a week. This may include lifting weights or doing Pilates.  · Do not use any products that contain nicotine or tobacco, such as cigarettes, e-cigarettes, and chewing tobacco. If you need help quitting, ask your doctor.  · Check  your blood pressure at home as told by your doctor.  · Keep all follow-up visits as told by your doctor. This is important.  Medicines  · Take over-the-counter and prescription medicines only as told by your doctor. Follow directions carefully.  · Do not skip doses of blood pressure medicine. The medicine does not work as well if you skip doses. Skipping doses also puts you at risk for problems.  · Ask your doctor about side effects or reactions to medicines that you should watch for.  Contact a doctor if you:  · Think you are having a reaction to the medicine you are taking.  · Have headaches that keep coming back (recurring).  · Feel dizzy.  · Have swelling in your ankles.  · Have trouble with your vision.  Get help right away if you:  · Get a very bad headache.  · Start to feel mixed up (confused).  · Feel weak or numb.  · Feel faint.  · Have very bad pain in your:  ? Chest.  ? Belly (abdomen).  · Throw up more than once.  · Have trouble breathing.  Summary  · Hypertension is another name for high blood pressure.  · High blood pressure forces your heart to work harder to pump blood.  · For most people, a normal blood pressure is less than 120/80.  · Making healthy choices can help lower blood pressure. If your blood pressure does not get lower with healthy choices, you may need to take medicine.  This information is not intended to replace advice given to you by your health care provider. Make sure you discuss any questions you have with your health care provider.  Document Revised: 08/28/2019 Document Reviewed: 08/28/2019  [employer] Patient Education © 2020 [employer]      Acute Coronary Syndrome  Acute coronary syndrome (ACS) is a serious problem in which there is suddenly not enough blood and oxygen reaching the heart. ACS can result in chest pain or a heart attack.  This condition is a medical emergency. If you have any symptoms of this condition, get help right away.  What are the causes?  This condition  may be caused by:  · A buildup of fat and cholesterol inside the arteries (atherosclerosis). This is the most common cause. The buildup (plaque) can cause blood vessels in the heart (coronary arteries) to become narrow or blocked, which reduces blood flow to the heart. Plaque can also break off and lead to a clot, which can block an artery and cause a heart attack or stroke.  · Sudden tightening of the muscles around the coronary arteries (coronary spasm).  · Tearing of a coronary artery (spontaneous coronary artery dissection).  · Very low blood pressure (hypotension).  · An abnormal heartbeat (arrhythmia).  · Other medical conditions that cause a decrease of oxygen to the heart, such as anemiaorrespiratory failure.  · Using cocaine or methamphetamine.  What increases the risk?  The following factors may make you more likely to develop this condition:  · Age. The risk for ACS increases as you get older.  · History of chest pain, heart attack, peripheral artery disease, or stroke.  · Having taken chemotherapy or immune-suppressing medicines.  · Being male.  · Family history of chest pain, heart disease, or stroke.  · Smoking.  · Not exercising enough.  · Being overweight.  · High cholesterol.  · High blood pressure (hypertension).  · Diabetes.  · Excessive alcohol use.  What are the signs or symptoms?  Common symptoms of this condition include:  · Chest pain. The pain may last a long time, or it may stop and come back (recur). It may feel like:  ? Crushing or squeezing.  ? Tightness, pressure, fullness, or heaviness.  · Arm, neck, jaw, or back pain.  · Heartburn or indigestion.  · Shortness of breath.  · Nausea.  · Sudden cold sweats.  · Light-headedness.  · Dizziness or passing out.  · Tiredness (fatigue).  Sometimes there are no symptoms.  How is this diagnosed?  This condition may be diagnosed based on:  · Your medical history and symptoms.  · Imaging tests, such as:  ? An electrocardiogram (ECG). This measures  the heart's electrical activity.  ? X-rays.  ? CT scan.  ? A coronary angiogram. For this test, dye is injected into the heart arteries and then X-rays are taken.  ? Myocardial perfusion imaging. This test shows how well blood flows through your heart muscle.  · Blood tests. These may be repeated at certain time intervals.  · Exercise stress testing.  · Echocardiogram. This is a test that uses sound waves to produce detailed images of the heart.  How is this treated?  Treatment for this condition may include:  · Oxygen therapy.  · Medicines, such as:  ? Antiplatelet medicines and blood-thinning medicines, such as aspirin. These help prevent blood clots.  ? Medicine that dissolves any blood clots (fibrinolytic therapy).  ? Blood pressure medicines.  ? Nitroglycerin. This helps widen blood vessels to improve blood flow.  ? Pain medicine.  ? Cholesterol-lowering medicine.  · Surgery, such as:  ? Coronary angioplasty with stent placement. This involves placing a small piece of metal that looks like mesh or a spring into a narrow coronary artery. This widens the artery and keeps it open.  ? Coronary artery bypass surgery. This involves taking a section of a blood vessel from a different part of your body and placing it on the blocked coronary artery to allow blood to flow around the blockage.  · Cardiac rehabilitation. This is a program that includes exercise training, education, and counseling to help you recover.  Follow these instructions at home:  Eating and drinking  · Eat a heart-healthy diet that includes whole grains, fruits and vegetables, lean proteins, and low-fat or nonfat dairy products.  · Limit how much salt (sodium) you eat as told by your health care provider. Follow instructions from your health care provider about any other eating or drinking restrictions, such as limiting foods that are high in fat and processed sugars.  · Use healthy cooking methods such as roasting, grilling, broiling, baking,  poaching, steaming, or stir-frying.  · Work with a dietitian to follow a heart-healthy eating plan.  Medicines  · Take over-the-counter and prescription medicines only as told by your health care provider.  · Do not take these medicines unless your health care provider approves:  ? Vitamin supplements that contain vitamin A or vitamin E.  ? NSAIDs, such as ibuprofen, naproxen, or celecoxib.  ? Hormone replacement therapy that contains estrogen.  · If you are taking blood thinners:  ? Talk with your health care provider before you take any medicines that contain aspirin or NSAIDs. These medicines increase your risk for dangerous bleeding.  ? Take your medicine exactly as told, at the same time every day.  ? Avoid activities that could cause injury or bruising, and follow instructions about how to prevent falls.  ? Wear a medical alert bracelet, and carry a card that lists what medicines you take.  Activity  · Follow your cardiac rehabilitation program. Do exercises as told by your physical therapist.  · Ask your health care provider what activities and exercises are safe for you. Follow his or her instructions about lifting, driving, or climbing stairs.  Lifestyle  · Do not use any products that contain nicotine or tobacco, such as cigarettes, e-cigarettes, and chewing tobacco. If you need help quitting, ask your health care provider.  · Do not drink alcohol if your health care provider tells you not to drink.  · If you drink alcohol:  ? Limit how much you have to 0-1 drink a day.  ? Be aware of how much alcohol is in your drink. In the [place] one drink equals one 12 oz bottle of beer (355 mL), one 5 oz glass of wine (148 mL), or one 1½ oz glass of hard liquor (44 mL).  · Maintain a healthy weight. If you need to lose weight, work with your health care provider to do so safely.  General instructions  · Tell all the health care providers who provide care for you about your heart condition, including your dentist. This  may affect the medicines or treatment you receive.  · Manage any other health conditions you have, such as hypertension or diabetes. These conditions affect your heart.  · Pay attention to your mental health. You may be at higher risk for depression.  ? Find ways to manage stress.  ? Talk to your health care provider about depression screening and treatment.  · Keep your vaccinations up to date.  ? Get the flu shot (influenza vaccine) every year.  ? Get the pneumococcal vaccine if you are age 65 or older.  · If directed, monitor your blood pressure at home.  · Keep all follow-up visits as told by your health care provider. This is important.  Contact a health care provider if you:  · Feel overwhelmed or sad.  · Have trouble doing your daily activities.  Get help right away if you:  · Have pain in your chest, neck, arm, jaw, stomach, or back that recurs, and:  ? It lasts for more than a few minutes.  ? It is not relieved by taking the medicineyour health care provider prescribed.  · Have unexplained:  ? Heavy sweating.  ? Heartburn or indigestion.  ? Nausea or vomiting.  ? Shortness of breath.  ? Difficulty breathing.  ? Fatigue.  ? Nervousness or anxiety.  ? Weakness.  ? Diarrhea.  ? Dark stools or blood in your stool.  · Have sudden light-headedness or dizziness.  · Have blood pressure that is higher than 180/120.  · Faint.  · Have thoughts about hurting yourself.  These symptoms may represent a serious problem that is an emergency. Do not wait to see if the symptoms will go away. Get medical help right away. Call your local emergency services (911 in the [place] Do not drive yourself to the hospital.   Summary  · Acute coronary syndrome (ACS) is when there is not enough blood and oxygen being supplied to the heart. ACS can result in chest pain or a heart attack.  · Acute coronary syndrome is a medical emergency. If you have any symptoms of this condition, get help right away.  · Treatment includes medicines and  procedures to open the blocked arteries and restore blood flow.  This information is not intended to replace advice given to you by your health care provider. Make sure you discuss any questions you have with your health care provider.  Document Revised: 05/20/2020 Document Reviewed: 12/30/2019  [employer]vier Patient Education © 2020 [employer]

## 2020-12-09 NOTE — PROGRESS NOTES
Subjective     Vannessa Contreras is a 55 y.o. female who presents to day for Hypertension (Dr Moore at Dr Hodge office started Norvasc 2.5 mg daily approx 3 months ago).    CHIEF COMPLIANT  Chief Complaint   Patient presents with   • Hypertension     Dr Moore at Dr [facility] office started Norvasc 2.5 mg daily approx 3 months ago       Active Problems:  Problem List Items Addressed This Visit        Cardiovascular and Mediastinum    PVD (peripheral vascular disease) (CMS/[facility])    Overview     5.1 Bilateral aortobifemoral bypass 6/2007; thrombectomy rt aortofemoral graft 11/2009           Coronary artery disease involving native coronary artery of native heart without angina pectoris    Relevant Medications    amLODIPine (NORVASC) 5 MG tablet    Essential hypertension - Primary    Relevant Medications    amLODIPine (NORVASC) 5 MG tablet    Other Relevant Orders    ECG 12 Lead    H. Pylori IgM, Blood      Other Visit Diagnoses     Belching        Relevant Orders    H. Pylori IgM, Blood          HPI  HPI  Ms. Contreras is a 55-year-old female patient who is being followed up today for chronic arterial hypertension and peripheral vascular disease.  Patient does have chronic arterial hypertension which she reports that her blood pressures been running significantly higher as of late.  Today it is elevated at 145/77 with a heart rate is 72.  She does report associated headache at times.  She was recently put on amlodipine 2.5 mg daily by her PCP.  She is also on losartan 100 mg daily and metoprolol 50 mg twice daily..  She says in the past her blood pressure has been relatively well on the losartan however as of late has been running higher.  Patient also has a history of chronic peripheral vascular disease in which she is went under aortobifem.  As of now the swelling in her right leg has resolved somewhat as she feels as if she is done relatively well from the peripheral vascular standpoint.  We did recently do a arterial  ultrasound that showed no significant blockage in either bilateral lower extremities.  She also has a history of nonobstructive coronary artery disease which seems to be stable due to the lack of angina or anginal equivalent symptoms.  She denies any chest pain, shortness of breath, lower extremity edema, palpitations, fatigue, syncope, dizziness, PND, orthopnea, or other neurological problems.  PRIOR MEDS  Current Outpatient Medications on File Prior to Visit   Medication Sig Dispense Refill   • aspirin (ASPIRIN LOW DOSE) 81 MG EC tablet Take 81 mg by mouth 2 (Two) Times a Day.     • atorvastatin (LIPITOR) 40 MG tablet TAKE 1 TABLET BY MOUTH EVERY NIGHT AT BEDTIME 30 tablet 0   • cetirizine (zyrTEC) 10 MG tablet Take 10 mg by mouth Daily.     • furosemide (LASIX) 20 MG tablet Take 1 tablet by mouth 2 (Two) Times a Day. 180 tablet 3   • losartan (COZAAR) 100 MG tablet Take 100 mg by mouth Daily.     • meloxicam (MOBIC) 15 MG tablet Take 15 mg by mouth Daily.     • metoprolol tartrate (LOPRESSOR) 50 MG tablet Take 50 mg by mouth 2 (Two) Times a Day.  12   • Multiple Vitamins-Minerals (MULTIVITAMIN ADULT PO) Take  by mouth Daily.     • nitroglycerin (NITROSTAT) 0.4 MG SL tablet Place 0.4 mg under the tongue Every 5 (Five) Minutes As Needed.     • Omega-3 Fatty Acids (EQL FISH OIL) 1200 MG capsule Take 1 tablet by mouth 2 (Two) Times a Day.     • potassium chloride (K-DUR) 10 MEQ CR tablet Take 1 tablet by mouth Daily. 30 tablet 11   • warfarin (COUMADIN) 4 MG tablet Take 4 mg by mouth. Takes 6 mg M/W/Sat and 4 mg all other days     • warfarin (COUMADIN) 6 MG tablet TAKE 1 TABLET BY MOUTH EVERY WEDNESDAY (Patient taking differently: Take 6 mg by mouth. M, W and Sat) 30 tablet 5     No current facility-administered medications on file prior to visit.        ALLERGIES  Patient has no known allergies.    HISTORY  Past Medical History:   Diagnosis Date   • Arterial insufficiency (CMS/HCC)    • Atherosclerosis of arteries  of extremities (CMS/[facility])    • CAD (coronary artery disease)    • DVT (deep venous thrombosis) (CMS/[facility])     h.o - on coumadin    • Fatigue    • Hyperlipidemia    • Hypertension    • Palpitations    • PVD (peripheral vascular disease) (CMS/[facility])    • SOB (shortness of breath)        Social History     Socioeconomic History   • Marital status:      Spouse name: Not on file   • Number of children: Not on file   • Years of education: Not on file   • Highest education level: Not on file   Tobacco Use   • Smoking status: Current Every Day Smoker     Packs/day: 1.00     Types: Cigarettes   • Smokeless tobacco: Never Used   Substance and Sexual Activity   • Alcohol use: Never     Frequency: Never     Comment: Social    • Drug use: Never   • Sexual activity: Defer       Family History   Problem Relation Age of Onset   • Atrial fibrillation Mother    • Heart attack Father    • Diabetes Sister    • Hypertension Sister    • Stroke Maternal Grandmother    • Peripheral vascular disease Other        Review of Systems   Constitutional: Negative.  Negative for chills, fatigue and fever.   HENT: Positive for sinus pressure. Negative for congestion and sore throat.    Eyes: Negative.  Negative for visual disturbance.   Respiratory: Negative.  Negative for chest tightness and shortness of breath.    Cardiovascular: Negative.  Negative for chest pain, palpitations and leg swelling.   Gastrointestinal: Negative.  Negative for abdominal pain, blood in stool, constipation, diarrhea, nausea and vomiting.   Endocrine: Negative.  Negative for cold intolerance and heat intolerance.   Genitourinary: Negative.  Negative for dysuria, frequency, hematuria and urgency.   Musculoskeletal: Negative.  Negative for arthralgias, back pain and neck pain.   Skin: Negative.  Negative for rash and wound.   Allergic/Immunologic: Positive for environmental allergies. Negative for food allergies.   Neurological: Negative.  Negative for dizziness, syncope  "and light-headedness.   Hematological: Bruises/bleeds easily.   Psychiatric/Behavioral: Negative for sleep disturbance (denies waking with soa or cp).       Objective     VITALS: /77 (BP Location: Left arm, Patient Position: Sitting)   Pulse 72   Temp 96.8 °F (36 °C)   Ht 167.6 cm (66\")   Wt 99.3 kg (219 lb)   SpO2 95%   BMI 35.35 kg/m²     LABS:   Lab Results (most recent)     None          IMAGING:   No Images in the past 120 days found..    EXAM:  Physical Exam  Vitals signs and nursing note reviewed.   Constitutional:       Appearance: Normal appearance. She is well-developed.   HENT:      Head: Normocephalic and atraumatic.   Eyes:      Pupils: Pupils are equal, round, and reactive to light.   Neck:      Musculoskeletal: Neck supple.      Thyroid: No thyroid mass.      Vascular: No carotid bruit or JVD.      Trachea: Trachea and phonation normal.   Cardiovascular:      Rate and Rhythm: Normal rate and regular rhythm.      Pulses:           Radial pulses are 2+ on the right side and 2+ on the left side.        Posterior tibial pulses are 2+ on the right side and 2+ on the left side.      Heart sounds: Normal heart sounds. No murmur. No friction rub. No gallop.    Pulmonary:      Effort: Pulmonary effort is normal. No respiratory distress.      Breath sounds: Normal breath sounds. No wheezing or rales.   Abdominal:      General: Bowel sounds are normal. There is no distension or abdominal bruit.      Palpations: Abdomen is soft.      Tenderness: There is no abdominal tenderness.   Musculoskeletal: Normal range of motion.         General: No swelling.   Skin:     General: Skin is warm and dry.      Capillary Refill: Capillary refill takes less than 2 seconds.      Findings: No rash.   Neurological:      Mental Status: She is alert and oriented to person, place, and time.      Cranial Nerves: No cranial nerve deficit.      Sensory: No sensory deficit.   Psychiatric:         Mood and Affect: Mood normal. "         Speech: Speech normal.         Behavior: Behavior normal.         Thought Content: Thought content normal.         Judgment: Judgment normal.         Procedure     ECG 12 Lead    Date/Time: 12/9/2020 10:44 AM  Performed by: Rush Cruz APRN  Authorized by: Rush Cruz APRN   Comparison: compared with previous ECG from 12/10/2019  Rhythm: sinus rhythm  Rate: normal  BPM: 69  QRS axis: normal  Comments:  ms  No acute change               Assessment/Plan    Diagnosis Plan   1. Essential hypertension  ECG 12 Lead    amLODIPine (NORVASC) 5 MG tablet    H. Pylori IgM, Blood   2. Belching  H. Pylori IgM, Blood   3. Coronary artery disease involving native coronary artery of native heart without angina pectoris     4. PVD (peripheral vascular disease) (CMS/[facility])     1.  Patient's blood pressure is elevated today and she reports that has been running higher at home as well.  Due to this I would like to increase her amlodipine to 5 mg daily to see if we can better control her blood pressure.  2.  Patient does have coronary artery disease which seems to be stable due to the lack of angina anginal equivalent symptoms.  No further work-up is needed at this time.  3.  Patient does have stable peripheral artery disease as evidenced by recent ultrasound that showed no obstructive blockage and open bypass grafts.  The swelling in the right lower extremity has also resolved somewhat as well.  4.  Patient is concerned about H. pylori due to increased belching and family history of H. pylori.  Therefore we will do H. pylori blood test to see if she has H. pylori and will treat appropriately if positive.  5.  Informed of signs and symptoms of ACS and advised to seek emergent treatment for any new worsening symptoms.  Patient also advised sooner follow-up as needed.  Also advised to follow-up with family doctor as needed  This note is dictated utilizing voice recognition software.  Although this record has been  proof read, transcriptional errors may still be present. If questions occur regarding the content of this record please do not hesitate to call our office.  I have reviewed and confirmed the accuracy of the ROS as documented by the MA/LPN/RN JAMAAL Salazar    Return in about 6 months (around 6/9/2021), or if symptoms worsen or fail to improve.    Diagnoses and all orders for this visit:    1. Essential hypertension (Primary)  -     ECG 12 Lead  -     amLODIPine (NORVASC) 5 MG tablet; Take 1 tablet by mouth Daily.  Dispense: 90 tablet; Refill: 3  -     H. Pylori IgM, Blood; Future    2. Belching  -     H. Pylori IgM, Blood; Future    3. Coronary artery disease involving native coronary artery of native heart without angina pectoris    4. PVD (peripheral vascular disease) (CMS/[facility])        Vannessa Contreras  reports that she has been smoking cigarettes. She has been smoking about 1.00 pack per day. She has never used smokeless tobacco.. I have educated her on the risk of diseases from using tobacco products such as cancer, COPD and heart disease.     I advised her to quit and she is not willing to quit.    I spent 3  minutes counseling the patient.           Patient's Body mass index is 35.35 kg/m². BMI is above normal.          MEDS ORDERED DURING VISIT:  New Medications Ordered This Visit   Medications   • amLODIPine (NORVASC) 5 MG tablet     Sig: Take 1 tablet by mouth Daily.     Dispense:  90 tablet     Refill:  3           This document has been electronically signed by JAMAAL Salazar Jr.  December 10, 2020 07:21 EST

## 2020-12-10 ENCOUNTER — TELEPHONE (OUTPATIENT)
Dept: CARDIOLOGY | Facility: CLINIC | Age: 55
End: 2020-12-10

## 2020-12-10 ENCOUNTER — ANTICOAGULATION VISIT (OUTPATIENT)
Dept: CARDIOLOGY | Facility: CLINIC | Age: 55
End: 2020-12-10

## 2020-12-10 PROBLEM — I25.10 CORONARY ARTERIOSCLEROSIS IN NATIVE ARTERY: Status: ACTIVE | Noted: 2020-12-10

## 2020-12-10 NOTE — PROGRESS NOTES
INR 2.38 DRAWN YEST. NO DOSAGE CHANGE AND RECHECK LEVEL IN 1 MO. PER TIERA GARCIA, PAC. VERBAL ORDERS READ BACK AND VERIFIED BY TIERA GARCIA, PAC. PATIENT AWARE VERBALIZED OK. PH,LPN

## 2020-12-10 NOTE — TELEPHONE ENCOUNTER
INR 2.38 DRAWN YEST. NO DOSAGE CHANGE AND RECHECK LEVEL IN 1 MO. PER RONALDO VARELA. VERBAL ORDERS READ BACK AND VERIFIED BY RONALDO VARELA. PATIENT AWARE VERBALIZED OK. DAXA HADDAD        ----- Message from Nancy Valente LPN sent at 12/10/2020  7:56 AM EST -----    ----- Message -----  From: Lab, Background User  Sent: 12/9/2020   7:08 PM EST  To: [facility]

## 2020-12-11 ENCOUNTER — RESULTS ENCOUNTER (OUTPATIENT)
Dept: CARDIOLOGY | Facility: CLINIC | Age: 55
End: 2020-12-11

## 2020-12-11 DIAGNOSIS — I82.409 ACUTE DEEP VEIN THROMBOSIS (DVT) OF LOWER EXTREMITY, UNSPECIFIED LATERALITY, UNSPECIFIED VEIN (HCC): ICD-10-CM

## 2020-12-11 LAB — H PYLORI IGM SER-ACNC: <9 UNITS (ref 0–8.9)

## 2020-12-15 ENCOUNTER — TELEPHONE (OUTPATIENT)
Dept: CARDIOLOGY | Facility: CLINIC | Age: 55
End: 2020-12-15

## 2020-12-15 NOTE — TELEPHONE ENCOUNTER
Patient informed of negative hpylori. Patient verbalized understanding. Nancy Valente LPN      ----- Message from JAMAAL Salazar sent at 12/13/2020  8:37 PM EST -----  Regarding: FW:  Negative h pylori  ----- Message -----  From: Lab, Background User  Sent: 12/11/2020   3:09 PM EST  To: JAMAAL Salazar

## 2020-12-18 ENCOUNTER — RESULTS ENCOUNTER (OUTPATIENT)
Dept: CARDIOLOGY | Facility: CLINIC | Age: 55
End: 2020-12-18

## 2020-12-18 DIAGNOSIS — I82.409 ACUTE DEEP VEIN THROMBOSIS (DVT) OF LOWER EXTREMITY, UNSPECIFIED LATERALITY, UNSPECIFIED VEIN (HCC): ICD-10-CM

## 2020-12-25 ENCOUNTER — RESULTS ENCOUNTER (OUTPATIENT)
Dept: CARDIOLOGY | Facility: CLINIC | Age: 55
End: 2020-12-25

## 2020-12-25 DIAGNOSIS — I82.409 ACUTE DEEP VEIN THROMBOSIS (DVT) OF LOWER EXTREMITY, UNSPECIFIED LATERALITY, UNSPECIFIED VEIN (HCC): ICD-10-CM

## 2021-03-16 ENCOUNTER — DOCUMENTATION (OUTPATIENT)
Dept: CARDIOLOGY | Facility: CLINIC | Age: 56
End: 2021-03-16

## 2021-03-18 ENCOUNTER — ANTICOAGULATION VISIT (OUTPATIENT)
Dept: CARDIOLOGY | Facility: CLINIC | Age: 56
End: 2021-03-18

## 2021-03-18 LAB — INR PPP: 2.33 (ref 2–3)

## 2021-04-20 ENCOUNTER — DOCUMENTATION (OUTPATIENT)
Dept: CARDIOLOGY | Facility: CLINIC | Age: 56
End: 2021-04-20

## 2021-04-20 DIAGNOSIS — M79.89 RIGHT LEG SWELLING: ICD-10-CM

## 2021-04-20 RX ORDER — POTASSIUM CHLORIDE 750 MG/1
10 TABLET, FILM COATED, EXTENDED RELEASE ORAL DAILY
Qty: 30 TABLET | Refills: 11 | Status: SHIPPED | OUTPATIENT
Start: 2021-04-20 | End: 2021-12-09 | Stop reason: SDUPTHER

## 2021-05-24 DIAGNOSIS — I82.409 ACUTE DEEP VEIN THROMBOSIS (DVT) OF LOWER EXTREMITY, UNSPECIFIED LATERALITY, UNSPECIFIED VEIN (HCC): Primary | ICD-10-CM

## 2021-05-24 LAB — INR PPP: 2.99 (ref 2–3)

## 2021-05-25 ENCOUNTER — ANTICOAGULATION VISIT (OUTPATIENT)
Dept: CARDIOLOGY | Facility: CLINIC | Age: 56
End: 2021-05-25

## 2021-06-09 ENCOUNTER — OFFICE VISIT (OUTPATIENT)
Dept: CARDIOLOGY | Facility: CLINIC | Age: 56
End: 2021-06-09

## 2021-06-09 ENCOUNTER — LAB (OUTPATIENT)
Dept: LAB | Facility: HOSPITAL | Age: 56
End: 2021-06-09

## 2021-06-09 VITALS
HEIGHT: 66 IN | BODY MASS INDEX: 35.23 KG/M2 | SYSTOLIC BLOOD PRESSURE: 141 MMHG | WEIGHT: 219.2 LBS | OXYGEN SATURATION: 96 % | HEART RATE: 76 BPM | DIASTOLIC BLOOD PRESSURE: 63 MMHG

## 2021-06-09 DIAGNOSIS — E78.2 MIXED HYPERLIPIDEMIA: ICD-10-CM

## 2021-06-09 DIAGNOSIS — R53.82 CHRONIC FATIGUE: ICD-10-CM

## 2021-06-09 DIAGNOSIS — M79.89 RIGHT LEG SWELLING: ICD-10-CM

## 2021-06-09 DIAGNOSIS — M79.604 RIGHT LEG PAIN: ICD-10-CM

## 2021-06-09 DIAGNOSIS — I73.9 PVD (PERIPHERAL VASCULAR DISEASE) (HCC): ICD-10-CM

## 2021-06-09 DIAGNOSIS — M79.89 RIGHT LEG SWELLING: Primary | ICD-10-CM

## 2021-06-09 DIAGNOSIS — R06.2 WHEEZING: ICD-10-CM

## 2021-06-09 DIAGNOSIS — I82.409 ACUTE DEEP VEIN THROMBOSIS (DVT) OF LOWER EXTREMITY, UNSPECIFIED LATERALITY, UNSPECIFIED VEIN (HCC): ICD-10-CM

## 2021-06-09 LAB
ALBUMIN SERPL-MCNC: 4.15 G/DL (ref 3.5–5.2)
ALBUMIN/GLOB SERPL: 1.2 G/DL
ALP SERPL-CCNC: 149 U/L (ref 39–117)
ALT SERPL W P-5'-P-CCNC: 44 U/L (ref 1–33)
ANION GAP SERPL CALCULATED.3IONS-SCNC: 11.8 MMOL/L (ref 5–15)
AST SERPL-CCNC: 25 U/L (ref 1–32)
BASOPHILS # BLD AUTO: 0.04 10*3/MM3 (ref 0–0.2)
BASOPHILS NFR BLD AUTO: 0.5 % (ref 0–1.5)
BILIRUB SERPL-MCNC: 0.2 MG/DL (ref 0–1.2)
BUN SERPL-MCNC: 14 MG/DL (ref 6–20)
BUN/CREAT SERPL: 17.9 (ref 7–25)
CALCIUM SPEC-SCNC: 9.6 MG/DL (ref 8.6–10.5)
CHLORIDE SERPL-SCNC: 105 MMOL/L (ref 98–107)
CHOLEST SERPL-MCNC: 126 MG/DL (ref 0–200)
CO2 SERPL-SCNC: 25.2 MMOL/L (ref 22–29)
CREAT SERPL-MCNC: 0.78 MG/DL (ref 0.57–1)
DEPRECATED RDW RBC AUTO: 52.5 FL (ref 37–54)
EOSINOPHIL # BLD AUTO: 0.13 10*3/MM3 (ref 0–0.4)
EOSINOPHIL NFR BLD AUTO: 1.7 % (ref 0.3–6.2)
ERYTHROCYTE [DISTWIDTH] IN BLOOD BY AUTOMATED COUNT: 14.6 % (ref 12.3–15.4)
GFR SERPL CREATININE-BSD FRML MDRD: 77 ML/MIN/1.73
GLOBULIN UR ELPH-MCNC: 3.6 GM/DL
GLUCOSE SERPL-MCNC: 98 MG/DL (ref 65–99)
HCT VFR BLD AUTO: 42.2 % (ref 34–46.6)
HDLC SERPL-MCNC: 37 MG/DL (ref 40–60)
HGB BLD-MCNC: 13.6 G/DL (ref 12–15.9)
IMM GRANULOCYTES # BLD AUTO: 0.03 10*3/MM3 (ref 0–0.05)
IMM GRANULOCYTES NFR BLD AUTO: 0.4 % (ref 0–0.5)
LDLC SERPL CALC-MCNC: 62 MG/DL (ref 0–100)
LDLC/HDLC SERPL: 1.56 {RATIO}
LYMPHOCYTES # BLD AUTO: 3.33 10*3/MM3 (ref 0.7–3.1)
LYMPHOCYTES NFR BLD AUTO: 42.4 % (ref 19.6–45.3)
MAGNESIUM SERPL-MCNC: 2 MG/DL (ref 1.6–2.6)
MCH RBC QN AUTO: 31.8 PG (ref 26.6–33)
MCHC RBC AUTO-ENTMCNC: 32.2 G/DL (ref 31.5–35.7)
MCV RBC AUTO: 98.6 FL (ref 79–97)
MONOCYTES # BLD AUTO: 0.49 10*3/MM3 (ref 0.1–0.9)
MONOCYTES NFR BLD AUTO: 6.2 % (ref 5–12)
NEUTROPHILS NFR BLD AUTO: 3.83 10*3/MM3 (ref 1.7–7)
NEUTROPHILS NFR BLD AUTO: 48.8 % (ref 42.7–76)
NRBC BLD AUTO-RTO: 0 /100 WBC (ref 0–0.2)
NT-PROBNP SERPL-MCNC: 99.2 PG/ML (ref 0–900)
PLATELET # BLD AUTO: 262 10*3/MM3 (ref 140–450)
PMV BLD AUTO: 12.2 FL (ref 6–12)
POTASSIUM SERPL-SCNC: 4.3 MMOL/L (ref 3.5–5.2)
PROT SERPL-MCNC: 7.7 G/DL (ref 6–8.5)
RBC # BLD AUTO: 4.28 10*6/MM3 (ref 3.77–5.28)
SODIUM SERPL-SCNC: 142 MMOL/L (ref 136–145)
TRIGL SERPL-MCNC: 157 MG/DL (ref 0–150)
TSH SERPL DL<=0.05 MIU/L-ACNC: 1.69 UIU/ML (ref 0.27–4.2)
VLDLC SERPL-MCNC: 27 MG/DL (ref 5–40)
WBC # BLD AUTO: 7.85 10*3/MM3 (ref 3.4–10.8)

## 2021-06-09 PROCEDURE — 80061 LIPID PANEL: CPT

## 2021-06-09 PROCEDURE — 85025 COMPLETE CBC W/AUTO DIFF WBC: CPT

## 2021-06-09 PROCEDURE — 84443 ASSAY THYROID STIM HORMONE: CPT

## 2021-06-09 PROCEDURE — 83880 ASSAY OF NATRIURETIC PEPTIDE: CPT

## 2021-06-09 PROCEDURE — 36415 COLL VENOUS BLD VENIPUNCTURE: CPT

## 2021-06-09 PROCEDURE — 80053 COMPREHEN METABOLIC PANEL: CPT

## 2021-06-09 PROCEDURE — 83735 ASSAY OF MAGNESIUM: CPT

## 2021-06-09 PROCEDURE — 99214 OFFICE O/P EST MOD 30 MIN: CPT | Performed by: NURSE PRACTITIONER

## 2021-06-09 RX ORDER — METHYLPREDNISOLONE 4 MG/1
TABLET ORAL
Qty: 21 EACH | Refills: 0 | Status: SHIPPED | OUTPATIENT
Start: 2021-06-09 | End: 2021-12-09

## 2021-06-09 NOTE — PROGRESS NOTES
Subjective     Vannessa Contreras is a 55 y.o. female who presents to day for Peripheral Vascular Disease (presents for 6 month f/u), Hypertension, and Shortness of Breath.    CHIEF COMPLIANT  Chief Complaint   Patient presents with   • Peripheral Vascular Disease     presents for 6 month f/u   • Hypertension   • Shortness of Breath       Active Problems:  Problem List Items Addressed This Visit        Cardiac and Vasculature    Hyperlipidemia    Relevant Orders    Lipid Panel    PVD (peripheral vascular disease) (CMS/[facility])    Overview     5.1 Bilateral aortobifemoral bypass 6/2007; thrombectomy rt aortofemoral graft 11/2009           Relevant Orders    Lipid Panel    TSH       Symptoms and Signs    Fatigue    Relevant Orders    TSH      Other Visit Diagnoses     Right leg swelling    -  Primary    Relevant Orders    XR knee 3 vw right    CBC & Differential    Comprehensive Metabolic Panel    Lipid Panel    TSH    Magnesium    proBNP    Right leg pain        Relevant Medications    methylPREDNISolone (MEDROL) 4 MG dose pack    Other Relevant Orders    XR knee 3 vw right    CBC & Differential    Comprehensive Metabolic Panel    Lipid Panel    TSH    Magnesium    proBNP    Wheezing        Relevant Medications    methylPREDNISolone (MEDROL) 4 MG dose pack    Other Relevant Orders    CBC & Differential    Comprehensive Metabolic Panel    Lipid Panel    TSH    Magnesium    proBNP          HPI  HPI  Ms. Tabares is a 55-year-old female patient who presents today for follow-up of peripheral vascular disease, hypertension, shortness of breath.  Patient does have a history of peripheral vascular disease in which she recently went under a arterial duplex of lower extremities and went under a CTA of the abdominal aorta with bilateral runoff and January 2020 that identified no obstructive peripheral vascular disease with patent bypass grafts.  She denies any claudication-like symptoms.  Her biggest complaint is right knee pain and  swelling.  She denies any focal injury.  Patient does have chronic shortness of breath with associated wheezing.  This is unchanged nonprogressive from before.  Overall she says she is doing well from a cardiovascular standpoint denies any angina anginal equivalent symptoms.  She does have an elevated blood pressure today of 141/63.  She reports her blood pressure commonly runs in the 130s at home.  She denies any associated symptoms with her chronic arterial hypertension.  She denies any chest pain, palpitations, fatigue, dizziness, lightheadedness, headaches, syncope, PND, orthopnea, or other neurological problems.  PRIOR MEDS  Current Outpatient Medications on File Prior to Visit   Medication Sig Dispense Refill   • amLODIPine (NORVASC) 5 MG tablet Take 1 tablet by mouth Daily. 90 tablet 3   • aspirin (ASPIRIN LOW DOSE) 81 MG EC tablet Take 81 mg by mouth 2 (Two) Times a Day.     • atorvastatin (LIPITOR) 40 MG tablet TAKE 1 TABLET BY MOUTH EVERY NIGHT AT BEDTIME 30 tablet 0   • cetirizine (zyrTEC) 10 MG tablet Take 10 mg by mouth Daily.     • furosemide (LASIX) 20 MG tablet Take 1 tablet by mouth 2 (Two) Times a Day. 180 tablet 3   • losartan (COZAAR) 100 MG tablet Take 100 mg by mouth Daily.     • meloxicam (MOBIC) 15 MG tablet Take 15 mg by mouth Daily.     • metoprolol tartrate (LOPRESSOR) 50 MG tablet Take 50 mg by mouth 2 (Two) Times a Day.  12   • Multiple Vitamins-Minerals (MULTIVITAMIN ADULT PO) Take  by mouth Daily.     • nitroglycerin (NITROSTAT) 0.4 MG SL tablet Place 0.4 mg under the tongue Every 5 (Five) Minutes As Needed.     • Omega-3 Fatty Acids (EQL FISH OIL) 1200 MG capsule Take 1 tablet by mouth 2 (Two) Times a Day.     • potassium chloride 10 MEQ CR tablet Take 1 tablet by mouth Daily. 30 tablet 11   • warfarin (COUMADIN) 4 MG tablet Take 4 mg by mouth. Takes 6 mg M/W/Sat and 4 mg all other days     • warfarin (COUMADIN) 6 MG tablet TAKE 1 TABLET BY MOUTH EVERY WEDNESDAY (Patient taking  differently: Take 6 mg by mouth. M, W and Sat) 30 tablet 5     No current facility-administered medications on file prior to visit.       ALLERGIES  Patient has no known allergies.    HISTORY  Past Medical History:   Diagnosis Date   • Arterial insufficiency (CMS/HCC)    • Atherosclerosis of arteries of extremities (CMS/HCC)    • CAD (coronary artery disease)    • DVT (deep venous thrombosis) (CMS/HCC)     h.o - on coumadin    • Fatigue    • Hyperlipidemia    • Hypertension    • Palpitations    • PVD (peripheral vascular disease) (CMS/[facility])    • SOB (shortness of breath)        Social History     Socioeconomic History   • Marital status:      Spouse name: Not on file   • Number of children: Not on file   • Years of education: Not on file   • Highest education level: Not on file   Tobacco Use   • Smoking status: Current Every Day Smoker     Packs/day: 1.00     Types: Cigarettes   • Smokeless tobacco: Never Used   Substance and Sexual Activity   • Alcohol use: Never     Comment: Social    • Drug use: Never   • Sexual activity: Defer       Family History   Problem Relation Age of Onset   • Atrial fibrillation Mother    • Heart attack Father    • Diabetes Sister    • Hypertension Sister    • Stroke Maternal Grandmother    • Peripheral vascular disease Other        Review of Systems   Constitutional: Negative.  Negative for chills, diaphoresis, fatigue and fever.   HENT: Negative.    Eyes: Negative.  Negative for visual disturbance.   Respiratory: Positive for shortness of breath (with increaed activity) and wheezing (occas). Negative for chest tightness.    Cardiovascular: Positive for leg swelling. Negative for chest pain and palpitations.   Gastrointestinal: Negative.  Negative for blood in stool.   Endocrine: Negative.    Genitourinary: Negative.  Negative for hematuria.   Musculoskeletal: Positive for arthralgias and joint swelling (right knee). Negative for back pain, myalgias and neck pain.   Skin: Negative.  "   Allergic/Immunologic: Negative.    Neurological: Negative.  Negative for dizziness, syncope, weakness, light-headedness, numbness and headaches.   Hematological: Negative.  Does not bruise/bleed easily.   Psychiatric/Behavioral: Negative.  Negative for sleep disturbance.       Objective     VITALS: /63 (BP Location: Left arm, Patient Position: Sitting)   Pulse 76   Ht 167.6 cm (65.98\")   Wt 99.4 kg (219 lb 3.2 oz)   SpO2 96%   BMI 35.40 kg/m²     LABS:   Lab Results (most recent)     None          IMAGING:   No Images in the past 120 days found..    EXAM:  Physical Exam  Vitals and nursing note reviewed.   Constitutional:       Appearance: She is well-developed.   HENT:      Head: Normocephalic.   Eyes:      Pupils: Pupils are equal, round, and reactive to light.   Neck:      Thyroid: No thyroid mass.      Vascular: No carotid bruit or JVD.      Trachea: Trachea and phonation normal.   Cardiovascular:      Rate and Rhythm: Normal rate and regular rhythm.      Pulses:           Radial pulses are 2+ on the right side and 2+ on the left side.        Posterior tibial pulses are 2+ on the right side and 2+ on the left side.      Heart sounds: Murmur heard.   No friction rub. No gallop.    Pulmonary:      Effort: Pulmonary effort is normal. No respiratory distress.      Breath sounds: Normal breath sounds. No wheezing or rales.   Abdominal:      General: Bowel sounds are normal. There is no distension or abdominal bruit.      Palpations: Abdomen is soft.      Tenderness: There is no abdominal tenderness.   Musculoskeletal:         General: Swelling present. Normal range of motion.      Cervical back: Neck supple.   Skin:     General: Skin is warm and dry.      Capillary Refill: Capillary refill takes less than 2 seconds.      Findings: No rash.   Neurological:      Mental Status: She is alert and oriented to person, place, and time.      Cranial Nerves: No cranial nerve deficit.      Sensory: No sensory " deficit.   Psychiatric:         Speech: Speech normal.         Behavior: Behavior normal.         Thought Content: Thought content normal.         Judgment: Judgment normal.         Procedure   Procedures       Assessment/Plan    Diagnosis Plan   1. Right leg swelling  XR knee 3 vw right    CBC & Differential    Comprehensive Metabolic Panel    Lipid Panel    TSH    Magnesium    proBNP   2. Right leg pain  XR knee 3 vw right    methylPREDNISolone (MEDROL) 4 MG dose pack    CBC & Differential    Comprehensive Metabolic Panel    Lipid Panel    TSH    Magnesium    proBNP   3. Wheezing  methylPREDNISolone (MEDROL) 4 MG dose pack    CBC & Differential    Comprehensive Metabolic Panel    Lipid Panel    TSH    Magnesium    proBNP   4. PVD (peripheral vascular disease) (CMS/HCC)  Lipid Panel    TSH   5. Mixed hyperlipidemia  Lipid Panel   6. Chronic fatigue  TSH   1.  1.  Patient complains of major complaint being right knee pain and swelling without injury.  I do think it is appropriate to get a x-ray of the right knee we have previously done ultrasounds to rule out any claudication or DVT-like symptoms.  Both studies were negative.  The pain is continued therefore I think it is appropriate to get an x-ray to look for other potential causes of her knee pain.  2.  Patient does have chronic shortness of breath and tobacco habituation.  She does have associated wheezing.  Due to the combination of her knee pain and shortness of breath I like patients patient on a Medrol Dosepak to see if we can remedy her symptoms.  3.  I will also like to do a extensive laboratory work-up including CBC, S BMP, TSH, magnesium, proBNP, and a lipid panel.  4.  Informed of signs and symptoms of ACS and advised to seek emergent treatment for any new worsening symptoms.  Patient also advised sooner follow-up as needed.  Also advised to follow-up with family doctor as needed  This note is dictated utilizing voice recognition software.  Although  this record has been proof read, transcriptional errors may still be present. If questions occur regarding the content of this record please do not hesitate to call our office.  I have reviewed and confirmed the accuracy of the ROS as documented by the MA/LPN/RN JAMAAL Salazar    Return in about 6 months (around 12/9/2021), or if symptoms worsen or fail to improve.    Diagnoses and all orders for this visit:    1. Right leg swelling (Primary)  -     XR knee 3 vw right; Future  -     CBC & Differential; Future  -     Comprehensive Metabolic Panel; Future  -     Lipid Panel; Future  -     TSH; Future  -     Magnesium; Future  -     proBNP; Future    2. Right leg pain  -     XR knee 3 vw right; Future  -     methylPREDNISolone (MEDROL) 4 MG dose pack; Take as directed on package instructions.  Dispense: 21 each; Refill: 0  -     CBC & Differential; Future  -     Comprehensive Metabolic Panel; Future  -     Lipid Panel; Future  -     TSH; Future  -     Magnesium; Future  -     proBNP; Future    3. Wheezing  -     methylPREDNISolone (MEDROL) 4 MG dose pack; Take as directed on package instructions.  Dispense: 21 each; Refill: 0  -     CBC & Differential; Future  -     Comprehensive Metabolic Panel; Future  -     Lipid Panel; Future  -     TSH; Future  -     Magnesium; Future  -     proBNP; Future    4. PVD (peripheral vascular disease) (CMS/HCC)  -     Lipid Panel; Future  -     TSH; Future    5. Mixed hyperlipidemia  -     Lipid Panel; Future    6. Chronic fatigue  -     TSH; Future               MEDS ORDERED DURING VISIT:  New Medications Ordered This Visit   Medications   • methylPREDNISolone (MEDROL) 4 MG dose pack     Sig: Take as directed on package instructions.     Dispense:  21 each     Refill:  0           This document has been electronically signed by JAMAAL Salazar Jr.  June 9, 2021 13:02 EDT

## 2021-06-09 NOTE — PATIENT INSTRUCTIONS
Acute Coronary Syndrome  Acute coronary syndrome (ACS) is a serious problem in which there is suddenly not enough blood and oxygen reaching the heart. ACS can result in chest pain or a heart attack.  This condition is a medical emergency. If you have any symptoms of this condition, get help right away.  What are the causes?  This condition may be caused by:  · A buildup of fat and cholesterol inside the arteries (atherosclerosis). This is the most common cause. The buildup (plaque) can cause blood vessels in the heart (coronary arteries) to become narrow or blocked, which reduces blood flow to the heart. Plaque can also break off and lead to a clot, which can block an artery and cause a heart attack or stroke.  · Sudden tightening of the muscles around the coronary arteries (coronary spasm).  · Tearing of a coronary artery (spontaneous coronary artery dissection).  · Very low blood pressure (hypotension).  · An abnormal heartbeat (arrhythmia).  · Other medical conditions that cause a decrease of oxygen to the heart, such as anemiaorrespiratory failure.  · Using cocaine or methamphetamine.  What increases the risk?  The following factors may make you more likely to develop this condition:  · Age. The risk for ACS increases as you get older.  · History of chest pain, heart attack, peripheral artery disease, or stroke.  · Having taken chemotherapy or immune-suppressing medicines.  · Being male.  · Family history of chest pain, heart disease, or stroke.  · Smoking.  · Not exercising enough.  · Being overweight.  · High cholesterol.  · High blood pressure (hypertension).  · Diabetes.  · Excessive alcohol use.  What are the signs or symptoms?  Common symptoms of this condition include:  · Chest pain. The pain may last a long time, or it may stop and come back (recur). It may feel like:  ? Crushing or squeezing.  ? Tightness, pressure, fullness, or heaviness.  · Arm, neck, jaw, or back pain.  · Heartburn or  indigestion.  · Shortness of breath.  · Nausea.  · Sudden cold sweats.  · Light-headedness.  · Dizziness or passing out.  · Tiredness (fatigue).  Sometimes there are no symptoms.  How is this diagnosed?  This condition may be diagnosed based on:  · Your medical history and symptoms.  · Imaging tests, such as:  ? An electrocardiogram (ECG). This measures the heart's electrical activity.  ? X-rays.  ? CT scan.  ? A coronary angiogram. For this test, dye is injected into the heart arteries and then X-rays are taken.  ? Myocardial perfusion imaging. This test shows how well blood flows through your heart muscle.  · Blood tests. These may be repeated at certain time intervals.  · Exercise stress testing.  · Echocardiogram. This is a test that uses sound waves to produce detailed images of the heart.  How is this treated?  Treatment for this condition may include:  · Oxygen therapy.  · Medicines, such as:  ? Antiplatelet medicines and blood-thinning medicines, such as aspirin. These help prevent blood clots.  ? Medicine that dissolves any blood clots (fibrinolytic therapy).  ? Blood pressure medicines.  ? Nitroglycerin. This helps widen blood vessels to improve blood flow.  ? Pain medicine.  ? Cholesterol-lowering medicine.  · Surgery, such as:  ? Coronary angioplasty with stent placement. This involves placing a small piece of metal that looks like mesh or a spring into a narrow coronary artery. This widens the artery and keeps it open.  ? Coronary artery bypass surgery. This involves taking a section of a blood vessel from a different part of your body and placing it on the blocked coronary artery to allow blood to flow around the blockage.  · Cardiac rehabilitation. This is a program that includes exercise training, education, and counseling to help you recover.  Follow these instructions at home:  Eating and drinking  · Eat a heart-healthy diet that includes whole grains, fruits and vegetables, lean proteins, and  low-fat or nonfat dairy products.  · Limit how much salt (sodium) you eat as told by your health care provider. Follow instructions from your health care provider about any other eating or drinking restrictions, such as limiting foods that are high in fat and processed sugars.  · Use healthy cooking methods such as roasting, grilling, broiling, baking, poaching, steaming, or stir-frying.  · Work with a dietitian to follow a heart-healthy eating plan.  Medicines  · Take over-the-counter and prescription medicines only as told by your health care provider.  · Do not take these medicines unless your health care provider approves:  ? Vitamin supplements that contain vitamin A or vitamin E.  ? NSAIDs, such as ibuprofen, naproxen, or celecoxib.  ? Hormone replacement therapy that contains estrogen.  · If you are taking blood thinners:  ? Talk with your health care provider before you take any medicines that contain aspirin or NSAIDs. These medicines increase your risk for dangerous bleeding.  ? Take your medicine exactly as told, at the same time every day.  ? Avoid activities that could cause injury or bruising, and follow instructions about how to prevent falls.  ? Wear a medical alert bracelet, and carry a card that lists what medicines you take.  Activity  · Follow your cardiac rehabilitation program. Do exercises as told by your physical therapist.  · Ask your health care provider what activities and exercises are safe for you. Follow his or her instructions about lifting, driving, or climbing stairs.  Lifestyle  · Do not use any products that contain nicotine or tobacco, such as cigarettes, e-cigarettes, and chewing tobacco. If you need help quitting, ask your health care provider.  · Do not drink alcohol if your health care provider tells you not to drink.  · If you drink alcohol:  ? Limit how much you have to 0-1 drink a day.  ? Be aware of how much alcohol is in your drink. In the [place] one drink equals one 12 oz  bottle of beer (355 mL), one 5 oz glass of wine (148 mL), or one 1½ oz glass of hard liquor (44 mL).  · Maintain a healthy weight. If you need to lose weight, work with your health care provider to do so safely.  General instructions  · Tell all the health care providers who provide care for you about your heart condition, including your dentist. This may affect the medicines or treatment you receive.  · Manage any other health conditions you have, such as hypertension or diabetes. These conditions affect your heart.  · Pay attention to your mental health. You may be at higher risk for depression.  ? Find ways to manage stress.  ? Talk to your health care provider about depression screening and treatment.  · Keep your vaccinations up to date.  ? Get the flu shot (influenza vaccine) every year.  ? Get the pneumococcal vaccine if you are age 65 or older.  · If directed, monitor your blood pressure at home.  · Keep all follow-up visits as told by your health care provider. This is important.  Contact a health care provider if you:  · Feel overwhelmed or sad.  · Have trouble doing your daily activities.  Get help right away if you:  · Have pain in your chest, neck, arm, jaw, stomach, or back that recurs, and:  ? It lasts for more than a few minutes.  ? It is not relieved by taking the medicineyour health care provider prescribed.  · Have unexplained:  ? Heavy sweating.  ? Heartburn or indigestion.  ? Nausea or vomiting.  ? Shortness of breath.  ? Difficulty breathing.  ? Fatigue.  ? Nervousness or anxiety.  ? Weakness.  ? Diarrhea.  ? Dark stools or blood in your stool.  · Have sudden light-headedness or dizziness.  · Have blood pressure that is higher than 180/120.  · Faint.  · Have thoughts about hurting yourself.  These symptoms may represent a serious problem that is an emergency. Do not wait to see if the symptoms will go away. Get medical help right away. Call your local emergency services (911 in the [place] Do  not drive yourself to the hospital.   Summary  · Acute coronary syndrome (ACS) is when there is not enough blood and oxygen being supplied to the heart. ACS can result in chest pain or a heart attack.  · Acute coronary syndrome is a medical emergency. If you have any symptoms of this condition, get help right away.  · Treatment includes medicines and procedures to open the blocked arteries and restore blood flow.  This information is not intended to replace advice given to you by your health care provider. Make sure you discuss any questions you have with your health care provider.  Document Revised: 05/20/2020 Document Reviewed: 12/30/2019  [employer] Patient Education © 2021 [employer]      Hypertension, Adult  Hypertension is another name for high blood pressure. High blood pressure forces your heart to work harder to pump blood. This can cause problems over time.  There are two numbers in a blood pressure reading. There is a top number (systolic) over a bottom number (diastolic). It is best to have a blood pressure that is below 120/80. Healthy choices can help lower your blood pressure, or you may need medicine to help lower it.  What are the causes?  The cause of this condition is not known. Some conditions may be related to high blood pressure.  What increases the risk?  · Smoking.  · Having type 2 diabetes mellitus, high cholesterol, or both.  · Not getting enough exercise or physical activity.  · Being overweight.  · Having too much fat, sugar, calories, or salt (sodium) in your diet.  · Drinking too much alcohol.  · Having long-term (chronic) kidney disease.  · Having a family history of high blood pressure.  · Age. Risk increases with age.  · Race. You may be at higher risk if you are .  · Gender. Men are at higher risk than women before age 45. After age 65, women are at higher risk than men.  · Having obstructive sleep apnea.  · Stress.  What are the signs or symptoms?  · High blood  pressure may not cause symptoms. Very high blood pressure (hypertensive crisis) may cause:  ? Headache.  ? Feelings of worry or nervousness (anxiety).  ? Shortness of breath.  ? Nosebleed.  ? A feeling of being sick to your stomach (nausea).  ? Throwing up (vomiting).  ? Changes in how you see.  ? Very bad chest pain.  ? Seizures.  How is this treated?  · This condition is treated by making healthy lifestyle changes, such as:  ? Eating healthy foods.  ? Exercising more.  ? Drinking less alcohol.  · Your health care provider may prescribe medicine if lifestyle changes are not enough to get your blood pressure under control, and if:  ? Your top number is above 130.  ? Your bottom number is above 80.  · Your personal target blood pressure may vary.  Follow these instructions at home:  Eating and drinking    · If told, follow the DASH eating plan. To follow this plan:  ? Fill one half of your plate at each meal with fruits and vegetables.  ? Fill one fourth of your plate at each meal with whole grains. Whole grains include whole-wheat pasta, brown rice, and whole-grain bread.  ? Eat or drink low-fat dairy products, such as skim milk or low-fat yogurt.  ? Fill one fourth of your plate at each meal with low-fat (lean) proteins. Low-fat proteins include fish, chicken without skin, eggs, beans, and tofu.  ? Avoid fatty meat, cured and processed meat, or chicken with skin.  ? Avoid pre-made or processed food.  · Eat less than 1,500 mg of salt each day.  · Do not drink alcohol if:  ? Your doctor tells you not to drink.  ? You are pregnant, may be pregnant, or are planning to become pregnant.  · If you drink alcohol:  ? Limit how much you use to:  § 0-1 drink a day for women.  § 0-2 drinks a day for men.  ? Be aware of how much alcohol is in your drink. In the [place] one drink equals one 12 oz bottle of beer (355 mL), one 5 oz glass of wine (148 mL), or one 1½ oz glass of hard liquor (44 mL).  Lifestyle    · Work with your  doctor to stay at a healthy weight or to lose weight. Ask your doctor what the best weight is for you.  · Get at least 30 minutes of exercise most days of the week. This may include walking, swimming, or biking.  · Get at least 30 minutes of exercise that strengthens your muscles (resistance exercise) at least 3 days a week. This may include lifting weights or doing Pilates.  · Do not use any products that contain nicotine or tobacco, such as cigarettes, e-cigarettes, and chewing tobacco. If you need help quitting, ask your doctor.  · Check your blood pressure at home as told by your doctor.  · Keep all follow-up visits as told by your doctor. This is important.  Medicines  · Take over-the-counter and prescription medicines only as told by your doctor. Follow directions carefully.  · Do not skip doses of blood pressure medicine. The medicine does not work as well if you skip doses. Skipping doses also puts you at risk for problems.  · Ask your doctor about side effects or reactions to medicines that you should watch for.  Contact a doctor if you:  · Think you are having a reaction to the medicine you are taking.  · Have headaches that keep coming back (recurring).  · Feel dizzy.  · Have swelling in your ankles.  · Have trouble with your vision.  Get help right away if you:  · Get a very bad headache.  · Start to feel mixed up (confused).  · Feel weak or numb.  · Feel faint.  · Have very bad pain in your:  ? Chest.  ? Belly (abdomen).  · Throw up more than once.  · Have trouble breathing.  Summary  · Hypertension is another name for high blood pressure.  · High blood pressure forces your heart to work harder to pump blood.  · For most people, a normal blood pressure is less than 120/80.  · Making healthy choices can help lower blood pressure. If your blood pressure does not get lower with healthy choices, you may need to take medicine.  This information is not intended to replace advice given to you by your health  care provider. Make sure you discuss any questions you have with your health care provider.  Document Revised: 08/28/2019 Document Reviewed: 08/28/2019  [employer] Patient Education © 2021 [employer]

## 2021-08-04 ENCOUNTER — ANTICOAGULATION VISIT (OUTPATIENT)
Dept: CARDIOLOGY | Facility: CLINIC | Age: 56
End: 2021-08-04

## 2021-08-04 LAB — INR PPP: 3.66 (ref 2–3)

## 2021-08-04 NOTE — PROGRESS NOTES
PER TIERA GARCIA, PAC ONLY TAKE 3 MG TODAY THEN RESUME PREVIOUS DOSING AND RECHECK LEVEL IN 1 MO. . VERBAL ORDERS READ BACK AND VERIFIED BY RONALDO VARELA. PATIENT AWARE, VERBALIZED OK. PH,LPN

## 2021-09-09 ENCOUNTER — ANTICOAGULATION VISIT (OUTPATIENT)
Dept: CARDIOLOGY | Facility: CLINIC | Age: 56
End: 2021-09-09

## 2021-09-09 LAB — INR PPP: 3.43 (ref 2–3)

## 2021-10-28 ENCOUNTER — TELEPHONE (OUTPATIENT)
Dept: CARDIOLOGY | Facility: CLINIC | Age: 56
End: 2021-10-28

## 2021-10-28 DIAGNOSIS — I82.409 ACUTE DEEP VEIN THROMBOSIS (DVT) OF LOWER EXTREMITY, UNSPECIFIED LATERALITY, UNSPECIFIED VEIN (HCC): Primary | ICD-10-CM

## 2021-10-28 NOTE — TELEPHONE ENCOUNTER
CURRENT INR STANDING ORDER EXPIRES 11-5-2021. V/O PER TIERA GARCIA, PAC OK TO SEND NEW STANDING ORDER. ORDER FAXED TO [facility]. PH,LPN

## 2021-12-09 ENCOUNTER — OFFICE VISIT (OUTPATIENT)
Dept: CARDIOLOGY | Facility: CLINIC | Age: 56
End: 2021-12-09

## 2021-12-09 VITALS
HEART RATE: 84 BPM | DIASTOLIC BLOOD PRESSURE: 66 MMHG | HEIGHT: 66 IN | SYSTOLIC BLOOD PRESSURE: 156 MMHG | BODY MASS INDEX: 34.36 KG/M2 | WEIGHT: 213.8 LBS | OXYGEN SATURATION: 96 %

## 2021-12-09 DIAGNOSIS — Z86.718 HISTORY OF DEEP VEIN THROMBOSIS (DVT) OF LOWER EXTREMITY: ICD-10-CM

## 2021-12-09 DIAGNOSIS — Z79.01 ANTICOAGULATED ON COUMADIN: ICD-10-CM

## 2021-12-09 DIAGNOSIS — M79.89 RIGHT LEG SWELLING: ICD-10-CM

## 2021-12-09 DIAGNOSIS — I51.89 GRADE II DIASTOLIC DYSFUNCTION: ICD-10-CM

## 2021-12-09 DIAGNOSIS — J06.9 UPPER RESPIRATORY TRACT INFECTION, UNSPECIFIED TYPE: Primary | ICD-10-CM

## 2021-12-09 DIAGNOSIS — I10 ESSENTIAL HYPERTENSION: ICD-10-CM

## 2021-12-09 DIAGNOSIS — I10 PRIMARY HYPERTENSION: ICD-10-CM

## 2021-12-09 DIAGNOSIS — E78.2 MIXED HYPERLIPIDEMIA: ICD-10-CM

## 2021-12-09 PROCEDURE — 99214 OFFICE O/P EST MOD 30 MIN: CPT | Performed by: NURSE PRACTITIONER

## 2021-12-09 RX ORDER — POTASSIUM CHLORIDE 750 MG/1
10 TABLET, FILM COATED, EXTENDED RELEASE ORAL DAILY
Qty: 90 TABLET | Refills: 3 | Status: SHIPPED | OUTPATIENT
Start: 2021-12-09 | End: 2022-04-20 | Stop reason: SDUPTHER

## 2021-12-09 RX ORDER — AMLODIPINE BESYLATE 10 MG/1
10 TABLET ORAL DAILY
Qty: 90 TABLET | Refills: 3 | Status: SHIPPED | OUTPATIENT
Start: 2021-12-09

## 2021-12-09 RX ORDER — ATORVASTATIN CALCIUM 40 MG/1
40 TABLET, FILM COATED ORAL
Qty: 90 TABLET | Refills: 3 | Status: SHIPPED | OUTPATIENT
Start: 2021-12-09

## 2021-12-09 RX ORDER — FUROSEMIDE 20 MG/1
20 TABLET ORAL 2 TIMES DAILY
Qty: 180 TABLET | Refills: 3 | Status: SHIPPED | OUTPATIENT
Start: 2021-12-09

## 2021-12-09 RX ORDER — METOPROLOL TARTRATE 50 MG/1
50 TABLET, FILM COATED ORAL 2 TIMES DAILY
Qty: 90 TABLET | Refills: 3 | Status: SHIPPED | OUTPATIENT
Start: 2021-12-09 | End: 2022-07-25 | Stop reason: SDUPTHER

## 2021-12-09 RX ORDER — BROMPHENIRAMINE MALEATE, PSEUDOEPHEDRINE HYDROCHLORIDE, AND DEXTROMETHORPHAN HYDROBROMIDE 2; 30; 10 MG/5ML; MG/5ML; MG/5ML
10 SYRUP ORAL 4 TIMES DAILY PRN
Qty: 473 ML | Refills: 0 | Status: SHIPPED | OUTPATIENT
Start: 2021-12-09 | End: 2022-07-11

## 2021-12-09 NOTE — PROGRESS NOTES
Subjective     Vannessa Contreras is a 56 y.o. female who presents to day for Hypertension (presents for 6 month f/u).    CHIEF COMPLIANT  Chief Complaint   Patient presents with   • Hypertension     presents for 6 month f/u       Active Problems:  Problem List Items Addressed This Visit        Cardiac and Vasculature    Hyperlipidemia    Relevant Medications    atorvastatin (LIPITOR) 40 MG tablet    potassium chloride 10 MEQ CR tablet    Other Relevant Orders    CBC & Differential    Lipid Panel    TSH    Magnesium    Comprehensive Metabolic Panel    Protime-INR Coumadin YES  (Warfarin) Therapy    Hypertension    Relevant Medications    amLODIPine (NORVASC) 10 MG tablet    metoprolol tartrate (LOPRESSOR) 50 MG tablet    furosemide (LASIX) 20 MG tablet    potassium chloride 10 MEQ CR tablet    Other Relevant Orders    CBC & Differential    Lipid Panel    TSH    Magnesium    Comprehensive Metabolic Panel    Protime-INR Coumadin YES  (Warfarin) Therapy    Essential hypertension    Relevant Medications    amLODIPine (NORVASC) 10 MG tablet    metoprolol tartrate (LOPRESSOR) 50 MG tablet    furosemide (LASIX) 20 MG tablet    potassium chloride 10 MEQ CR tablet    Other Relevant Orders    CBC & Differential    Lipid Panel    TSH    Magnesium    Comprehensive Metabolic Panel    Protime-INR Coumadin YES  (Warfarin) Therapy      Other Visit Diagnoses     Upper respiratory tract infection, unspecified type    -  Primary    Relevant Medications    brompheniramine-pseudoephedrine-DM 30-2-10 MG/5ML syrup    potassium chloride 10 MEQ CR tablet    Other Relevant Orders    CBC & Differential    Lipid Panel    TSH    Magnesium    Comprehensive Metabolic Panel    Protime-INR Coumadin YES  (Warfarin) Therapy    Grade II diastolic dysfunction        Relevant Medications    furosemide (LASIX) 20 MG tablet    potassium chloride 10 MEQ CR tablet    Other Relevant Orders    CBC & Differential    Lipid Panel    TSH    Magnesium     Comprehensive Metabolic Panel    Protime-INR Coumadin YES  (Warfarin) Therapy    Right leg swelling        Relevant Medications    potassium chloride 10 MEQ CR tablet    Other Relevant Orders    CBC & Differential    Lipid Panel    TSH    Magnesium    Comprehensive Metabolic Panel    Protime-INR Coumadin YES  (Warfarin) Therapy    Anticoagulated on Coumadin        Relevant Orders    Protime-INR Coumadin YES  (Warfarin) Therapy          HPI  HPI  Ms. Tabares is a 56-year-old female patient who is being seen today for chronic arterial hypertension peripheral vascular disease.  Patient does have chronic arterial hypertension in which she is on amlodipine, losartan, and metoprolol.  Today her blood pressure is elevated 156/66 heart rate of 84.  She denies any associated symptoms with her chronic arterial hypertension.  She does have a history of peripheral vascular disease in which she has had bypass in the past.  She is on Coumadin for this and denies any signs or symptoms of bleeding.  Her major concern today is her upper respiratory infection which she has persistent coughing shortness of breath and wheezing.  This also leads to a headache.  Otherwise she is doing well from a cardiovascular standpoint denies any chest pain, lower extremity edema, palpitations, fatigue, dizziness, lightheadedness, syncope, PND, orthopnea, or strokelike symptoms.  PRIOR MEDS  Current Outpatient Medications on File Prior to Visit   Medication Sig Dispense Refill   • aspirin (ASPIRIN LOW DOSE) 81 MG EC tablet Take 81 mg by mouth 2 (Two) Times a Day.     • cetirizine (zyrTEC) 10 MG tablet Take 10 mg by mouth Daily.     • losartan (COZAAR) 100 MG tablet Take 100 mg by mouth Daily.     • meloxicam (MOBIC) 15 MG tablet Take 15 mg by mouth Daily.     • Multiple Vitamins-Minerals (MULTIVITAMIN ADULT PO) Take  by mouth Daily.     • nitroglycerin (NITROSTAT) 0.4 MG SL tablet Place 0.4 mg under the tongue Every 5 (Five) Minutes As Needed.     •  Omega-3 Fatty Acids (EQL FISH OIL) 1200 MG capsule Take 1 tablet by mouth 2 (Two) Times a Day.     • warfarin (COUMADIN) 4 MG tablet Take 4 mg by mouth. Takes 6 mg M/W/Sat and 4 mg all other days     • warfarin (COUMADIN) 6 MG tablet TAKE 1 TABLET BY MOUTH EVERY WEDNESDAY (Patient taking differently: Take 6 mg by mouth. M, W and Sat) 30 tablet 5   • [DISCONTINUED] amLODIPine (NORVASC) 5 MG tablet Take 1 tablet by mouth Daily. 90 tablet 3   • [DISCONTINUED] atorvastatin (LIPITOR) 40 MG tablet TAKE 1 TABLET BY MOUTH EVERY NIGHT AT BEDTIME 30 tablet 0   • [DISCONTINUED] furosemide (LASIX) 20 MG tablet Take 1 tablet by mouth 2 (Two) Times a Day. 180 tablet 3   • [DISCONTINUED] metoprolol tartrate (LOPRESSOR) 50 MG tablet Take 50 mg by mouth 2 (Two) Times a Day.  12   • [DISCONTINUED] potassium chloride 10 MEQ CR tablet Take 1 tablet by mouth Daily. 30 tablet 11   • [DISCONTINUED] methylPREDNISolone (MEDROL) 4 MG dose pack Take as directed on package instructions. 21 each 0     No current facility-administered medications on file prior to visit.       ALLERGIES  Patient has no known allergies.    HISTORY  Past Medical History:   Diagnosis Date   • Arterial insufficiency (HCC)    • Atherosclerosis of arteries of extremities ([facility])    • CAD (coronary artery disease)    • DVT (deep venous thrombosis) ([facility])     h.o - on coumadin    • Fatigue    • Hyperlipidemia    • Hypertension    • Palpitations    • PVD (peripheral vascular disease) ([facility])    • SOB (shortness of breath)        Social History     Socioeconomic History   • Marital status:    Tobacco Use   • Smoking status: Current Every Day Smoker     Packs/day: 1.00     Types: Cigarettes   • Smokeless tobacco: Never Used   Substance and Sexual Activity   • Alcohol use: Never     Comment: Social    • Drug use: Never   • Sexual activity: Defer       Family History   Problem Relation Age of Onset   • Atrial fibrillation Mother    • Heart attack Father    • Diabetes Sister  "   • Hypertension Sister    • Stroke Maternal Grandmother    • Peripheral vascular disease Other        Review of Systems   Constitutional: Negative.  Negative for chills, diaphoresis, fatigue and fever.   Eyes: Negative.  Negative for visual disturbance.   Respiratory: Positive for cough, shortness of breath and wheezing. Negative for apnea and chest tightness.         Respiratory infection   Cardiovascular: Negative.  Negative for chest pain, palpitations and leg swelling.   Gastrointestinal: Negative.  Negative for abdominal pain, blood in stool, constipation, diarrhea, nausea and vomiting.   Endocrine: Negative.    Genitourinary: Negative.  Negative for hematuria.   Musculoskeletal: Positive for arthralgias. Negative for back pain and myalgias.   Skin: Negative.    Allergic/Immunologic: Negative.    Neurological: Positive for headaches (coughing spells). Negative for dizziness, syncope, weakness, light-headedness and numbness.   Hematological: Negative.  Does not bruise/bleed easily.   Psychiatric/Behavioral: Negative.  Negative for sleep disturbance.       Objective     VITALS: /66 (BP Location: Left arm, Patient Position: Sitting)   Pulse 84   Ht 167.6 cm (65.98\")   Wt 97 kg (213 lb 12.8 oz)   SpO2 96%   BMI 34.52 kg/m²     LABS:   Lab Results (most recent)     None          IMAGING:   No Images in the past 120 days found..    EXAM:  Physical Exam  Vitals and nursing note reviewed.   Constitutional:       Appearance: She is well-developed.   Eyes:      Pupils: Pupils are equal, round, and reactive to light.   Neck:      Thyroid: No thyroid mass.      Vascular: No carotid bruit or JVD.      Trachea: Trachea and phonation normal.   Cardiovascular:      Rate and Rhythm: Normal rate and regular rhythm.      Pulses:           Radial pulses are 2+ on the right side and 2+ on the left side.        Dorsalis pedis pulses are 2+ on the right side and 2+ on the left side.        Posterior tibial pulses are 2+ " on the right side and 2+ on the left side.      Heart sounds: Murmur heard.   No friction rub. No gallop.    Pulmonary:      Effort: Pulmonary effort is normal. No respiratory distress.      Breath sounds: Wheezing present. No rales.   Abdominal:      General: Bowel sounds are normal.      Palpations: Abdomen is soft.   Musculoskeletal:         General: Normal range of motion.      Cervical back: Neck supple.   Skin:     General: Skin is warm and dry.      Capillary Refill: Capillary refill takes less than 2 seconds.      Findings: No rash.   Neurological:      Mental Status: She is alert and oriented to person, place, and time.   Psychiatric:         Speech: Speech normal.         Behavior: Behavior normal.         Thought Content: Thought content normal.         Judgment: Judgment normal.         Procedure   Procedures       Assessment/Plan    Diagnosis Plan   1. Upper respiratory tract infection, unspecified type  brompheniramine-pseudoephedrine-DM 30-2-10 MG/5ML syrup    potassium chloride 10 MEQ CR tablet    CBC & Differential    Lipid Panel    TSH    Magnesium    Comprehensive Metabolic Panel    Protime-INR Coumadin YES  (Warfarin) Therapy   2. Essential hypertension  amLODIPine (NORVASC) 10 MG tablet    potassium chloride 10 MEQ CR tablet    CBC & Differential    Lipid Panel    TSH    Magnesium    Comprehensive Metabolic Panel    Protime-INR Coumadin YES  (Warfarin) Therapy   3. Primary hypertension  metoprolol tartrate (LOPRESSOR) 50 MG tablet    potassium chloride 10 MEQ CR tablet    CBC & Differential    Lipid Panel    TSH    Magnesium    Comprehensive Metabolic Panel    Protime-INR Coumadin YES  (Warfarin) Therapy   4. Mixed hyperlipidemia  atorvastatin (LIPITOR) 40 MG tablet    potassium chloride 10 MEQ CR tablet    CBC & Differential    Lipid Panel    TSH    Magnesium    Comprehensive Metabolic Panel    Protime-INR Coumadin YES  (Warfarin) Therapy   5. Grade II diastolic dysfunction  furosemide (LASIX)  20 MG tablet    potassium chloride 10 MEQ CR tablet    CBC & Differential    Lipid Panel    TSH    Magnesium    Comprehensive Metabolic Panel    Protime-INR Coumadin YES  (Warfarin) Therapy   6. Right leg swelling  potassium chloride 10 MEQ CR tablet    CBC & Differential    Lipid Panel    TSH    Magnesium    Comprehensive Metabolic Panel    Protime-INR Coumadin YES  (Warfarin) Therapy   7. Anticoagulated on Coumadin  Protime-INR Coumadin YES  (Warfarin) Therapy   1.  Patient does have an upper respiratory infection which she has slight wheezing in her lung fields.  Persistent cough which makes her short of breath.  Therefore I like to prescribe her Bromfed.  She was informed to monitor blood pressure.  2.  Patient does have peripheral vascular disease on statins and Coumadin.  I like to do an extensive laboratory work-up to monitor her renal function, liver function, and a CBC for infection.  3.  Patient does have some lower extremity edema in which she has been using the extra Lasix at least 4 times a week.  Therefore I did go ahead increase her Lasix dosage to 20 mg twice daily on a routine basis.  4.  Patient does have an elevated blood pressure today at 156/66 heart rate of 84.  Therefore I like to increase her amlodipine to 10 mg daily.  She will monitor for increased swelling and her blood pressure on a routine basis report any significant highs or lows.  5.  Informed of signs and symptoms of ACS and advised to seek emergent treatment for any new worsening symptoms.  Patient also advised sooner follow-up as needed.  Also advised to follow-up with family doctor as needed  This note is dictated utilizing voice recognition software.  Although this record has been proof read, transcriptional errors may still be present. If questions occur regarding the content of this record please do not hesitate to call our office.  I have reviewed and confirmed the accuracy of the ROS as documented by the MA/LPN/RN Rush  JAMAAL Cruz    Return in about 6 months (around 6/9/2022), or if symptoms worsen or fail to improve.    Diagnoses and all orders for this visit:    1. Upper respiratory tract infection, unspecified type (Primary)  -     brompheniramine-pseudoephedrine-DM 30-2-10 MG/5ML syrup; Take 10 mL by mouth 4 (Four) Times a Day As Needed for Congestion, Cough or Allergies.  Dispense: 473 mL; Refill: 0  -     potassium chloride 10 MEQ CR tablet; Take 1 tablet by mouth Daily.  Dispense: 90 tablet; Refill: 3  -     CBC & Differential; Future  -     Lipid Panel; Future  -     TSH; Future  -     Magnesium; Future  -     Comprehensive Metabolic Panel; Future  -     Protime-INR Coumadin YES  (Warfarin) Therapy; Standing    2. Essential hypertension  -     amLODIPine (NORVASC) 10 MG tablet; Take 1 tablet by mouth Daily.  Dispense: 90 tablet; Refill: 3  -     potassium chloride 10 MEQ CR tablet; Take 1 tablet by mouth Daily.  Dispense: 90 tablet; Refill: 3  -     CBC & Differential; Future  -     Lipid Panel; Future  -     TSH; Future  -     Magnesium; Future  -     Comprehensive Metabolic Panel; Future  -     Protime-INR Coumadin YES  (Warfarin) Therapy; Standing    3. Primary hypertension  -     metoprolol tartrate (LOPRESSOR) 50 MG tablet; Take 1 tablet by mouth 2 (Two) Times a Day.  Dispense: 90 tablet; Refill: 3  -     potassium chloride 10 MEQ CR tablet; Take 1 tablet by mouth Daily.  Dispense: 90 tablet; Refill: 3  -     CBC & Differential; Future  -     Lipid Panel; Future  -     TSH; Future  -     Magnesium; Future  -     Comprehensive Metabolic Panel; Future  -     Protime-INR Coumadin YES  (Warfarin) Therapy; Standing    4. Mixed hyperlipidemia  -     atorvastatin (LIPITOR) 40 MG tablet; Take 1 tablet by mouth every night at bedtime.  Dispense: 90 tablet; Refill: 3  -     potassium chloride 10 MEQ CR tablet; Take 1 tablet by mouth Daily.  Dispense: 90 tablet; Refill: 3  -     CBC & Differential; Future  -     Lipid Panel;  Future  -     TSH; Future  -     Magnesium; Future  -     Comprehensive Metabolic Panel; Future  -     Protime-INR Coumadin YES  (Warfarin) Therapy; Standing    5. Grade II diastolic dysfunction  -     furosemide (LASIX) 20 MG tablet; Take 1 tablet by mouth 2 (Two) Times a Day.  Dispense: 180 tablet; Refill: 3  -     potassium chloride 10 MEQ CR tablet; Take 1 tablet by mouth Daily.  Dispense: 90 tablet; Refill: 3  -     CBC & Differential; Future  -     Lipid Panel; Future  -     TSH; Future  -     Magnesium; Future  -     Comprehensive Metabolic Panel; Future  -     Protime-INR Coumadin YES  (Warfarin) Therapy; Standing    6. Right leg swelling  -     potassium chloride 10 MEQ CR tablet; Take 1 tablet by mouth Daily.  Dispense: 90 tablet; Refill: 3  -     CBC & Differential; Future  -     Lipid Panel; Future  -     TSH; Future  -     Magnesium; Future  -     Comprehensive Metabolic Panel; Future  -     Protime-INR Coumadin YES  (Warfarin) Therapy; Standing    7. Anticoagulated on Coumadin  -     Protime-INR Coumadin YES  (Warfarin) Therapy; Standing                 MEDS ORDERED DURING VISIT:  New Medications Ordered This Visit   Medications   • brompheniramine-pseudoephedrine-DM 30-2-10 MG/5ML syrup     Sig: Take 10 mL by mouth 4 (Four) Times a Day As Needed for Congestion, Cough or Allergies.     Dispense:  473 mL     Refill:  0   • amLODIPine (NORVASC) 10 MG tablet     Sig: Take 1 tablet by mouth Daily.     Dispense:  90 tablet     Refill:  3   • atorvastatin (LIPITOR) 40 MG tablet     Sig: Take 1 tablet by mouth every night at bedtime.     Dispense:  90 tablet     Refill:  3   • metoprolol tartrate (LOPRESSOR) 50 MG tablet     Sig: Take 1 tablet by mouth 2 (Two) Times a Day.     Dispense:  90 tablet     Refill:  3   • furosemide (LASIX) 20 MG tablet     Sig: Take 1 tablet by mouth 2 (Two) Times a Day.     Dispense:  180 tablet     Refill:  3   • potassium chloride 10 MEQ CR tablet     Sig: Take 1 tablet by  mouth Daily.     Dispense:  90 tablet     Refill:  3           This document has been electronically signed by Rush Cruz Jr., APRN  December 9, 2021 09:00 EST

## 2021-12-09 NOTE — PATIENT INSTRUCTIONS
Acute Coronary Syndrome  Acute coronary syndrome (ACS) is a serious problem in which there is suddenly not enough blood and oxygen reaching the heart. ACS can result in chest pain or a heart attack.  This condition is a medical emergency. If you have any symptoms of this condition, get help right away.  What are the causes?  This condition may be caused by:  · A buildup of fat and cholesterol inside the arteries (atherosclerosis). This is the most common cause. The buildup (plaque) can cause blood vessels in the heart (coronary arteries) to become narrow or blocked, which reduces blood flow to the heart. Plaque can also break off and lead to a clot, which can block an artery and cause a heart attack or stroke.  · Sudden tightening of the muscles around the coronary arteries (coronary spasm).  · Tearing of a coronary artery (spontaneous coronary artery dissection).  · Very low blood pressure (hypotension).  · An abnormal heartbeat (arrhythmia).  · Other medical conditions that cause a decrease of oxygen to the heart, such as anemiaorrespiratory failure.  · Using cocaine or methamphetamine.  What increases the risk?  The following factors may make you more likely to develop this condition:  · Age. The risk for ACS increases as you get older.  · History of chest pain, heart attack, peripheral artery disease, or stroke.  · Having taken chemotherapy or immune-suppressing medicines.  · Being male.  · Family history of chest pain, heart disease, or stroke.  · Smoking.  · Not exercising enough.  · Being overweight.  · High cholesterol.  · High blood pressure (hypertension).  · Diabetes.  · Excessive alcohol use.  What are the signs or symptoms?  Common symptoms of this condition include:  · Chest pain. The pain may last a long time, or it may stop and come back (recur). It may feel like:  ? Crushing or squeezing.  ? Tightness, pressure, fullness, or heaviness.  · Arm, neck, jaw, or back pain.  · Heartburn or  indigestion.  · Shortness of breath.  · Nausea.  · Sudden cold sweats.  · Light-headedness.  · Dizziness or passing out.  · Tiredness (fatigue).  Sometimes there are no symptoms.  How is this diagnosed?  This condition may be diagnosed based on:  · Your medical history and symptoms.  · Imaging tests, such as:  ? An electrocardiogram (ECG). This measures the heart's electrical activity.  ? X-rays.  ? CT scan.  ? A coronary angiogram. For this test, dye is injected into the heart arteries and then X-rays are taken.  ? Myocardial perfusion imaging. This test shows how well blood flows through your heart muscle.  · Blood tests. These may be repeated at certain time intervals.  · Exercise stress testing.  · Echocardiogram. This is a test that uses sound waves to produce detailed images of the heart.  How is this treated?  Treatment for this condition may include:  · Oxygen therapy.  · Medicines, such as:  ? Antiplatelet medicines and blood-thinning medicines, such as aspirin. These help prevent blood clots.  ? Medicine that dissolves any blood clots (fibrinolytic therapy).  ? Blood pressure medicines.  ? Nitroglycerin. This helps widen blood vessels to improve blood flow.  ? Pain medicine.  ? Cholesterol-lowering medicine.  · Surgery, such as:  ? Coronary angioplasty with stent placement. This involves placing a small piece of metal that looks like mesh or a spring into a narrow coronary artery. This widens the artery and keeps it open.  ? Coronary artery bypass surgery. This involves taking a section of a blood vessel from a different part of your body and placing it on the blocked coronary artery to allow blood to flow around the blockage.  · Cardiac rehabilitation. This is a program that includes exercise training, education, and counseling to help you recover.  Follow these instructions at home:  Eating and drinking  · Eat a heart-healthy diet that includes whole grains, fruits and vegetables, lean proteins, and  low-fat or nonfat dairy products.  · Limit how much salt (sodium) you eat as told by your health care provider. Follow instructions from your health care provider about any other eating or drinking restrictions, such as limiting foods that are high in fat and processed sugars.  · Use healthy cooking methods such as roasting, grilling, broiling, baking, poaching, steaming, or stir-frying.  · Work with a dietitian to follow a heart-healthy eating plan.  Medicines  · Take over-the-counter and prescription medicines only as told by your health care provider.  · Do not take these medicines unless your health care provider approves:  ? Vitamin supplements that contain vitamin A or vitamin E.  ? NSAIDs, such as ibuprofen, naproxen, or celecoxib.  ? Hormone replacement therapy that contains estrogen.  · If you are taking blood thinners:  ? Talk with your health care provider before you take any medicines that contain aspirin or NSAIDs. These medicines increase your risk for dangerous bleeding.  ? Take your medicine exactly as told, at the same time every day.  ? Avoid activities that could cause injury or bruising, and follow instructions about how to prevent falls.  ? Wear a medical alert bracelet, and carry a card that lists what medicines you take.  Activity  · Follow your cardiac rehabilitation program. Do exercises as told by your physical therapist.  · Ask your health care provider what activities and exercises are safe for you. Follow his or her instructions about lifting, driving, or climbing stairs.  Lifestyle  · Do not use any products that contain nicotine or tobacco, such as cigarettes, e-cigarettes, and chewing tobacco. If you need help quitting, ask your health care provider.  · Do not drink alcohol if your health care provider tells you not to drink.  · If you drink alcohol:  ? Limit how much you have to 0-1 drink a day.  ? Be aware of how much alcohol is in your drink. In the [place] one drink equals one 12 oz  bottle of beer (355 mL), one 5 oz glass of wine (148 mL), or one 1½ oz glass of hard liquor (44 mL).  · Maintain a healthy weight. If you need to lose weight, work with your health care provider to do so safely.  General instructions  · Tell all the health care providers who provide care for you about your heart condition, including your dentist. This may affect the medicines or treatment you receive.  · Manage any other health conditions you have, such as hypertension or diabetes. These conditions affect your heart.  · Pay attention to your mental health. You may be at higher risk for depression.  ? Find ways to manage stress.  ? Talk to your health care provider about depression screening and treatment.  · Keep your vaccinations up to date.  ? Get the flu shot (influenza vaccine) every year.  ? Get the pneumococcal vaccine if you are age 65 or older.  · If directed, monitor your blood pressure at home.  · Keep all follow-up visits as told by your health care provider. This is important.  Contact a health care provider if you:  · Feel overwhelmed or sad.  · Have trouble doing your daily activities.  Get help right away if you:  · Have pain in your chest, neck, arm, jaw, stomach, or back that recurs, and:  ? It lasts for more than a few minutes.  ? It is not relieved by taking the medicineyour health care provider prescribed.  · Have unexplained:  ? Heavy sweating.  ? Heartburn or indigestion.  ? Nausea or vomiting.  ? Shortness of breath.  ? Difficulty breathing.  ? Fatigue.  ? Nervousness or anxiety.  ? Weakness.  ? Diarrhea.  ? Dark stools or blood in your stool.  · Have sudden light-headedness or dizziness.  · Have blood pressure that is higher than 180/120.  · Faint.  · Have thoughts about hurting yourself.  These symptoms may represent a serious problem that is an emergency. Do not wait to see if the symptoms will go away. Get medical help right away. Call your local emergency services (911 in the [place] Do  not drive yourself to the hospital.   Summary  · Acute coronary syndrome (ACS) is when there is not enough blood and oxygen being supplied to the heart. ACS can result in chest pain or a heart attack.  · Acute coronary syndrome is a medical emergency. If you have any symptoms of this condition, get help right away.  · Treatment includes medicines and procedures to open the blocked arteries and restore blood flow.  This information is not intended to replace advice given to you by your health care provider. Make sure you discuss any questions you have with your health care provider.  Document Revised: 05/20/2020 Document Reviewed: 12/30/2019  [employer] Patient Education © 2021 [employer]      Hypertension, Adult  Hypertension is another name for high blood pressure. High blood pressure forces your heart to work harder to pump blood. This can cause problems over time.  There are two numbers in a blood pressure reading. There is a top number (systolic) over a bottom number (diastolic). It is best to have a blood pressure that is below 120/80. Healthy choices can help lower your blood pressure, or you may need medicine to help lower it.  What are the causes?  The cause of this condition is not known. Some conditions may be related to high blood pressure.  What increases the risk?  · Smoking.  · Having type 2 diabetes mellitus, high cholesterol, or both.  · Not getting enough exercise or physical activity.  · Being overweight.  · Having too much fat, sugar, calories, or salt (sodium) in your diet.  · Drinking too much alcohol.  · Having long-term (chronic) kidney disease.  · Having a family history of high blood pressure.  · Age. Risk increases with age.  · Race. You may be at higher risk if you are .  · Gender. Men are at higher risk than women before age 45. After age 65, women are at higher risk than men.  · Having obstructive sleep apnea.  · Stress.  What are the signs or symptoms?  · High blood  pressure may not cause symptoms. Very high blood pressure (hypertensive crisis) may cause:  ? Headache.  ? Feelings of worry or nervousness (anxiety).  ? Shortness of breath.  ? Nosebleed.  ? A feeling of being sick to your stomach (nausea).  ? Throwing up (vomiting).  ? Changes in how you see.  ? Very bad chest pain.  ? Seizures.  How is this treated?  · This condition is treated by making healthy lifestyle changes, such as:  ? Eating healthy foods.  ? Exercising more.  ? Drinking less alcohol.  · Your health care provider may prescribe medicine if lifestyle changes are not enough to get your blood pressure under control, and if:  ? Your top number is above 130.  ? Your bottom number is above 80.  · Your personal target blood pressure may vary.  Follow these instructions at home:  Eating and drinking    · If told, follow the DASH eating plan. To follow this plan:  ? Fill one half of your plate at each meal with fruits and vegetables.  ? Fill one fourth of your plate at each meal with whole grains. Whole grains include whole-wheat pasta, brown rice, and whole-grain bread.  ? Eat or drink low-fat dairy products, such as skim milk or low-fat yogurt.  ? Fill one fourth of your plate at each meal with low-fat (lean) proteins. Low-fat proteins include fish, chicken without skin, eggs, beans, and tofu.  ? Avoid fatty meat, cured and processed meat, or chicken with skin.  ? Avoid pre-made or processed food.  · Eat less than 1,500 mg of salt each day.  · Do not drink alcohol if:  ? Your doctor tells you not to drink.  ? You are pregnant, may be pregnant, or are planning to become pregnant.  · If you drink alcohol:  ? Limit how much you use to:  § 0-1 drink a day for women.  § 0-2 drinks a day for men.  ? Be aware of how much alcohol is in your drink. In the [place] one drink equals one 12 oz bottle of beer (355 mL), one 5 oz glass of wine (148 mL), or one 1½ oz glass of hard liquor (44 mL).    Lifestyle    · Work with your  doctor to stay at a healthy weight or to lose weight. Ask your doctor what the best weight is for you.  · Get at least 30 minutes of exercise most days of the week. This may include walking, swimming, or biking.  · Get at least 30 minutes of exercise that strengthens your muscles (resistance exercise) at least 3 days a week. This may include lifting weights or doing Pilates.  · Do not use any products that contain nicotine or tobacco, such as cigarettes, e-cigarettes, and chewing tobacco. If you need help quitting, ask your doctor.  · Check your blood pressure at home as told by your doctor.  · Keep all follow-up visits as told by your doctor. This is important.    Medicines  · Take over-the-counter and prescription medicines only as told by your doctor. Follow directions carefully.  · Do not skip doses of blood pressure medicine. The medicine does not work as well if you skip doses. Skipping doses also puts you at risk for problems.  · Ask your doctor about side effects or reactions to medicines that you should watch for.  Contact a doctor if you:  · Think you are having a reaction to the medicine you are taking.  · Have headaches that keep coming back (recurring).  · Feel dizzy.  · Have swelling in your ankles.  · Have trouble with your vision.  Get help right away if you:  · Get a very bad headache.  · Start to feel mixed up (confused).  · Feel weak or numb.  · Feel faint.  · Have very bad pain in your:  ? Chest.  ? Belly (abdomen).  · Throw up more than once.  · Have trouble breathing.  Summary  · Hypertension is another name for high blood pressure.  · High blood pressure forces your heart to work harder to pump blood.  · For most people, a normal blood pressure is less than 120/80.  · Making healthy choices can help lower blood pressure. If your blood pressure does not get lower with healthy choices, you may need to take medicine.  This information is not intended to replace advice given to you by your health  care provider. Make sure you discuss any questions you have with your health care provider.  Document Revised: 08/28/2019 Document Reviewed: 08/28/2019  [employer] Patient Education © 2021 [employer]

## 2021-12-10 ENCOUNTER — ANTICOAGULATION VISIT (OUTPATIENT)
Dept: CARDIOLOGY | Facility: CLINIC | Age: 56
End: 2021-12-10

## 2021-12-10 NOTE — PROGRESS NOTES
ABIOLA CLINE NP CHANGING PATIENT TO ELIQUIS 5 MG PO BID. PATIENT AWARE AND AGREEABLE PER THEIR CONVERSATION. PH,LPN

## 2022-03-24 NOTE — PROGRESS NOTES
PATIENT CALLED AND REMINDED WAY PAST DUE, LAST DRAW 3 MO. AGO. STATES SHE WILL TRY AND GO [facility]. PH,LPN   no

## 2022-04-11 ENCOUNTER — TELEPHONE (OUTPATIENT)
Dept: CARDIOLOGY | Facility: CLINIC | Age: 57
End: 2022-04-11

## 2022-04-11 DIAGNOSIS — Z86.718 HISTORY OF DEEP VEIN THROMBOSIS (DVT) OF LOWER EXTREMITY: Primary | ICD-10-CM

## 2022-04-11 NOTE — TELEPHONE ENCOUNTER
V/O PER TIERA GARCIA, PAC TO SEND NEW PT/INR STANDING ORDER TO [facility] DUE TO CURRENT ORDER EXPIRES 4-. ORDER FAXED. PH,LPN

## 2022-04-20 DIAGNOSIS — E78.2 MIXED HYPERLIPIDEMIA: ICD-10-CM

## 2022-04-20 DIAGNOSIS — M79.89 RIGHT LEG SWELLING: ICD-10-CM

## 2022-04-20 DIAGNOSIS — I10 PRIMARY HYPERTENSION: ICD-10-CM

## 2022-04-20 DIAGNOSIS — J06.9 UPPER RESPIRATORY TRACT INFECTION, UNSPECIFIED TYPE: ICD-10-CM

## 2022-04-20 DIAGNOSIS — I10 ESSENTIAL HYPERTENSION: ICD-10-CM

## 2022-04-20 DIAGNOSIS — I51.89 GRADE II DIASTOLIC DYSFUNCTION: ICD-10-CM

## 2022-04-20 RX ORDER — POTASSIUM CHLORIDE 750 MG/1
10 TABLET, FILM COATED, EXTENDED RELEASE ORAL DAILY
Qty: 90 TABLET | Refills: 3 | Status: SHIPPED | OUTPATIENT
Start: 2022-04-20

## 2022-07-11 ENCOUNTER — OFFICE VISIT (OUTPATIENT)
Dept: CARDIOLOGY | Facility: CLINIC | Age: 57
End: 2022-07-11

## 2022-07-11 VITALS
OXYGEN SATURATION: 98 % | BODY MASS INDEX: 34.68 KG/M2 | DIASTOLIC BLOOD PRESSURE: 55 MMHG | HEIGHT: 66 IN | HEART RATE: 62 BPM | SYSTOLIC BLOOD PRESSURE: 122 MMHG | WEIGHT: 215.8 LBS

## 2022-07-11 DIAGNOSIS — I51.89 GRADE II DIASTOLIC DYSFUNCTION: ICD-10-CM

## 2022-07-11 DIAGNOSIS — I73.9 PVD (PERIPHERAL VASCULAR DISEASE): ICD-10-CM

## 2022-07-11 DIAGNOSIS — Z86.718 HISTORY OF DEEP VEIN THROMBOSIS (DVT) OF LOWER EXTREMITY: ICD-10-CM

## 2022-07-11 DIAGNOSIS — I10 ESSENTIAL HYPERTENSION: Primary | ICD-10-CM

## 2022-07-11 DIAGNOSIS — M79.604 RIGHT LEG PAIN: ICD-10-CM

## 2022-07-11 DIAGNOSIS — I50.32 CHRONIC DIASTOLIC (CONGESTIVE) HEART FAILURE: ICD-10-CM

## 2022-07-11 PROCEDURE — 99214 OFFICE O/P EST MOD 30 MIN: CPT | Performed by: NURSE PRACTITIONER

## 2022-07-11 NOTE — PROGRESS NOTES
Subjective     Vannessa Contreras is a 56 y.o. female who presents to day for 6 month follow up  and Hypertension.    CHIEF COMPLIANT  Chief Complaint   Patient presents with   • 6 month follow up    • Hypertension       Active Problems:  Problem List Items Addressed This Visit        Cardiac and Vasculature    PVD (peripheral vascular disease) (HCC)    Overview     5.1 Bilateral aortobifemoral bypass 6/2007; thrombectomy rt aortofemoral graft 11/2009             Relevant Orders    CBC & Differential    Comprehensive Metabolic Panel    TSH    Magnesium    Lipid Panel    Vitamin D 1,25 Dihydroxy    proBNP    Essential hypertension - Primary    Relevant Orders    CBC & Differential    Comprehensive Metabolic Panel    TSH    Magnesium    Lipid Panel    Vitamin D 1,25 Dihydroxy    proBNP      Other Visit Diagnoses     Right leg pain        Relevant Orders    CBC & Differential    Comprehensive Metabolic Panel    TSH    Magnesium    Lipid Panel    Vitamin D 1,25 Dihydroxy    proBNP    History of deep vein thrombosis (DVT) of lower extremity        Relevant Orders    CBC & Differential    Comprehensive Metabolic Panel    TSH    Magnesium    Lipid Panel    Vitamin D 1,25 Dihydroxy    proBNP    Grade II diastolic dysfunction        Relevant Orders    proBNP    Chronic diastolic (congestive) heart failure (HCC)         Relevant Orders    proBNP      1. Hyperlipidemia  2.Hypertension  3.Grade 2 diastolic dysfunction  4.R leg swelling  5.Anticoagulated on eliquis    HPI  HPI  Ms. Tabares is a 56-year-old female patient who is being followed up today for chronic arterial hypertension and peripheral vascular disease.  She does have chronic arterial hypertension in which she is currently being treated with metoprolol, losartan, amlodipine.  Her blood pressure seems to be relatively well controlled on this medication regimen as evident of a blood pressure of 122/55 in the office today.  She also has an extensive history of peripheral  vascular disease including femoral bypass and thrombus within the bypass graft in which she continues to be on Eliquis 5 mg twice daily as well as high intensity statin therapy of atorvastatin 40 mg nightly.  Overall she feels like she is done relatively well from the cardiovascular standpoint she denies any angina anginal equivalent symptoms.      PRIOR MEDS  Current Outpatient Medications on File Prior to Visit   Medication Sig Dispense Refill   • amLODIPine (NORVASC) 10 MG tablet Take 1 tablet by mouth Daily. 90 tablet 3   • apixaban (ELIQUIS) 5 MG tablet tablet Take 1 tablet by mouth 2 (Two) Times a Day. 180 tablet 3   • aspirin (ASPIRIN LOW DOSE) 81 MG EC tablet Take 81 mg by mouth 2 (Two) Times a Day.     • atorvastatin (LIPITOR) 40 MG tablet Take 1 tablet by mouth every night at bedtime. 90 tablet 3   • cetirizine (zyrTEC) 10 MG tablet Take 10 mg by mouth Daily.     • furosemide (LASIX) 20 MG tablet Take 1 tablet by mouth 2 (Two) Times a Day. 180 tablet 3   • losartan (COZAAR) 100 MG tablet Take 100 mg by mouth Daily.     • meloxicam (MOBIC) 15 MG tablet Take 15 mg by mouth Daily.     • metoprolol tartrate (LOPRESSOR) 50 MG tablet Take 1 tablet by mouth 2 (Two) Times a Day. 90 tablet 3   • Multiple Vitamins-Minerals (MULTIVITAMIN ADULT PO) Take  by mouth Daily.     • nitroglycerin (NITROSTAT) 0.4 MG SL tablet Place 0.4 mg under the tongue Every 5 (Five) Minutes As Needed.     • Omega-3 Fatty Acids (EQL FISH OIL) 1200 MG capsule Take 1 tablet by mouth 2 (Two) Times a Day.     • potassium chloride 10 MEQ CR tablet Take 1 tablet by mouth Daily. 90 tablet 3   • [DISCONTINUED] brompheniramine-pseudoephedrine-DM 30-2-10 MG/5ML syrup Take 10 mL by mouth 4 (Four) Times a Day As Needed for Congestion, Cough or Allergies. 473 mL 0     No current facility-administered medications on file prior to visit.       ALLERGIES  Patient has no known allergies.    HISTORY  Past Medical History:   Diagnosis Date   • Arterial  "insufficiency ([facility])    • Atherosclerosis of arteries of extremities ([facility])    • CAD (coronary artery disease)    • DVT (deep venous thrombosis) ([facility])     h.o - on coumadin    • Fatigue    • Hyperlipidemia    • Hypertension    • Palpitations    • PVD (peripheral vascular disease) ([facility])    • SOB (shortness of breath)        Social History     Socioeconomic History   • Marital status:    Tobacco Use   • Smoking status: Current Every Day Smoker     Packs/day: 1.00     Types: Cigarettes   • Smokeless tobacco: Never Used   Substance and Sexual Activity   • Alcohol use: Never     Comment: Social    • Drug use: Never   • Sexual activity: Defer       Family History   Problem Relation Age of Onset   • Atrial fibrillation Mother    • Heart attack Father    • Diabetes Sister    • Hypertension Sister    • Stroke Maternal Grandmother    • Peripheral vascular disease Other        Review of Systems   Constitutional: Negative for chills, fatigue and fever.   HENT: Negative for congestion, rhinorrhea and sore throat.    Respiratory: Negative for chest tightness and shortness of breath.    Cardiovascular: Positive for leg swelling (R Knee causes issues). Negative for chest pain (R swelling has knee issues) and palpitations.   Gastrointestinal: Negative for constipation, diarrhea and nausea.   Musculoskeletal: Positive for arthralgias. Negative for gait problem and neck pain.   Allergic/Immunologic: Positive for environmental allergies. Negative for food allergies.   Neurological: Negative for dizziness, syncope, weakness and light-headedness.   Hematological: Does not bruise/bleed easily.   Psychiatric/Behavioral: Negative for sleep disturbance.       Objective     VITALS: /55 (BP Location: Left arm, Patient Position: Sitting)   Pulse 62   Ht 167.6 cm (65.98\")   Wt 97.9 kg (215 lb 12.8 oz)   SpO2 98%   BMI 34.85 kg/m²     LABS:   Lab Results (most recent)     None          IMAGING:   No Images in the past 120 days " found..    EXAM:  Physical Exam  Vitals and nursing note reviewed.   Constitutional:       Appearance: She is well-developed.   HENT:      Head: Normocephalic.   Eyes:      Pupils: Pupils are equal, round, and reactive to light.   Neck:      Thyroid: No thyroid mass.      Vascular: No carotid bruit or JVD.      Trachea: Trachea and phonation normal.   Cardiovascular:      Rate and Rhythm: Normal rate and regular rhythm.      Pulses:           Radial pulses are 2+ on the right side and 2+ on the left side.        Dorsalis pedis pulses are 2+ on the right side and 2+ on the left side.        Posterior tibial pulses are 2+ on the right side and 2+ on the left side.      Heart sounds: Normal heart sounds. No murmur heard.    No friction rub. No gallop.   Pulmonary:      Effort: Pulmonary effort is normal. No respiratory distress.      Breath sounds: Normal breath sounds. No wheezing or rales.   Abdominal:      General: Bowel sounds are normal.      Palpations: Abdomen is soft.   Musculoskeletal:         General: Swelling ( Trace) present. Normal range of motion.      Cervical back: Neck supple.   Skin:     General: Skin is warm and dry.      Capillary Refill: Capillary refill takes less than 2 seconds.      Findings: No rash.   Neurological:      Mental Status: She is alert and oriented to person, place, and time.   Psychiatric:         Speech: Speech normal.         Behavior: Behavior normal.         Thought Content: Thought content normal.         Judgment: Judgment normal.         Procedure   Procedures       Assessment & Plan    Diagnosis Plan   1. Essential hypertension  CBC & Differential    Comprehensive Metabolic Panel    TSH    Magnesium    Lipid Panel    Vitamin D 1,25 Dihydroxy    proBNP   2. Right leg pain  CBC & Differential    Comprehensive Metabolic Panel    TSH    Magnesium    Lipid Panel    Vitamin D 1,25 Dihydroxy    proBNP   3. PVD (peripheral vascular disease) ([facility])  CBC & Differential     Comprehensive Metabolic Panel    TSH    Magnesium    Lipid Panel    Vitamin D 1,25 Dihydroxy    proBNP   4. History of deep vein thrombosis (DVT) of lower extremity  CBC & Differential    Comprehensive Metabolic Panel    TSH    Magnesium    Lipid Panel    Vitamin D 1,25 Dihydroxy    proBNP   5. Grade II diastolic dysfunction  proBNP   6. Chronic diastolic (congestive) heart failure (HCC)   proBNP   1.  Patient's blood pressure is controlled on current blood pressure medication regimen.  No medication changes are warranted at this time.  Patient advised to monitor blood pressure on a daily basis and report any persistent highs or lows.  Set goal blood pressure for patient at 130/80 or below.  2.  Patient has a history of peripheral vascular disease with aortic bypass grafting.  She ended up having a clot in that graft and had to go under thrombectomy has been on anticoagulation ever since and is currently on Eliquis.  She denies any signs or symptoms of bleeding.  We will continue to monitor.  3.  Patient continues to have some lower extremity edema with the right leg being worse than the left.  She did have a history of diastolic dysfunction grade 2.  4.  I would like to do an extensive laboratory work-up including CBC, CMP, TSH, magnesium, lipid panel, proBNP, and vitamin D.  5.  Informed of signs and symptoms of ACS and advised to seek emergent treatment for any new worsening symptoms.  Patient also advised sooner follow-up as needed.  Also advised to follow-up with family doctor as needed  This note is dictated utilizing voice recognition software.  Although this record has been proof read, transcriptional errors may still be present. If questions occur regarding the content of this record please do not hesitate to call our office.  I have reviewed and confirmed the accuracy of the ROS as documented by the MA/LPN/RN JAMAAL Salazar    Return in about 6 months (around 1/11/2023), or if symptoms worsen or fail  to improve.    Diagnoses and all orders for this visit:    1. Essential hypertension (Primary)  -     CBC & Differential; Future  -     Comprehensive Metabolic Panel; Future  -     TSH; Future  -     Magnesium; Future  -     Lipid Panel; Future  -     Vitamin D 1,25 Dihydroxy; Future  -     proBNP; Future    2. Right leg pain  -     CBC & Differential; Future  -     Comprehensive Metabolic Panel; Future  -     TSH; Future  -     Magnesium; Future  -     Lipid Panel; Future  -     Vitamin D 1,25 Dihydroxy; Future  -     proBNP; Future    3. PVD (peripheral vascular disease) (HCC)  -     CBC & Differential; Future  -     Comprehensive Metabolic Panel; Future  -     TSH; Future  -     Magnesium; Future  -     Lipid Panel; Future  -     Vitamin D 1,25 Dihydroxy; Future  -     proBNP; Future    4. History of deep vein thrombosis (DVT) of lower extremity  -     CBC & Differential; Future  -     Comprehensive Metabolic Panel; Future  -     TSH; Future  -     Magnesium; Future  -     Lipid Panel; Future  -     Vitamin D 1,25 Dihydroxy; Future  -     proBNP; Future    5. Grade II diastolic dysfunction  -     proBNP; Future    6. Chronic diastolic (congestive) heart failure (HCC)   -     proBNP; Future        Vannessa ALANIZ Christopherchica  reports that she has been smoking cigarettes. She has been smoking about 1.00 pack per day. She has never used smokeless tobacco.. I have educated her on the risk of diseases from using tobacco products .         MEDS ORDERED DURING VISIT:  No orders of the defined types were placed in this encounter.          This document has been electronically signed by JAMAAL Salazar Jr.  July 11, 2022 13:02 EDT

## 2022-07-25 DIAGNOSIS — I10 PRIMARY HYPERTENSION: ICD-10-CM

## 2022-07-25 RX ORDER — METOPROLOL TARTRATE 50 MG/1
50 TABLET, FILM COATED ORAL 2 TIMES DAILY
Qty: 90 TABLET | Refills: 3 | Status: SHIPPED | OUTPATIENT
Start: 2022-07-25

## 2022-11-22 ENCOUNTER — TELEPHONE (OUTPATIENT)
Dept: CARDIOLOGY | Facility: CLINIC | Age: 57
End: 2022-11-22

## 2022-11-22 NOTE — TELEPHONE ENCOUNTER
"Working on overdue results, pt is past due for labs ordered on 7/11/22, as we have not received results or it has not been performed.   OV note from 7/11/22 states:  \" I would like to do an extensive laboratory work-up including CBC, CMP, TSH, magnesium, lipid panel, proBNP, and vitamin D.\"      I called pt, she stated that she had forgotten about the labs and will get them drawn at our office today or tomorrow.       "

## 2022-11-23 ENCOUNTER — LAB (OUTPATIENT)
Dept: LAB | Facility: HOSPITAL | Age: 57
End: 2022-11-23

## 2022-11-23 DIAGNOSIS — I73.9 PVD (PERIPHERAL VASCULAR DISEASE): ICD-10-CM

## 2022-11-23 DIAGNOSIS — Z86.718 HISTORY OF DEEP VEIN THROMBOSIS (DVT) OF LOWER EXTREMITY: ICD-10-CM

## 2022-11-23 DIAGNOSIS — I10 ESSENTIAL HYPERTENSION: ICD-10-CM

## 2022-11-23 DIAGNOSIS — I50.32 CHRONIC DIASTOLIC (CONGESTIVE) HEART FAILURE: ICD-10-CM

## 2022-11-23 DIAGNOSIS — M79.604 RIGHT LEG PAIN: ICD-10-CM

## 2022-11-23 DIAGNOSIS — I51.89 GRADE II DIASTOLIC DYSFUNCTION: ICD-10-CM

## 2022-11-23 LAB
ALBUMIN SERPL-MCNC: 4.13 G/DL (ref 3.5–5.2)
ALBUMIN/GLOB SERPL: 1.3 G/DL
ALP SERPL-CCNC: 143 U/L (ref 39–117)
ALT SERPL W P-5'-P-CCNC: 46 U/L (ref 1–33)
ANION GAP SERPL CALCULATED.3IONS-SCNC: 14.5 MMOL/L (ref 5–15)
AST SERPL-CCNC: 42 U/L (ref 1–32)
BASOPHILS # BLD AUTO: 0.06 10*3/MM3 (ref 0–0.2)
BASOPHILS NFR BLD AUTO: 0.7 % (ref 0–1.5)
BILIRUB SERPL-MCNC: 0.3 MG/DL (ref 0–1.2)
BUN SERPL-MCNC: 9 MG/DL (ref 6–20)
BUN/CREAT SERPL: 12.3 (ref 7–25)
CALCIUM SPEC-SCNC: 9.2 MG/DL (ref 8.6–10.5)
CHLORIDE SERPL-SCNC: 102 MMOL/L (ref 98–107)
CHOLEST SERPL-MCNC: 140 MG/DL (ref 0–200)
CO2 SERPL-SCNC: 23.5 MMOL/L (ref 22–29)
CREAT SERPL-MCNC: 0.73 MG/DL (ref 0.57–1)
DEPRECATED RDW RBC AUTO: 51.3 FL (ref 37–54)
EGFRCR SERPLBLD CKD-EPI 2021: 97.3 ML/MIN/1.73
EOSINOPHIL # BLD AUTO: 0.2 10*3/MM3 (ref 0–0.4)
EOSINOPHIL NFR BLD AUTO: 2.3 % (ref 0.3–6.2)
ERYTHROCYTE [DISTWIDTH] IN BLOOD BY AUTOMATED COUNT: 14.6 % (ref 12.3–15.4)
GLOBULIN UR ELPH-MCNC: 3.2 GM/DL
GLUCOSE SERPL-MCNC: 106 MG/DL (ref 65–99)
HCT VFR BLD AUTO: 41.6 % (ref 34–46.6)
HDLC SERPL-MCNC: 35 MG/DL (ref 40–60)
HGB BLD-MCNC: 13.5 G/DL (ref 12–15.9)
IMM GRANULOCYTES # BLD AUTO: 0.04 10*3/MM3 (ref 0–0.05)
IMM GRANULOCYTES NFR BLD AUTO: 0.5 % (ref 0–0.5)
LDLC SERPL CALC-MCNC: 73 MG/DL (ref 0–100)
LDLC/HDLC SERPL: 1.9 {RATIO}
LYMPHOCYTES # BLD AUTO: 2.62 10*3/MM3 (ref 0.7–3.1)
LYMPHOCYTES NFR BLD AUTO: 30.1 % (ref 19.6–45.3)
MAGNESIUM SERPL-MCNC: 2 MG/DL (ref 1.6–2.6)
MCH RBC QN AUTO: 31.4 PG (ref 26.6–33)
MCHC RBC AUTO-ENTMCNC: 32.5 G/DL (ref 31.5–35.7)
MCV RBC AUTO: 96.7 FL (ref 79–97)
MONOCYTES # BLD AUTO: 0.57 10*3/MM3 (ref 0.1–0.9)
MONOCYTES NFR BLD AUTO: 6.6 % (ref 5–12)
NEUTROPHILS NFR BLD AUTO: 5.21 10*3/MM3 (ref 1.7–7)
NEUTROPHILS NFR BLD AUTO: 59.8 % (ref 42.7–76)
NRBC BLD AUTO-RTO: 0 /100 WBC (ref 0–0.2)
NT-PROBNP SERPL-MCNC: 37.7 PG/ML (ref 0–900)
PLATELET # BLD AUTO: 270 10*3/MM3 (ref 140–450)
PMV BLD AUTO: 11.9 FL (ref 6–12)
POTASSIUM SERPL-SCNC: 3.9 MMOL/L (ref 3.5–5.2)
PROT SERPL-MCNC: 7.3 G/DL (ref 6–8.5)
RBC # BLD AUTO: 4.3 10*6/MM3 (ref 3.77–5.28)
SODIUM SERPL-SCNC: 140 MMOL/L (ref 136–145)
TRIGL SERPL-MCNC: 192 MG/DL (ref 0–150)
TSH SERPL DL<=0.05 MIU/L-ACNC: 0.99 UIU/ML (ref 0.27–4.2)
VLDLC SERPL-MCNC: 32 MG/DL (ref 5–40)
WBC NRBC COR # BLD: 8.7 10*3/MM3 (ref 3.4–10.8)

## 2022-11-23 PROCEDURE — 80061 LIPID PANEL: CPT | Performed by: NURSE PRACTITIONER

## 2022-11-23 PROCEDURE — 83735 ASSAY OF MAGNESIUM: CPT | Performed by: NURSE PRACTITIONER

## 2022-11-23 PROCEDURE — 84443 ASSAY THYROID STIM HORMONE: CPT | Performed by: NURSE PRACTITIONER

## 2022-11-23 PROCEDURE — 83880 ASSAY OF NATRIURETIC PEPTIDE: CPT | Performed by: NURSE PRACTITIONER

## 2022-11-23 PROCEDURE — 85025 COMPLETE CBC W/AUTO DIFF WBC: CPT | Performed by: NURSE PRACTITIONER

## 2022-11-23 PROCEDURE — 82652 VIT D 1 25-DIHYDROXY: CPT | Performed by: NURSE PRACTITIONER

## 2022-11-23 PROCEDURE — 80053 COMPREHEN METABOLIC PANEL: CPT | Performed by: NURSE PRACTITIONER

## 2022-11-26 LAB — 1,25(OH)2D SERPL-MCNC: 56.3 PG/ML (ref 24.8–81.5)

## 2022-11-28 ENCOUNTER — TELEPHONE (OUTPATIENT)
Dept: CARDIOLOGY | Facility: CLINIC | Age: 57
End: 2022-11-28

## 2022-11-28 NOTE — TELEPHONE ENCOUNTER
----- Message from JAMAAL Salazar sent at 11/28/2022  1:20 PM EST -----  Mild elevation of liver function.  Increased a little from last time.  Otherwise labs are within normal limits.    I called and went over lab results with patient.    Kelley WILSON

## 2023-01-25 RX ORDER — AZITHROMYCIN 250 MG/1
TABLET, FILM COATED ORAL
Qty: 6 TABLET | Refills: 0 | Status: SHIPPED | OUTPATIENT
Start: 2023-01-25

## 2023-04-17 ENCOUNTER — OFFICE VISIT (OUTPATIENT)
Dept: CARDIOLOGY | Facility: CLINIC | Age: 58
End: 2023-04-17
Payer: MEDICARE

## 2023-04-17 VITALS
HEIGHT: 66 IN | SYSTOLIC BLOOD PRESSURE: 147 MMHG | WEIGHT: 212.6 LBS | DIASTOLIC BLOOD PRESSURE: 69 MMHG | OXYGEN SATURATION: 95 % | BODY MASS INDEX: 34.17 KG/M2 | HEART RATE: 74 BPM

## 2023-04-17 DIAGNOSIS — M79.89 RIGHT LEG SWELLING: ICD-10-CM

## 2023-04-17 DIAGNOSIS — I10 PRIMARY HYPERTENSION: Primary | ICD-10-CM

## 2023-04-17 PROCEDURE — 1160F RVW MEDS BY RX/DR IN RCRD: CPT | Performed by: NURSE PRACTITIONER

## 2023-04-17 PROCEDURE — 1159F MED LIST DOCD IN RCRD: CPT | Performed by: NURSE PRACTITIONER

## 2023-04-17 PROCEDURE — 3077F SYST BP >= 140 MM HG: CPT | Performed by: NURSE PRACTITIONER

## 2023-04-17 PROCEDURE — 3078F DIAST BP <80 MM HG: CPT | Performed by: NURSE PRACTITIONER

## 2023-04-17 PROCEDURE — 99213 OFFICE O/P EST LOW 20 MIN: CPT | Performed by: NURSE PRACTITIONER

## 2023-04-17 RX ORDER — AZITHROMYCIN 250 MG/1
TABLET, FILM COATED ORAL
Qty: 6 TABLET | Refills: 0 | Status: SHIPPED | OUTPATIENT
Start: 2023-04-17

## 2023-04-17 RX ORDER — LOSARTAN POTASSIUM AND HYDROCHLOROTHIAZIDE 25; 100 MG/1; MG/1
1 TABLET ORAL DAILY
Qty: 90 TABLET | Refills: 3 | Status: SHIPPED | OUTPATIENT
Start: 2023-04-17

## 2023-04-17 RX ORDER — METHYLPREDNISOLONE 4 MG/1
TABLET ORAL
Qty: 21 TABLET | Refills: 0 | Status: SHIPPED | OUTPATIENT
Start: 2023-04-17

## 2023-04-17 RX ORDER — BROMPHENIRAMINE MALEATE, PSEUDOEPHEDRINE HYDROCHLORIDE, AND DEXTROMETHORPHAN HYDROBROMIDE 2; 30; 10 MG/5ML; MG/5ML; MG/5ML
5 SYRUP ORAL 4 TIMES DAILY PRN
Qty: 200 ML | Refills: 1 | Status: SHIPPED | OUTPATIENT
Start: 2023-04-17

## 2023-04-17 NOTE — PROGRESS NOTES
Subjective     Vannessa Contreras is a 57 y.o. female who presents to day for Follow-up (6mth).    CHIEF COMPLIANT  Chief Complaint   Patient presents with   • Follow-up     6mth       Active Problems:  Problem List Items Addressed This Visit        Cardiac and Vasculature    Hypertension - Primary    Relevant Medications    losartan-hydrochlorothiazide (Hyzaar) 100-25 MG per tablet   Other Visit Diagnoses     Right leg swelling            1. Hyperlipidemia  2.Hypertension  3.Grade 2 diastolic dysfunction  4.R leg swelling  5.Anticoagulated on eliquis      HPI  HPI  Ms. Vannessa Tabares 57-year-old female patient being followed up today for chronic arterial hypertension and peripheral vascular disease with previous clot.  Patient does have chronic arterial hypertension which her blood pressure is elevated today at 147/69 heart rate of 74.  She is currently being treated with amlodipine, losartan, and metoprolol.  She reports that she has been under tremendous amount of stress due to her  having rectal cancer and receiving multiple treatments.    Patient does have a history of significant peripheral vascular disease with clot which she has been anticoagulated long-term with Eliquis.  She denies any signs or symptoms of bleeding.  She also denies any claudication-like symptoms at this time.    She does report some lower extremity edema that occurs on a daily basis.  She is currently on Lasix 20 mg twice daily.  She does have a history of grade 2 diastolic dysfunction per echocardiogram.    Overall she feels like she is doing relatively well.  She denies any chest pain, shortness of breath, palpitations, dizziness, lightheadedness, syncope, PND, orthopnea, or strokelike symptoms.    PRIOR MEDS  Current Outpatient Medications on File Prior to Visit   Medication Sig Dispense Refill   • amLODIPine (NORVASC) 10 MG tablet Take 1 tablet by mouth Daily. 90 tablet 3   • apixaban (ELIQUIS) 5 MG tablet tablet Take 1 tablet by  mouth 2 (Two) Times a Day. 180 tablet 3   • aspirin (ASPIRIN LOW DOSE) 81 MG EC tablet Take 1 tablet by mouth 2 (Two) Times a Day.     • atorvastatin (LIPITOR) 40 MG tablet Take 1 tablet by mouth every night at bedtime. 90 tablet 3   • cetirizine (zyrTEC) 10 MG tablet Take 1 tablet by mouth Daily.     • furosemide (LASIX) 20 MG tablet Take 1 tablet by mouth 2 (Two) Times a Day. 180 tablet 3   • meloxicam (MOBIC) 15 MG tablet Take 1 tablet by mouth Daily.     • metoprolol tartrate (LOPRESSOR) 50 MG tablet Take 1 tablet by mouth 2 (Two) Times a Day. 90 tablet 3   • Multiple Vitamins-Minerals (MULTIVITAMIN ADULT PO) Take  by mouth Daily.     • nitroglycerin (NITROSTAT) 0.4 MG SL tablet Place 1 tablet under the tongue Every 5 (Five) Minutes As Needed.     • Omega-3 Fatty Acids (EQL FISH OIL) 1200 MG capsule Take 1 tablet by mouth 2 (Two) Times a Day.     • potassium chloride 10 MEQ CR tablet Take 1 tablet by mouth Daily. 90 tablet 3   • metFORMIN (GLUCOPHAGE) 500 MG tablet Take 1 tablet by mouth Daily.       No current facility-administered medications on file prior to visit.       ALLERGIES  Patient has no known allergies.    HISTORY  Past Medical History:   Diagnosis Date   • Arterial insufficiency    • Atherosclerosis of arteries of extremities    • CAD (coronary artery disease)    • DVT (deep venous thrombosis)     h.o - on coumadin    • Fatigue    • Hyperlipidemia    • Hypertension    • Palpitations    • Prediabetes    • PVD (peripheral vascular disease)    • SOB (shortness of breath)        Social History     Socioeconomic History   • Marital status:    Tobacco Use   • Smoking status: Every Day     Packs/day: 1.00     Types: Cigarettes   • Smokeless tobacco: Never   Substance and Sexual Activity   • Alcohol use: Never     Comment: Social    • Drug use: Never   • Sexual activity: Defer       Family History   Problem Relation Age of Onset   • Atrial fibrillation Mother    • Heart attack Father    • Diabetes  "Sister    • Hypertension Sister    • Stroke Maternal Grandmother    • Peripheral vascular disease Other        Review of Systems   Constitutional: Positive for fatigue (States she has a lot going on ).   HENT: Positive for congestion (allergies), rhinorrhea (allergies) and sore throat (allergies).    Respiratory: Negative for chest tightness and shortness of breath.    Cardiovascular: Positive for leg swelling (RLE). Negative for chest pain and palpitations.   Gastrointestinal: Negative.    Genitourinary: Negative.    Allergic/Immunologic: Positive for environmental allergies (Seasonal ). Negative for food allergies.   Neurological: Negative for dizziness, syncope, weakness, light-headedness, numbness and headaches.   Hematological: Does not bruise/bleed easily.   Psychiatric/Behavioral: Positive for sleep disturbance (Lots going on ).       Objective     VITALS: /69   Pulse 74   Ht 167.6 cm (66\")   Wt 96.4 kg (212 lb 9.6 oz)   SpO2 95%   BMI 34.31 kg/m²     LABS:   Lab Results (most recent)     None          IMAGING:   No Images in the past 120 days found..    EXAM:  Physical Exam  Vitals and nursing note reviewed.   Constitutional:       Appearance: She is well-developed.   HENT:      Head: Normocephalic.   Eyes:      Pupils: Pupils are equal, round, and reactive to light.   Neck:      Thyroid: No thyroid mass.      Vascular: No carotid bruit or JVD.      Trachea: Trachea and phonation normal.   Cardiovascular:      Rate and Rhythm: Normal rate and regular rhythm.      Pulses:           Radial pulses are 2+ on the right side and 2+ on the left side.        Posterior tibial pulses are 2+ on the right side and 2+ on the left side.      Heart sounds: Normal heart sounds. No murmur heard.    No friction rub. No gallop.   Pulmonary:      Effort: Pulmonary effort is normal. No respiratory distress.      Breath sounds: Normal breath sounds. No wheezing or rales.   Abdominal:      General: Bowel sounds are " normal.      Palpations: Abdomen is soft.   Musculoskeletal:         General: Swelling ( Trace) present. Normal range of motion.      Cervical back: Neck supple.   Skin:     General: Skin is warm and dry.      Capillary Refill: Capillary refill takes less than 2 seconds.      Findings: No rash.   Neurological:      Mental Status: She is alert and oriented to person, place, and time.   Psychiatric:         Speech: Speech normal.         Behavior: Behavior normal.         Thought Content: Thought content normal.         Judgment: Judgment normal.         Procedure   Procedures       Assessment & Plan    Diagnosis Plan   1. Primary hypertension        2. Right leg swelling        1.  Patient's blood pressure is elevated today.  We will add hydrochlorothiazide to her losartan not only to help reduce her blood pressure but also should help with her bilateral lower extremity edema.  She will continue to monitor blood pressure on routine basis report any significant highs or lows.  2.  Patient does have chronic lower extremity edema with the right being slightly worse than the left.  She also has a history of significant peripheral vascular disease.  She is on Eliquis for anticoagulation and she denies any signs or symptoms of bleeding.  3.  Patient's  is receiving chemotherapy and she has been experiencing severe allergies is been going on for over a month.  We will prescribe Bromfed, Z-Antoni, and a Medrol Dosepak to help avoid any infections that could complicate her 's immunocompromise state.  4.  Informed of signs and symptoms of ACS and advised to seek emergent treatment for any new worsening symptoms.  Patient also advised sooner follow-up as needed.  Also advised to follow-up with family doctor as needed  This note is dictated utilizing voice recognition software.  Although this record has been proof read, transcriptional errors may still be present. If questions occur regarding the content of this  record please do not hesitate to call our office.  I have reviewed and confirmed the accuracy of the ROS as documented by the MA/LPN/RN JAMAAL Salazar    Return in about 6 months (around 10/17/2023), or if symptoms worsen or fail to improve.    Diagnoses and all orders for this visit:    1. Primary hypertension (Primary)    2. Right leg swelling    Other orders  -     ECG 12 Lead  -     brompheniramine-pseudoephedrine-DM 30-2-10 MG/5ML syrup; Take 5 mL by mouth 4 (Four) Times a Day As Needed for Congestion, Cough or Allergies.  Dispense: 200 mL; Refill: 1  -     methylPREDNISolone (MEDROL) 4 MG dose pack; Take as directed on package instructions.  Dispense: 21 tablet; Refill: 0  -     azithromycin (Zithromax Z-Antoni) 250 MG tablet; Take 2 tablets by mouth on day 1, then 1 tablet daily on days 2-5  Dispense: 6 tablet; Refill: 0  -     losartan-hydrochlorothiazide (Hyzaar) 100-25 MG per tablet; Take 1 tablet by mouth Daily.  Dispense: 90 tablet; Refill: 3        Vannessa Contreras  reports that she has been smoking cigarettes. She has been smoking an average of 1 pack per day. She has never used smokeless tobacco.. I have educated her on the risk of diseases from using tobacco products such as cancer, COPD and heart disease.     I advised her to quit and she is not willing to quit.    I spent 3  minutes counseling the patient.                MEDS ORDERED DURING VISIT:  New Medications Ordered This Visit   Medications   • brompheniramine-pseudoephedrine-DM 30-2-10 MG/5ML syrup     Sig: Take 5 mL by mouth 4 (Four) Times a Day As Needed for Congestion, Cough or Allergies.     Dispense:  200 mL     Refill:  1   • methylPREDNISolone (MEDROL) 4 MG dose pack     Sig: Take as directed on package instructions.     Dispense:  21 tablet     Refill:  0   • azithromycin (Zithromax Z-Antoni) 250 MG tablet     Sig: Take 2 tablets by mouth on day 1, then 1 tablet daily on days 2-5     Dispense:  6 tablet     Refill:  0   •  losartan-hydrochlorothiazide (Hyzaar) 100-25 MG per tablet     Sig: Take 1 tablet by mouth Daily.     Dispense:  90 tablet     Refill:  3           This document has been electronically signed by Rush Cruz Jr., APRN  April 19, 2023 22:41 EDT

## 2023-10-17 ENCOUNTER — OFFICE VISIT (OUTPATIENT)
Dept: CARDIOLOGY | Facility: CLINIC | Age: 58
End: 2023-10-17
Payer: MEDICARE

## 2023-10-17 VITALS
HEART RATE: 72 BPM | DIASTOLIC BLOOD PRESSURE: 54 MMHG | SYSTOLIC BLOOD PRESSURE: 109 MMHG | WEIGHT: 214.2 LBS | HEIGHT: 66 IN | OXYGEN SATURATION: 98 % | BODY MASS INDEX: 34.42 KG/M2

## 2023-10-17 DIAGNOSIS — R42 DIZZY: ICD-10-CM

## 2023-10-17 DIAGNOSIS — E78.2 MIXED HYPERLIPIDEMIA: ICD-10-CM

## 2023-10-17 DIAGNOSIS — I10 ESSENTIAL HYPERTENSION: Primary | ICD-10-CM

## 2023-10-17 DIAGNOSIS — I51.89 GRADE II DIASTOLIC DYSFUNCTION: ICD-10-CM

## 2023-10-17 PROCEDURE — 99213 OFFICE O/P EST LOW 20 MIN: CPT | Performed by: NURSE PRACTITIONER

## 2023-10-17 PROCEDURE — 1160F RVW MEDS BY RX/DR IN RCRD: CPT | Performed by: NURSE PRACTITIONER

## 2023-10-17 PROCEDURE — 1159F MED LIST DOCD IN RCRD: CPT | Performed by: NURSE PRACTITIONER

## 2023-10-17 PROCEDURE — 3074F SYST BP LT 130 MM HG: CPT | Performed by: NURSE PRACTITIONER

## 2023-10-17 PROCEDURE — 3078F DIAST BP <80 MM HG: CPT | Performed by: NURSE PRACTITIONER

## 2023-10-17 PROCEDURE — 93000 ELECTROCARDIOGRAM COMPLETE: CPT | Performed by: NURSE PRACTITIONER

## 2023-10-17 NOTE — PROGRESS NOTES
Subjective     Vannessa Contreras is a 57 y.o. female who presents to day for 6 month follow up  and Hypertension.    CHIEF COMPLIANT  Chief Complaint   Patient presents with    6 month follow up     Hypertension       Active Problems:  Problem List Items Addressed This Visit          Cardiac and Vasculature    Hyperlipidemia    Essential hypertension - Primary     Other Visit Diagnoses       Grade II diastolic dysfunction        Dizzy               Problem List :   1. Hyperlipidemia  2.Hypertension  2.1 echocardiogram 3/20: EF 56 to 60% grade 2 diastolic dysfunction no hemodynamically significant valvular disease  3.Grade 2 diastolic dysfunction  4.R leg swelling  4.1 arterial duplex of the lower extremities 12/19: 50% bilateral SFAs  5.Anticoagulated on eliquis  6.  Carotid duplex 2/20: Nonobstructive carotid artery disease  7.  Left heart cath 2007: Left main normal,, diagonal and LAD system normal, RCA normal, circumflex normal, EF 55 to 60%, LVEDP 12-16    HPI  HPI  Vannessa Tabares is a 57-year-old female patient being followed up today for chronic arterial hypertension and peripheral vascular disease.  Patient does have chronic arterial hypertension in which she is being treated with amlodipine, losartan hydrochlorothiazide, and metoprolol.  Today her blood pressure is well controlled at 109/54 heart rate is 72.    Patient does have a history of advanced peripheral vascular disease in which she is went under aortobifem as well as a thrombectomy.  She is on chronic Eliquis therapy.  She denies any bleeding with the Eliquis.    Overall patient feels like she is done relatively well from the cardiovascular standpoint.  She denies any angina anginal equivalent symptoms at this time.  She does have a history of left heart catheterization in 2007 with relatively normal coronary arteries with preserved ejection fraction.  Most recent echocardiogram identified an EF of 56 to 60% grade 2 diastolic dysfunction and he no  hemodynamically significant valvular disease.  She is on high intensity statin therapy of atorvastatin she is on anticoagulation of Eliquis antiplatelet therapy of aspirin.    She does report dizziness with quick position changes.  Patient did have carotid duplex evaluated in 2020 that showed nonobstructive carotid artery disease    She denies chest pain, shortness of breath, palpitations, fatigue, syncope, PND, orthopnea.    PRIOR MEDS  Current Outpatient Medications on File Prior to Visit   Medication Sig Dispense Refill    amLODIPine (NORVASC) 10 MG tablet Take 1 tablet by mouth Daily. 90 tablet 3    apixaban (ELIQUIS) 5 MG tablet tablet Take 1 tablet by mouth 2 (Two) Times a Day. 180 tablet 3    aspirin (ASPIRIN LOW DOSE) 81 MG EC tablet Take 1 tablet by mouth 2 (Two) Times a Day.      atorvastatin (LIPITOR) 40 MG tablet Take 1 tablet by mouth every night at bedtime. 90 tablet 3    cetirizine (zyrTEC) 10 MG tablet Take 1 tablet by mouth Daily.      furosemide (LASIX) 20 MG tablet Take 1 tablet by mouth 2 (Two) Times a Day. 180 tablet 3    losartan-hydrochlorothiazide (Hyzaar) 100-25 MG per tablet Take 1 tablet by mouth Daily. 90 tablet 3    meloxicam (MOBIC) 15 MG tablet Take 1 tablet by mouth Daily.      metFORMIN (GLUCOPHAGE) 500 MG tablet Take 1 tablet by mouth Daily.      metoprolol tartrate (LOPRESSOR) 50 MG tablet Take 1 tablet by mouth 2 (Two) Times a Day. 90 tablet 3    Multiple Vitamins-Minerals (MULTIVITAMIN ADULT PO) Take  by mouth Daily.      nitroglycerin (NITROSTAT) 0.4 MG SL tablet Place 1 tablet under the tongue Every 5 (Five) Minutes As Needed.      Omega-3 Fatty Acids (EQL FISH OIL) 1200 MG capsule Take 1 tablet by mouth 2 (Two) Times a Day.      potassium chloride 10 MEQ CR tablet Take 1 tablet by mouth Daily. 90 tablet 3    [DISCONTINUED] azithromycin (Zithromax Z-Antoni) 250 MG tablet Take 2 tablets by mouth on day 1, then 1 tablet daily on days 2-5 6 tablet 0    [DISCONTINUED]  brompheniramine-pseudoephedrine-DM 30-2-10 MG/5ML syrup Take 5 mL by mouth 4 (Four) Times a Day As Needed for Congestion, Cough or Allergies. 200 mL 1    [DISCONTINUED] methylPREDNISolone (MEDROL) 4 MG dose pack Take as directed on package instructions. 21 tablet 0     No current facility-administered medications on file prior to visit.       ALLERGIES  Patient has no known allergies.    HISTORY  Past Medical History:   Diagnosis Date    Arterial insufficiency     Atherosclerosis of arteries of extremities     CAD (coronary artery disease)     DVT (deep venous thrombosis)     h.o - on coumadin     Fatigue     Hyperlipidemia     Hypertension     Palpitations     Prediabetes     PVD (peripheral vascular disease)     SOB (shortness of breath)        Social History     Socioeconomic History    Marital status:    Tobacco Use    Smoking status: Every Day     Packs/day: 1     Types: Cigarettes    Smokeless tobacco: Never   Substance and Sexual Activity    Alcohol use: Never     Comment: Social     Drug use: Never    Sexual activity: Defer       Family History   Problem Relation Age of Onset    Atrial fibrillation Mother     Heart attack Father     Diabetes Sister     Hypertension Sister     Stroke Maternal Grandmother     Peripheral vascular disease Other        Review of Systems   Constitutional:  Negative for chills, fatigue and fever.   HENT:  Negative for congestion, rhinorrhea and sore throat.    Eyes:  Negative for visual disturbance.   Respiratory:  Negative for apnea, chest tightness and shortness of breath.    Cardiovascular:  Negative for chest pain, palpitations and leg swelling.   Gastrointestinal:  Negative for constipation, diarrhea and nausea.   Musculoskeletal:  Positive for arthralgias. Negative for back pain and neck pain.   Skin:  Negative for rash and wound.   Allergic/Immunologic: Positive for environmental allergies. Negative for food allergies.   Neurological:  Positive for dizziness (with  "quick movements not often) and light-headedness. Negative for syncope and weakness.   Hematological:  Does not bruise/bleed easily.   Psychiatric/Behavioral:  Positive for sleep disturbance (No SOB can not sleep).        Objective     VITALS: /54 (BP Location: Left arm, Patient Position: Sitting, Cuff Size: Adult)   Pulse 72   Ht 167.6 cm (65.98\")   Wt 97.2 kg (214 lb 3.2 oz)   SpO2 98%   BMI 34.59 kg/m²     LABS:   Lab Results (most recent)       None            IMAGING:   No Images in the past 120 days found..    EXAM:  Physical Exam  Vitals and nursing note reviewed.   Constitutional:       Appearance: She is well-developed.   HENT:      Head: Normocephalic.   Neck:      Thyroid: No thyroid mass.      Vascular: No carotid bruit or JVD.      Trachea: Trachea and phonation normal.   Cardiovascular:      Rate and Rhythm: Normal rate and regular rhythm.      Pulses:           Radial pulses are 2+ on the right side and 2+ on the left side.        Posterior tibial pulses are 2+ on the right side and 2+ on the left side.      Heart sounds: Normal heart sounds. No murmur heard.     No friction rub. No gallop.   Pulmonary:      Effort: Pulmonary effort is normal. No respiratory distress.      Breath sounds: Normal breath sounds. Wheezing present. No rales.   Musculoskeletal:         General: No swelling. Normal range of motion.      Cervical back: Neck supple.   Skin:     General: Skin is warm and dry.      Capillary Refill: Capillary refill takes less than 2 seconds.      Findings: No rash.   Neurological:      Mental Status: She is alert and oriented to person, place, and time.   Psychiatric:         Speech: Speech normal.         Behavior: Behavior normal.         Thought Content: Thought content normal.         Judgment: Judgment normal.         Procedure     ECG 12 Lead    Date/Time: 10/17/2023 8:25 AM  Performed by: Rush Cruz APRN    Authorized by: Rush Cruz APRN  Comparison: compared with " previous ECG from 12/9/2020  Similar to previous ECG  Rhythm: sinus bradycardia  Rate: bradycardic  BPM: 58  QRS axis: normal  Comments: QTc 434 ms  No acute changes             Assessment & Plan    Diagnosis Plan   1. Essential hypertension        2. Mixed hyperlipidemia        3. Grade II diastolic dysfunction        4. Dizzy        1.  Patient's blood pressure is controlled on current blood pressure medication regimen.  No medication changes are warranted at this time.  Patient advised to monitor blood pressure on a daily basis and report any persistent highs or lows.  Set goal blood pressure for patient at 130/80 or below.  2.  Patient does have hyperlipidemia which she is on atorvastatin.  3.  Patient does have grade 2 diastolic dysfunction which she has a history of lower extremity edema.  She does have hydrochlorothiazide in combination with her losartan.  4.  We reviewed her previous testing results in detail and discussed.  5.  Patient does have dizziness with quick position changes felt to be more orthopneic due to the hydrochlorothiazide and blood pressure medications than associated carotid artery disease.  She has a history of a negative carotid duplex.  6.  Informed of signs and symptoms of ACS and advised to seek emergent treatment for any new worsening symptoms.  Patient also advised sooner follow-up as needed.  Also advised to follow-up with family doctor as needed  This note is dictated utilizing voice recognition software.  Although this record has been proof read, transcriptional errors may still be present. If questions occur regarding the content of this record please do not hesitate to call our office.  I have reviewed and confirmed the accuracy of the ROS as documented by the MA/LPN/JAMAAL Bhatti    No follow-ups on file.    Diagnoses and all orders for this visit:    1. Essential hypertension (Primary)    2. Mixed hyperlipidemia    3. Grade II diastolic dysfunction    4.  Dizzy    Other orders  -     ECG 12 Lead        Vannessa Contreras  reports that she has been smoking cigarettes. She has been smoking an average of 1 pack per day. She has never used smokeless tobacco.. I have educated her on the risk of diseases from using tobacco products.       MEDS ORDERED DURING VISIT:  No orders of the defined types were placed in this encounter.          This document has been electronically signed by Rush Cruz Jr., APRN  October 17, 2023 08:34 EDT

## 2024-05-21 ENCOUNTER — LAB (OUTPATIENT)
Dept: LAB | Facility: HOSPITAL | Age: 59
End: 2024-05-21
Payer: MEDICARE

## 2024-05-21 ENCOUNTER — OFFICE VISIT (OUTPATIENT)
Dept: CARDIOLOGY | Facility: CLINIC | Age: 59
End: 2024-05-21
Payer: MEDICARE

## 2024-05-21 VITALS
HEART RATE: 64 BPM | SYSTOLIC BLOOD PRESSURE: 111 MMHG | WEIGHT: 205 LBS | DIASTOLIC BLOOD PRESSURE: 51 MMHG | HEIGHT: 66 IN | OXYGEN SATURATION: 96 % | BODY MASS INDEX: 32.95 KG/M2

## 2024-05-21 DIAGNOSIS — T38.3X5A ADVERSE EFFECT OF METFORMIN, INITIAL ENCOUNTER: ICD-10-CM

## 2024-05-21 DIAGNOSIS — R73.09 ELEVATED GLUCOSE: ICD-10-CM

## 2024-05-21 DIAGNOSIS — I25.10 CORONARY ARTERY DISEASE INVOLVING NATIVE CORONARY ARTERY OF NATIVE HEART WITHOUT ANGINA PECTORIS: ICD-10-CM

## 2024-05-21 DIAGNOSIS — E78.2 MIXED HYPERLIPIDEMIA: ICD-10-CM

## 2024-05-21 DIAGNOSIS — R73.03 PRE-DIABETES: ICD-10-CM

## 2024-05-21 DIAGNOSIS — I25.10 CORONARY ARTERY DISEASE INVOLVING NATIVE CORONARY ARTERY OF NATIVE HEART WITHOUT ANGINA PECTORIS: Primary | ICD-10-CM

## 2024-05-21 DIAGNOSIS — I73.9 PVD (PERIPHERAL VASCULAR DISEASE): ICD-10-CM

## 2024-05-21 DIAGNOSIS — I10 PRIMARY HYPERTENSION: ICD-10-CM

## 2024-05-21 LAB
ALBUMIN SERPL-MCNC: 4.3 G/DL (ref 3.5–5.2)
ALBUMIN/GLOB SERPL: 1.2 G/DL
ALP SERPL-CCNC: 128 U/L (ref 39–117)
ALT SERPL W P-5'-P-CCNC: 57 U/L (ref 1–33)
ANION GAP SERPL CALCULATED.3IONS-SCNC: 12.8 MMOL/L (ref 5–15)
AST SERPL-CCNC: 45 U/L (ref 1–32)
BASOPHILS # BLD AUTO: 0.06 10*3/MM3 (ref 0–0.2)
BASOPHILS NFR BLD AUTO: 0.5 % (ref 0–1.5)
BILIRUB SERPL-MCNC: 0.3 MG/DL (ref 0–1.2)
BUN SERPL-MCNC: 14 MG/DL (ref 6–20)
BUN/CREAT SERPL: 12.7 (ref 7–25)
CALCIUM SPEC-SCNC: 9.7 MG/DL (ref 8.6–10.5)
CHLORIDE SERPL-SCNC: 103 MMOL/L (ref 98–107)
CHOLEST SERPL-MCNC: 118 MG/DL (ref 0–200)
CO2 SERPL-SCNC: 24.2 MMOL/L (ref 22–29)
CREAT SERPL-MCNC: 1.1 MG/DL (ref 0.57–1)
DEPRECATED RDW RBC AUTO: 52 FL (ref 37–54)
EGFRCR SERPLBLD CKD-EPI 2021: 59.1 ML/MIN/1.73
EOSINOPHIL # BLD AUTO: 0.24 10*3/MM3 (ref 0–0.4)
EOSINOPHIL NFR BLD AUTO: 2.1 % (ref 0.3–6.2)
ERYTHROCYTE [DISTWIDTH] IN BLOOD BY AUTOMATED COUNT: 14.5 % (ref 12.3–15.4)
GLOBULIN UR ELPH-MCNC: 3.7 GM/DL
GLUCOSE SERPL-MCNC: 102 MG/DL (ref 65–99)
HBA1C MFR BLD: 5.9 % (ref 4.8–5.6)
HCT VFR BLD AUTO: 40.8 % (ref 34–46.6)
HDLC SERPL-MCNC: 39 MG/DL (ref 40–60)
HGB BLD-MCNC: 13.5 G/DL (ref 12–15.9)
IMM GRANULOCYTES # BLD AUTO: 0.06 10*3/MM3 (ref 0–0.05)
IMM GRANULOCYTES NFR BLD AUTO: 0.5 % (ref 0–0.5)
LDLC SERPL CALC-MCNC: 56 MG/DL (ref 0–100)
LDLC/HDLC SERPL: 1.35 {RATIO}
LYMPHOCYTES # BLD AUTO: 3.75 10*3/MM3 (ref 0.7–3.1)
LYMPHOCYTES NFR BLD AUTO: 33 % (ref 19.6–45.3)
MAGNESIUM SERPL-MCNC: 2.2 MG/DL (ref 1.6–2.6)
MCH RBC QN AUTO: 32.4 PG (ref 26.6–33)
MCHC RBC AUTO-ENTMCNC: 33.1 G/DL (ref 31.5–35.7)
MCV RBC AUTO: 97.8 FL (ref 79–97)
MONOCYTES # BLD AUTO: 0.7 10*3/MM3 (ref 0.1–0.9)
MONOCYTES NFR BLD AUTO: 6.2 % (ref 5–12)
NEUTROPHILS NFR BLD AUTO: 57.7 % (ref 42.7–76)
NEUTROPHILS NFR BLD AUTO: 6.56 10*3/MM3 (ref 1.7–7)
NRBC BLD AUTO-RTO: 0 /100 WBC (ref 0–0.2)
PLATELET # BLD AUTO: 306 10*3/MM3 (ref 140–450)
PMV BLD AUTO: 12.2 FL (ref 6–12)
POTASSIUM SERPL-SCNC: 4 MMOL/L (ref 3.5–5.2)
PROT SERPL-MCNC: 8 G/DL (ref 6–8.5)
RBC # BLD AUTO: 4.17 10*6/MM3 (ref 3.77–5.28)
SODIUM SERPL-SCNC: 140 MMOL/L (ref 136–145)
TRIGL SERPL-MCNC: 132 MG/DL (ref 0–150)
TSH SERPL DL<=0.05 MIU/L-ACNC: 1.2 UIU/ML (ref 0.27–4.2)
VLDLC SERPL-MCNC: 23 MG/DL (ref 5–40)
WBC NRBC COR # BLD AUTO: 11.37 10*3/MM3 (ref 3.4–10.8)

## 2024-05-21 PROCEDURE — 84443 ASSAY THYROID STIM HORMONE: CPT

## 2024-05-21 PROCEDURE — 85025 COMPLETE CBC W/AUTO DIFF WBC: CPT

## 2024-05-21 PROCEDURE — 80053 COMPREHEN METABOLIC PANEL: CPT

## 2024-05-21 PROCEDURE — 83036 HEMOGLOBIN GLYCOSYLATED A1C: CPT

## 2024-05-21 PROCEDURE — 80061 LIPID PANEL: CPT

## 2024-05-21 PROCEDURE — 83735 ASSAY OF MAGNESIUM: CPT

## 2024-05-21 PROCEDURE — 36415 COLL VENOUS BLD VENIPUNCTURE: CPT

## 2024-05-21 RX ORDER — SEMAGLUTIDE 1.34 MG/ML
0.25 INJECTION, SOLUTION SUBCUTANEOUS WEEKLY
Qty: 1.5 ML | Refills: 5 | Status: SHIPPED | OUTPATIENT
Start: 2024-05-21

## 2024-05-21 NOTE — PROGRESS NOTES
Subjective     Vannessa Contreras is a 58 y.o. female who presents to day for 7 month follow up  and Hypertension.    CHIEF COMPLIANT  Chief Complaint   Patient presents with    7 month follow up     Hypertension       Active Problems:  Problem List Items Addressed This Visit          Cardiac and Vasculature    CAD (coronary artery disease) - Primary    Overview     1.1. Nonobstructive Cath 2007         PVD (peripheral vascular disease)    Overview     5.1 Bilateral aortobifemoral bypass 6/2007; thrombectomy rt aortofemoral graft 11/2009            Other Visit Diagnoses       Pre-diabetes        Relevant Medications    Semaglutide,0.25 or 0.5MG/DOS, (Ozempic, 0.25 or 0.5 MG/DOSE,) 2 MG/1.5ML solution pen-injector    Elevated glucose        Relevant Medications    Semaglutide,0.25 or 0.5MG/DOS, (Ozempic, 0.25 or 0.5 MG/DOSE,) 2 MG/1.5ML solution pen-injector    Adverse effect of metformin, initial encounter        Relevant Medications    Semaglutide,0.25 or 0.5MG/DOS, (Ozempic, 0.25 or 0.5 MG/DOSE,) 2 MG/1.5ML solution pen-injector          Problem List :   1. Hyperlipidemia  2.Hypertension  2.1 echocardiogram 3/20: EF 56 to 60% grade 2 diastolic dysfunction no hemodynamically significant valvular disease  3.Grade 2 diastolic dysfunction  4.R leg swelling  4.1 arterial duplex of the lower extremities 12/19: 50% bilateral SFAs  5.Anticoagulated on eliquis  6.  Carotid duplex 2/20: Nonobstructive carotid artery disease  7.  Left heart cath 2007: Left main normal,, diagonal and LAD system normal, RCA normal, circumflex normal, EF 55 to 60%, LVEDP 12-16    HPI  HPI  Vannessa Contreras is a 58-year-old female patient who is being followed up today for chronic arterial hypertension and peripheral vascular disease.    Patient does have a history of chronic arterial hypertension which has been treated with amlodipine, losartan, hydrochlorothiazide, and metoprolol.  Today her blood pressure is well-controlled at 111/51 heart rate  is 64.  She denies any associated symptoms with her chronic arterial hypertension.    Patient does have extensive history of peripheral vascular disease in which she underwent aortobifem as well as a thrombectomy.  She is chronically anticoagulated on Eliquis.  She denies any claudication-like symptoms at this time.  She did have an arterial duplex that showed about 50% in her bilateral SFAs and 2019.    Overall she seems to be doing relatively well from a cardiovascular standpoint.  She denies any angina anginal equivalent symptoms.  Patient denies any chest pain, shortness of breath, palpitations, lower extremity edema, fatigue, syncope, PND, orthopnea, or strokelike symptoms.  PRIOR MEDS  Current Outpatient Medications on File Prior to Visit   Medication Sig Dispense Refill    amLODIPine (NORVASC) 10 MG tablet Take 1 tablet by mouth Daily. 90 tablet 3    apixaban (ELIQUIS) 5 MG tablet tablet Take 1 tablet by mouth 2 (Two) Times a Day. 180 tablet 3    aspirin (ASPIRIN LOW DOSE) 81 MG EC tablet Take 1 tablet by mouth 2 (Two) Times a Day.      atorvastatin (LIPITOR) 40 MG tablet Take 1 tablet by mouth every night at bedtime. 90 tablet 3    cetirizine (zyrTEC) 10 MG tablet Take 1 tablet by mouth Daily.      furosemide (LASIX) 20 MG tablet Take 1 tablet by mouth 2 (Two) Times a Day. 180 tablet 3    losartan-hydrochlorothiazide (Hyzaar) 100-25 MG per tablet Take 1 tablet by mouth Daily. 90 tablet 3    metFORMIN (GLUCOPHAGE) 500 MG tablet Take 1 tablet by mouth Daily.      metoprolol tartrate (LOPRESSOR) 50 MG tablet Take 1 tablet by mouth 2 (Two) Times a Day. 90 tablet 3    Multiple Vitamins-Minerals (MULTIVITAMIN ADULT PO) Take  by mouth Daily.      nitroglycerin (NITROSTAT) 0.4 MG SL tablet Place 1 tablet under the tongue Every 5 (Five) Minutes As Needed.      Omega-3 Fatty Acids (EQL FISH OIL) 1200 MG capsule Take 1 tablet by mouth 2 (Two) Times a Day.      potassium chloride 10 MEQ CR tablet Take 1 tablet by mouth  Daily. 90 tablet 3    [DISCONTINUED] meloxicam (MOBIC) 15 MG tablet Take 1 tablet by mouth Daily.       No current facility-administered medications on file prior to visit.       ALLERGIES  Patient has no known allergies.    HISTORY  Past Medical History:   Diagnosis Date    Arterial insufficiency     Atherosclerosis of arteries of extremities     CAD (coronary artery disease)     DVT (deep venous thrombosis)     h.o - on coumadin     Fatigue     Hyperlipidemia     Hypertension     Palpitations     Prediabetes     PVD (peripheral vascular disease)     SOB (shortness of breath)        Social History     Socioeconomic History    Marital status:    Tobacco Use    Smoking status: Every Day     Current packs/day: 1.00     Types: Cigarettes    Smokeless tobacco: Never   Substance and Sexual Activity    Alcohol use: Never     Comment: Social     Drug use: Never    Sexual activity: Defer       Family History   Problem Relation Age of Onset    Atrial fibrillation Mother     Heart attack Father     Diabetes Sister     Hypertension Sister     Stroke Maternal Grandmother     Peripheral vascular disease Other        Review of Systems   Constitutional:  Negative for chills, fatigue and fever.   HENT:  Negative for congestion, rhinorrhea and sore throat.    Eyes:  Negative for visual disturbance.   Respiratory:  Negative for apnea, chest tightness and shortness of breath.    Cardiovascular:  Negative for chest pain, palpitations and leg swelling.   Gastrointestinal:  Positive for diarrhea. Negative for constipation and nausea.   Musculoskeletal:  Positive for arthralgias and neck pain (occasional). Negative for back pain.   Allergic/Immunologic: Positive for environmental allergies. Negative for food allergies.   Neurological:  Negative for dizziness, syncope, weakness and light-headedness.   Hematological:  Does not bruise/bleed easily.   Psychiatric/Behavioral:  Negative for sleep disturbance.        Objective     VITALS:  "/51 (BP Location: Left arm, Patient Position: Sitting, Cuff Size: Adult)   Pulse 64   Ht 167.6 cm (65.98\")   Wt 93 kg (205 lb)   SpO2 96%   BMI 33.10 kg/m²     LABS:   Lab Results (most recent)       None            IMAGING:   No Images in the past 120 days found..    EXAM:  Physical Exam  Vitals and nursing note reviewed.   Constitutional:       Appearance: She is well-developed.   HENT:      Head: Normocephalic.   Neck:      Thyroid: No thyroid mass.      Vascular: No carotid bruit or JVD.      Trachea: Trachea and phonation normal.   Cardiovascular:      Rate and Rhythm: Normal rate and regular rhythm.      Pulses:           Radial pulses are 2+ on the right side and 2+ on the left side.        Dorsalis pedis pulses are 2+ on the right side and 1+ on the left side.      Heart sounds: Normal heart sounds. No murmur heard.     No friction rub. No gallop.   Pulmonary:      Effort: Pulmonary effort is normal. No respiratory distress.      Breath sounds: Normal breath sounds. No wheezing or rales.   Musculoskeletal:         General: No swelling. Normal range of motion.      Cervical back: Neck supple.   Skin:     General: Skin is warm and dry.      Capillary Refill: Capillary refill takes less than 2 seconds.      Findings: No rash.   Neurological:      Mental Status: She is alert and oriented to person, place, and time.   Psychiatric:         Speech: Speech normal.         Behavior: Behavior normal.         Thought Content: Thought content normal.         Judgment: Judgment normal.         Procedure   Procedures       Assessment & Plan    Diagnosis Plan   1. Coronary artery disease involving native coronary artery of native heart without angina pectoris        2. PVD (peripheral vascular disease)        3. Pre-diabetes  Semaglutide,0.25 or 0.5MG/DOS, (Ozempic, 0.25 or 0.5 MG/DOSE,) 2 MG/1.5ML solution pen-injector      4. Elevated glucose  Semaglutide,0.25 or 0.5MG/DOS, (Ozempic, 0.25 or 0.5 MG/DOSE,) 2 " MG/1.5ML solution pen-injector      5. Adverse effect of metformin, initial encounter  Semaglutide,0.25 or 0.5MG/DOS, (Ozempic, 0.25 or 0.5 MG/DOSE,) 2 MG/1.5ML solution pen-injector      1.  Patient does have a history of nonobstructive coronary artery disease which she seems to be doing well.  She denies any angina anginal equivalent symptoms.  Will continue to monitor at this time.  2.  Patient does have a history of peripheral vascular disease including aortobifem.  The right pulse is about a 2+ in the left is about a 1+.  She denies any claudication-like symptoms.  If she does experience any claudication-like symptoms she will notify us we will move forth with further testing at that time.  3.  Patient has been intolerant to metformin where she is having frequent diarrheas.  She stopped the medication and the diarrhea will go away.  She does request something to replace her metformin for her prediabetes and elevated glucose.  We will prescribe her Ozempic.  4.  Patient's blood pressure is controlled on current blood pressure medication regimen.  No medication changes are warranted at this time.  Patient advised to monitor blood pressure on a daily basis and report any persistent highs or lows.  Set goal blood pressure for patient at 130/80 or below.  5.  Will get a laboratory workup including CBC CMP TSH, magnesium, lipid panel, and hemoglobin A1c.  6.  Informed of signs and symptoms of ACS and advised to seek emergent treatment for any new worsening symptoms.  Patient also advised sooner follow-up as needed.  Also advised to follow-up with family doctor as needed  This note is dictated utilizing voice recognition software.  Although this record has been proof read, transcriptional errors may still be present. If questions occur regarding the content of this record please do not hesitate to call our office.  I have reviewed and confirmed the accuracy of the ROS as documented by the MA/LPN/RN Rush Cruz,  APRN    No follow-ups on file.    Diagnoses and all orders for this visit:    1. Coronary artery disease involving native coronary artery of native heart without angina pectoris (Primary)    2. PVD (peripheral vascular disease)    3. Pre-diabetes  -     Semaglutide,0.25 or 0.5MG/DOS, (Ozempic, 0.25 or 0.5 MG/DOSE,) 2 MG/1.5ML solution pen-injector; Inject 0.25 mg under the skin into the appropriate area as directed 1 (One) Time Per Week.  Dispense: 1.5 mL; Refill: 5    4. Elevated glucose  -     Semaglutide,0.25 or 0.5MG/DOS, (Ozempic, 0.25 or 0.5 MG/DOSE,) 2 MG/1.5ML solution pen-injector; Inject 0.25 mg under the skin into the appropriate area as directed 1 (One) Time Per Week.  Dispense: 1.5 mL; Refill: 5    5. Adverse effect of metformin, initial encounter  -     Semaglutide,0.25 or 0.5MG/DOS, (Ozempic, 0.25 or 0.5 MG/DOSE,) 2 MG/1.5ML solution pen-injector; Inject 0.25 mg under the skin into the appropriate area as directed 1 (One) Time Per Week.  Dispense: 1.5 mL; Refill: 5        Vannessa Contreras  reports that she has been smoking cigarettes. She has never used smokeless tobacco. I have educated her on the risk of diseases from using tobacco products such as cancer, COPD, and heart disease. Patient smokes pack to pack and a half a day.      I advised her to quit and she is not willing to quit.    I spent 5.5 minutes counseling the patient.           BMI is >= 30 and <35. (Class 1 Obesity). The following options were offered after discussion;: weight loss educational material (shared in after visit summary) and exercise counseling/recommendations           MEDS ORDERED DURING VISIT:  New Medications Ordered This Visit   Medications    Semaglutide,0.25 or 0.5MG/DOS, (Ozempic, 0.25 or 0.5 MG/DOSE,) 2 MG/1.5ML solution pen-injector     Sig: Inject 0.25 mg under the skin into the appropriate area as directed 1 (One) Time Per Week.     Dispense:  1.5 mL     Refill:  5           This document has been electronically  signed by Rush Cruz Jr., APRN  May 21, 2024 09:33 EDT

## 2024-05-22 ENCOUNTER — TELEPHONE (OUTPATIENT)
Dept: CARDIOLOGY | Facility: CLINIC | Age: 59
End: 2024-05-22
Payer: MEDICARE

## 2024-05-22 NOTE — PROGRESS NOTES
Elevated hemoglobin A1c at 5.9    CMP identified a glucose of 102, creatinine of 1.10 which is up from 0.73 1 year ago.  Other electrolytes within normal range.  Her ALT is increased from 57 from 46, AST is increased from 42-45, alkaline phosphate continues to be elevated at 128 which is actually decreased.  Her cholesterol panel identified triglycerides improved to 132, HDL improved to 39, LDL is improved to 56.  Continue current medication regimen without change.  Normal TSH and magnesium.  Keep follow-up.

## 2024-05-22 NOTE — TELEPHONE ENCOUNTER
I called patient and went over lab results as follows :    Elevated hemoglobin A1c at 5.9     CMP identified a glucose of 102, creatinine of 1.10 which is up from 0.73 1 year ago.  Other electrolytes within normal range.  Her ALT is increased from 57 from 46, AST is increased from 42-45, alkaline phosphate continues to be elevated at 128 which is actually decreased.  Her cholesterol panel identified triglycerides improved to 132, HDL improved to 39, LDL is improved to 56.  Continue current medication regimen without change.  Normal TSH and magnesium.  Keep follow-up.

## 2024-08-20 ENCOUNTER — TELEPHONE (OUTPATIENT)
Dept: CARDIOLOGY | Facility: CLINIC | Age: 59
End: 2024-08-20
Payer: MEDICARE

## 2024-08-20 NOTE — TELEPHONE ENCOUNTER
Received cardiac clearance request from DR ITA EDWARDS stating pt has COLONOSCOPY scheduled for 09/05/2024 and is requiring a cardiac clearance. Placed cardiac clearance request in MCKAYLA'S inbox to review and address with provider.

## 2024-09-23 NOTE — PROGRESS NOTES
NO DOSAGE CHANGE AND RECHECK LEVEL IN 1 MO. PER TIERA GARCIA, PAC. VERBAL ORDERS READ BACK AND VERIFIED BY TIERA GARCIA, PAC. PATIENT'S  AWARE VERBALIZED OK. PH,LPN   [Symptom and Test Evaluation] : symptom and test evaluation [Hyperlipidemia] : hyperlipidemia [Hypertension] : hypertension [Coronary Artery Disease] : coronary artery disease

## 2024-10-15 NOTE — PROGRESS NOTES
Mild elevation of liver function.  Increased a little from last time.  Otherwise labs are within normal limits. [FreeTextEntry1] : This is a 79 year old  female who was found to have a new nodule on a routine right mammogram in 2017. An ultrasound guided biopsy showed an invasive cancer.  She underwent a Saviscout localized right breast lumpectomy with sentinel node biopsy in 2/2017.  She had a 5 mm invasive ductal cancer, SBR 3-4/9, ER >90% TN >90% Her 2 neg, SLN neg x1.   She completed radiation, but developed a severe rash over her right breast and abdomen, the etiology of which was unclear.  She was only on Arimidex for a few days.  She required steroids and antibiotics and she had a skin biopsy of the abdominal skin with the dermatologist.  She was told the skin biopsy indicated a bug bite.  She took exemestane for a month, but had severe joint pains, so stopped it and tried letrozole. She stopped that also due to joint pains. She was on tamoxifen and completed 5 years of therapy.   She has no current complaints except for the asymmetry.

## 2024-11-24 DIAGNOSIS — T38.3X5A ADVERSE EFFECT OF METFORMIN, INITIAL ENCOUNTER: ICD-10-CM

## 2024-11-24 DIAGNOSIS — R73.03 PRE-DIABETES: ICD-10-CM

## 2024-11-24 DIAGNOSIS — R73.09 ELEVATED GLUCOSE: ICD-10-CM

## 2024-11-25 RX ORDER — SEMAGLUTIDE 0.68 MG/ML
INJECTION, SOLUTION SUBCUTANEOUS
Qty: 3 ML | Refills: 11 | Status: SHIPPED | OUTPATIENT
Start: 2024-11-25

## 2025-01-22 ENCOUNTER — OFFICE VISIT (OUTPATIENT)
Dept: CARDIOLOGY | Facility: CLINIC | Age: 60
End: 2025-01-22
Payer: MEDICARE

## 2025-01-22 VITALS
OXYGEN SATURATION: 98 % | SYSTOLIC BLOOD PRESSURE: 100 MMHG | HEART RATE: 65 BPM | WEIGHT: 202.4 LBS | HEIGHT: 66 IN | BODY MASS INDEX: 32.53 KG/M2 | DIASTOLIC BLOOD PRESSURE: 60 MMHG

## 2025-01-22 DIAGNOSIS — I10 PRIMARY HYPERTENSION: ICD-10-CM

## 2025-01-22 DIAGNOSIS — I10 ESSENTIAL HYPERTENSION: ICD-10-CM

## 2025-01-22 DIAGNOSIS — Z86.718 HISTORY OF DEEP VEIN THROMBOSIS (DVT) OF LOWER EXTREMITY: ICD-10-CM

## 2025-01-22 DIAGNOSIS — T38.3X5A ADVERSE EFFECT OF METFORMIN, INITIAL ENCOUNTER: ICD-10-CM

## 2025-01-22 DIAGNOSIS — R73.03 PRE-DIABETES: ICD-10-CM

## 2025-01-22 DIAGNOSIS — J06.9 UPPER RESPIRATORY TRACT INFECTION, UNSPECIFIED TYPE: ICD-10-CM

## 2025-01-22 DIAGNOSIS — M79.89 RIGHT LEG SWELLING: ICD-10-CM

## 2025-01-22 DIAGNOSIS — E78.2 MIXED HYPERLIPIDEMIA: ICD-10-CM

## 2025-01-22 DIAGNOSIS — R73.09 ELEVATED GLUCOSE: ICD-10-CM

## 2025-01-22 DIAGNOSIS — I51.89 GRADE II DIASTOLIC DYSFUNCTION: ICD-10-CM

## 2025-01-22 RX ORDER — SEMAGLUTIDE 0.68 MG/ML
0.25 INJECTION, SOLUTION SUBCUTANEOUS WEEKLY
Qty: 3 ML | Refills: 11 | Status: SHIPPED | OUTPATIENT
Start: 2025-01-22

## 2025-01-22 RX ORDER — ATORVASTATIN CALCIUM 40 MG/1
40 TABLET, FILM COATED ORAL
Qty: 90 TABLET | Refills: 3 | Status: SHIPPED | OUTPATIENT
Start: 2025-01-22

## 2025-01-22 RX ORDER — METOPROLOL TARTRATE 50 MG
50 TABLET ORAL 2 TIMES DAILY
Qty: 90 TABLET | Refills: 3 | Status: SHIPPED | OUTPATIENT
Start: 2025-01-22

## 2025-01-22 RX ORDER — FUROSEMIDE 20 MG/1
20 TABLET ORAL 2 TIMES DAILY
Qty: 180 TABLET | Refills: 3 | Status: SHIPPED | OUTPATIENT
Start: 2025-01-22

## 2025-01-22 RX ORDER — LOSARTAN POTASSIUM 100 MG/1
100 TABLET ORAL DAILY
Qty: 90 TABLET | Refills: 3 | Status: SHIPPED | OUTPATIENT
Start: 2025-01-22

## 2025-01-22 RX ORDER — POTASSIUM CHLORIDE 750 MG/1
10 TABLET, EXTENDED RELEASE ORAL DAILY
Qty: 90 TABLET | Refills: 3 | Status: SHIPPED | OUTPATIENT
Start: 2025-01-22

## 2025-01-22 RX ORDER — AMLODIPINE BESYLATE 10 MG/1
10 TABLET ORAL DAILY
Qty: 90 TABLET | Refills: 3 | Status: SHIPPED | OUTPATIENT
Start: 2025-01-22

## 2025-01-22 RX ORDER — LOSARTAN POTASSIUM AND HYDROCHLOROTHIAZIDE 25; 100 MG/1; MG/1
1 TABLET ORAL DAILY
Qty: 90 TABLET | Refills: 3 | Status: SHIPPED | OUTPATIENT
Start: 2025-01-22 | End: 2025-01-22

## 2025-01-22 NOTE — PROGRESS NOTES
Subjective     Vannessa Contreras is a 59 y.o. female who presents to day for Follow-up (Cardiac management -8 month follow up //Has has a low dose CT Scan done at [facility] ordered by Dr. Lewis`s office - she has not got results back yet ), labs (Last set 5/2024- has had updated labs in 2025 - results requested ), Coronary Artery Disease (Echo 2020 / carotid ultrasound 2020//She states no chest pain / tightness or palpitations at this time ), and medication (Went over verbally //Daily aspirin //Refills pended //Patient did not bring med list or medicine bottles to appointment, med list has been reviewed and updated based on patient's knowledge of their meds.  ).    CHIEF COMPLIANT  Chief Complaint   Patient presents with    Follow-up     Cardiac management -8 month follow up     Has has a low dose CT Scan done at [facility] ordered by Dr. Lewis`s office - she has not got results back yet     labs     Last set 5/2024- has had updated labs in 2025 - results requested     Coronary Artery Disease     Echo 2020 / carotid ultrasound 2020    She states no chest pain / tightness or palpitations at this time     medication     Went over verbally     Daily aspirin     Refills pended     Patient did not bring med list or medicine bottles to appointment, med list has been reviewed and updated based on patient's knowledge of their meds.         Active Problems:  1. Hyperlipidemia  2.Hypertension  2.1 echocardiogram 3/20: EF 56 to 60% grade 2 diastolic dysfunction no hemodynamically significant valvular disease  3.Grade 2 diastolic dysfunction  4.R leg swelling  4.1 arterial duplex of the lower extremities 12/19: 50% bilateral SFAs  5.Anticoagulated on eliquis  6.  Carotid duplex 2/20: Nonobstructive carotid artery disease  7.  Left heart cath 2007: Left main normal,, diagonal and LAD system normal, RCA normal, circumflex normal, EF 55 to 60%, LVEDP 12-16    HPI  HPI  Vannessa Contreras is a 59-year-old female patient who is being followed up  today for chronic arterial hypertension and peripheral vascular disease.     Patient does have a history of chronic arterial hypertension which has been treated with amlodipine, losartan, hydrochlorothiazide, and metoprolol.  Today her blood pressure is well-controlled at 100/60 and heart rate of 65.  She does report periods of weakness that could be associated with low blood pressures.    Patient does have extensive history of peripheral vascular disease in which she underwent aortobifem as well as a thrombectomy.  She is chronically anticoagulated on Eliquis.  She denies any claudication-like symptoms at this time.  She did have an arterial duplex that showed about 50% in her bilateral SFAs and 2019.    Overall she is doing well from a cardiovascular standpoint.  She denies any angina anginal equivalent symptoms.  Patient denies any chest pain, shortness of breath, palpitations, lower extremity edema, fatigue, syncope, PND, orthopnea, or strokelike symptoms.  PRIOR MEDS  Current Outpatient Medications on File Prior to Visit   Medication Sig Dispense Refill    aspirin (ASPIRIN LOW DOSE) 81 MG EC tablet Take 1 tablet by mouth 2 (Two) Times a Day.      cetirizine (zyrTEC) 10 MG tablet Take 1 tablet by mouth Daily.      Multiple Vitamins-Minerals (MULTIVITAMIN ADULT PO) Take  by mouth Daily.      nitroglycerin (NITROSTAT) 0.4 MG SL tablet Place 1 tablet under the tongue Every 5 (Five) Minutes As Needed.      Omega-3 Fatty Acids (EQL FISH OIL) 1200 MG capsule Take 1 tablet by mouth 2 (Two) Times a Day.      [DISCONTINUED] amLODIPine (NORVASC) 10 MG tablet Take 1 tablet by mouth Daily. 90 tablet 3    [DISCONTINUED] apixaban (ELIQUIS) 5 MG tablet tablet Take 1 tablet by mouth 2 (Two) Times a Day. 180 tablet 3    [DISCONTINUED] atorvastatin (LIPITOR) 40 MG tablet Take 1 tablet by mouth every night at bedtime. 90 tablet 3    [DISCONTINUED] furosemide (LASIX) 20 MG tablet Take 1 tablet by mouth 2 (Two) Times a Day. 180  tablet 3    [DISCONTINUED] losartan-hydrochlorothiazide (Hyzaar) 100-25 MG per tablet Take 1 tablet by mouth Daily. 90 tablet 3    [DISCONTINUED] metoprolol tartrate (LOPRESSOR) 50 MG tablet Take 1 tablet by mouth 2 (Two) Times a Day. 90 tablet 3    [DISCONTINUED] potassium chloride 10 MEQ CR tablet Take 1 tablet by mouth Daily. 90 tablet 3    [DISCONTINUED] Semaglutide,0.25 or 0.5MG/DOS, (Ozempic, 0.25 or 0.5 MG/DOSE,) 2 MG/3ML solution pen-injector DIAL AND INJECT UNDER THE SKIN 0.25 MG WEEKLY 3 mL 11    [DISCONTINUED] metFORMIN (GLUCOPHAGE) 500 MG tablet Take 1 tablet by mouth Daily. (Patient not taking: Reported on 1/22/2025)       No current facility-administered medications on file prior to visit.       ALLERGIES  Patient has no known allergies.    HISTORY  Past Medical History:   Diagnosis Date    Arterial insufficiency     Atherosclerosis of arteries of extremities     CAD (coronary artery disease)     DVT (deep venous thrombosis)     h.o - on coumadin     Fatigue     Hyperlipidemia     Hypertension     Palpitations     Prediabetes     PVD (peripheral vascular disease)     SOB (shortness of breath)        Social History     Socioeconomic History    Marital status:    Tobacco Use    Smoking status: Every Day     Current packs/day: 1.00     Types: Cigarettes    Smokeless tobacco: Never   Vaping Use    Vaping status: Never Used   Substance and Sexual Activity    Alcohol use: Never     Comment: Social     Drug use: Never    Sexual activity: Defer       Family History   Problem Relation Age of Onset    Atrial fibrillation Mother     Heart attack Father     Diabetes Sister     Hypertension Sister     Stroke Maternal Grandmother     Peripheral vascular disease Other        Review of Systems   Constitutional: Negative.  Negative for chills, fatigue and fever.   HENT: Negative.  Negative for congestion, rhinorrhea and sneezing.    Eyes: Negative.  Negative for visual disturbance.   Respiratory: Negative.   "Negative for chest tightness, shortness of breath and wheezing.    Cardiovascular: Negative.  Negative for chest pain, palpitations and leg swelling.   Gastrointestinal: Negative.    Endocrine: Negative.    Genitourinary: Negative.    Musculoskeletal: Negative.  Negative for arthralgias, back pain and neck pain.   Skin: Negative.    Allergic/Immunologic: Positive for environmental allergies.   Neurological: Negative.  Negative for dizziness, weakness, numbness and headaches.   Hematological: Negative.  Does not bruise/bleed easily.   Psychiatric/Behavioral: Negative.  Negative for sleep disturbance.        Objective     VITALS: /60 (BP Location: Left arm, Patient Position: Sitting, Cuff Size: Adult)   Pulse 65   Ht 167.6 cm (65.98\")   Wt 91.8 kg (202 lb 6.4 oz)   SpO2 98%   BMI 32.69 kg/m²     LABS:   Lab Results (most recent)       None            IMAGING:   No Images in the past 120 days found..    EXAM:  Physical Exam  Vitals and nursing note reviewed.   Constitutional:       Appearance: She is well-developed.   HENT:      Head: Normocephalic.   Neck:      Thyroid: No thyroid mass.      Vascular: No carotid bruit or JVD.      Trachea: Trachea and phonation normal.   Cardiovascular:      Rate and Rhythm: Normal rate and regular rhythm.      Pulses:           Radial pulses are 2+ on the right side and 2+ on the left side.        Posterior tibial pulses are 2+ on the right side and 2+ on the left side.      Heart sounds: Normal heart sounds. No murmur heard.     No friction rub. No gallop.   Pulmonary:      Effort: Pulmonary effort is normal. No respiratory distress.      Breath sounds: Normal breath sounds. No wheezing or rales.   Musculoskeletal:         General: No swelling. Normal range of motion.      Cervical back: Neck supple.   Skin:     General: Skin is warm and dry.      Capillary Refill: Capillary refill takes less than 2 seconds.      Findings: No rash.   Neurological:      Mental Status: She " is alert and oriented to person, place, and time.   Psychiatric:         Speech: Speech normal.         Behavior: Behavior normal.         Thought Content: Thought content normal.         Judgment: Judgment normal.         Procedure   Procedures       Assessment & Plan    Diagnosis Plan   1. Primary hypertension  metoprolol tartrate (LOPRESSOR) 50 MG tablet    potassium chloride 10 MEQ CR tablet      2. Mixed hyperlipidemia  atorvastatin (LIPITOR) 40 MG tablet    potassium chloride 10 MEQ CR tablet      3. History of deep vein thrombosis (DVT) of lower extremity  apixaban (ELIQUIS) 5 MG tablet tablet      4. Essential hypertension  amLODIPine (NORVASC) 10 MG tablet    potassium chloride 10 MEQ CR tablet      5. Grade II diastolic dysfunction  furosemide (LASIX) 20 MG tablet    potassium chloride 10 MEQ CR tablet      6. Upper respiratory tract infection, unspecified type  potassium chloride 10 MEQ CR tablet      7. Right leg swelling  potassium chloride 10 MEQ CR tablet      8. Pre-diabetes  Semaglutide,0.25 or 0.5MG/DOS, (Ozempic, 0.25 or 0.5 MG/DOSE,) 2 MG/3ML solution pen-injector      9. Elevated glucose  Semaglutide,0.25 or 0.5MG/DOS, (Ozempic, 0.25 or 0.5 MG/DOSE,) 2 MG/3ML solution pen-injector      10. Adverse effect of metformin, initial encounter  Semaglutide,0.25 or 0.5MG/DOS, (Ozempic, 0.25 or 0.5 MG/DOSE,) 2 MG/3ML solution pen-injector      1.  Patient's blood pressure is controlled on current blood pressure medication regimen.  Due to low normal blood pressures as well as appearance of weakness we will discontinue the hydrochlorothiazide and just move forth with losartan..  Patient advised to monitor blood pressure on a daily basis and report any persistent highs or lows.  Set goal blood pressure for patient at 130/80 or below.  2.  Patient does have significant peripheral vascular disease in which she is chronically anticoagulated with Eliquis.  She denies any signs or symptoms of bleeding.  3.   Informed of signs and symptoms of ACS and advised to seek emergent treatment for any new worsening symptoms.  Patient also advised sooner follow-up as needed.  Also advised to follow-up with family doctor as needed  This note is dictated utilizing voice recognition software.  Although this record has been proof read, transcriptional errors may still be present. If questions occur regarding the content of this record please do not hesitate to call our office.  I have reviewed and confirmed the accuracy of the ROS as documented by the MA/LPN/RN JAMAAL Salazar  No follow-ups on file.    Diagnoses and all orders for this visit:    1. Primary hypertension  -     metoprolol tartrate (LOPRESSOR) 50 MG tablet; Take 1 tablet by mouth 2 (Two) Times a Day.  Dispense: 90 tablet; Refill: 3  -     potassium chloride 10 MEQ CR tablet; Take 1 tablet by mouth Daily.  Dispense: 90 tablet; Refill: 3    2. Mixed hyperlipidemia  -     atorvastatin (LIPITOR) 40 MG tablet; Take 1 tablet by mouth every night at bedtime.  Dispense: 90 tablet; Refill: 3  -     potassium chloride 10 MEQ CR tablet; Take 1 tablet by mouth Daily.  Dispense: 90 tablet; Refill: 3    3. History of deep vein thrombosis (DVT) of lower extremity  -     apixaban (ELIQUIS) 5 MG tablet tablet; Take 1 tablet by mouth 2 (Two) Times a Day.  Dispense: 180 tablet; Refill: 3    4. Essential hypertension  -     amLODIPine (NORVASC) 10 MG tablet; Take 1 tablet by mouth Daily.  Dispense: 90 tablet; Refill: 3  -     potassium chloride 10 MEQ CR tablet; Take 1 tablet by mouth Daily.  Dispense: 90 tablet; Refill: 3    5. Grade II diastolic dysfunction  -     furosemide (LASIX) 20 MG tablet; Take 1 tablet by mouth 2 (Two) Times a Day.  Dispense: 180 tablet; Refill: 3  -     potassium chloride 10 MEQ CR tablet; Take 1 tablet by mouth Daily.  Dispense: 90 tablet; Refill: 3    6. Upper respiratory tract infection, unspecified type  -     potassium chloride 10 MEQ CR tablet; Take 1  tablet by mouth Daily.  Dispense: 90 tablet; Refill: 3    7. Right leg swelling  -     potassium chloride 10 MEQ CR tablet; Take 1 tablet by mouth Daily.  Dispense: 90 tablet; Refill: 3    8. Pre-diabetes  -     Semaglutide,0.25 or 0.5MG/DOS, (Ozempic, 0.25 or 0.5 MG/DOSE,) 2 MG/3ML solution pen-injector; Inject 0.25 mg under the skin into the appropriate area as directed 1 (One) Time Per Week.  Dispense: 3 mL; Refill: 11    9. Elevated glucose  -     Semaglutide,0.25 or 0.5MG/DOS, (Ozempic, 0.25 or 0.5 MG/DOSE,) 2 MG/3ML solution pen-injector; Inject 0.25 mg under the skin into the appropriate area as directed 1 (One) Time Per Week.  Dispense: 3 mL; Refill: 11    10. Adverse effect of metformin, initial encounter  -     Semaglutide,0.25 or 0.5MG/DOS, (Ozempic, 0.25 or 0.5 MG/DOSE,) 2 MG/3ML solution pen-injector; Inject 0.25 mg under the skin into the appropriate area as directed 1 (One) Time Per Week.  Dispense: 3 mL; Refill: 11    Other orders  -     losartan-hydrochlorothiazide (Hyzaar) 100-25 MG per tablet; Take 1 tablet by mouth Daily.  Dispense: 90 tablet; Refill: 3        Vannessa Contreras  reports that she has been smoking cigarettes. She has never used smokeless tobacco. I have educated her on the risk of diseases from using tobacco products such as cancer, COPD, and heart disease.     I advised her to quit and she is not willing to quit.    I spent 3.5 minutes counseling the patient.                       MEDS ORDERED DURING VISIT:  New Medications Ordered This Visit   Medications    losartan-hydrochlorothiazide (Hyzaar) 100-25 MG per tablet     Sig: Take 1 tablet by mouth Daily.     Dispense:  90 tablet     Refill:  3    metoprolol tartrate (LOPRESSOR) 50 MG tablet     Sig: Take 1 tablet by mouth 2 (Two) Times a Day.     Dispense:  90 tablet     Refill:  3    atorvastatin (LIPITOR) 40 MG tablet     Sig: Take 1 tablet by mouth every night at bedtime.     Dispense:  90 tablet     Refill:  3    apixaban  (ELIQUIS) 5 MG tablet tablet     Sig: Take 1 tablet by mouth 2 (Two) Times a Day.     Dispense:  180 tablet     Refill:  3    amLODIPine (NORVASC) 10 MG tablet     Sig: Take 1 tablet by mouth Daily.     Dispense:  90 tablet     Refill:  3    furosemide (LASIX) 20 MG tablet     Sig: Take 1 tablet by mouth 2 (Two) Times a Day.     Dispense:  180 tablet     Refill:  3    potassium chloride 10 MEQ CR tablet     Sig: Take 1 tablet by mouth Daily.     Dispense:  90 tablet     Refill:  3    Semaglutide,0.25 or 0.5MG/DOS, (Ozempic, 0.25 or 0.5 MG/DOSE,) 2 MG/3ML solution pen-injector     Sig: Inject 0.25 mg under the skin into the appropriate area as directed 1 (One) Time Per Week.     Dispense:  3 mL     Refill:  11           This document has been electronically signed by Rush Cruz Jr., APRN  January 22, 2025 09:34 EST

## 2025-07-22 ENCOUNTER — OFFICE VISIT (OUTPATIENT)
Dept: CARDIOLOGY | Facility: CLINIC | Age: 60
End: 2025-07-22
Payer: MEDICARE

## 2025-07-22 VITALS
DIASTOLIC BLOOD PRESSURE: 55 MMHG | BODY MASS INDEX: 33.49 KG/M2 | WEIGHT: 208.4 LBS | HEART RATE: 68 BPM | OXYGEN SATURATION: 96 % | HEIGHT: 66 IN | SYSTOLIC BLOOD PRESSURE: 112 MMHG

## 2025-07-22 DIAGNOSIS — I51.89 GRADE II DIASTOLIC DYSFUNCTION: ICD-10-CM

## 2025-07-22 DIAGNOSIS — E78.2 MIXED HYPERLIPIDEMIA: ICD-10-CM

## 2025-07-22 DIAGNOSIS — I73.9 PVD (PERIPHERAL VASCULAR DISEASE): ICD-10-CM

## 2025-07-22 DIAGNOSIS — Z86.718 HISTORY OF DEEP VEIN THROMBOSIS (DVT) OF LOWER EXTREMITY: ICD-10-CM

## 2025-07-22 DIAGNOSIS — R42 DIZZY: ICD-10-CM

## 2025-07-22 DIAGNOSIS — I10 PRIMARY HYPERTENSION: Primary | ICD-10-CM

## 2025-07-22 DIAGNOSIS — R09.89 DECREASED DORSALIS PEDIS PULSE: ICD-10-CM

## 2025-07-22 PROCEDURE — 3078F DIAST BP <80 MM HG: CPT | Performed by: NURSE PRACTITIONER

## 2025-07-22 PROCEDURE — 99214 OFFICE O/P EST MOD 30 MIN: CPT | Performed by: NURSE PRACTITIONER

## 2025-07-22 PROCEDURE — 93000 ELECTROCARDIOGRAM COMPLETE: CPT | Performed by: NURSE PRACTITIONER

## 2025-07-22 PROCEDURE — 3074F SYST BP LT 130 MM HG: CPT | Performed by: NURSE PRACTITIONER

## 2025-07-22 PROCEDURE — 1160F RVW MEDS BY RX/DR IN RCRD: CPT | Performed by: NURSE PRACTITIONER

## 2025-07-22 PROCEDURE — 1159F MED LIST DOCD IN RCRD: CPT | Performed by: NURSE PRACTITIONER

## 2025-07-22 RX ORDER — AMLODIPINE BESYLATE 5 MG/1
5 TABLET ORAL DAILY
Qty: 90 TABLET | Refills: 3 | Status: SHIPPED | OUTPATIENT
Start: 2025-07-22

## 2025-07-22 NOTE — PROGRESS NOTES
Subjective     Vannessa Contreras is a 59 y.o. female who presents to day for Follow-up, Hypertension, and Coronary Artery Disease.    CHIEF COMPLIANT  Chief Complaint   Patient presents with    Follow-up    Hypertension    Coronary Artery Disease       Active Problems:  Problem List Items Addressed This Visit          Cardiac and Vasculature    Hyperlipidemia    Relevant Orders    ECG 12 Lead    Hypertension - Primary    Relevant Medications    amLODIPine (NORVASC) 5 MG tablet    Other Relevant Orders    ECG 12 Lead    PVD (peripheral vascular disease)    Overview   5.1 Bilateral aortobifemoral bypass 6/2007; thrombectomy rt aortofemoral graft 11/2009           Relevant Orders    ECG 12 Lead    Duplex Lower Extremity Art / Grafts - Bilateral CAR     Other Visit Diagnoses         History of deep vein thrombosis (DVT) of lower extremity        Relevant Orders    ECG 12 Lead      Grade II diastolic dysfunction        Relevant Orders    ECG 12 Lead      Dizzy        Relevant Orders    ECG 12 Lead      Decreased dorsalis pedis pulse        Relevant Orders    Duplex Lower Extremity Art / Grafts - Bilateral CAR        1. Hyperlipidemia  2.Hypertension  2.1 echocardiogram 3/20: EF 56 to 60% grade 2 diastolic dysfunction no hemodynamically significant valvular disease  3.Grade 2 diastolic dysfunction  4.R leg swelling  4.1 arterial duplex of the lower extremities 12/19: 50% bilateral SFAs  5.Anticoagulated on eliquis  6.  Carotid duplex 2/20: Nonobstructive carotid artery disease  7.  Left heart cath 2007: Left main normal,, diagonal and LAD system normal, RCA normal, circumflex normal, EF 55 to 60%, LVEDP 12-16    HPI  HPI  Vannessa Contreras is a 59-year-old female patient who is being followed up today for chronic arterial hypertension and peripheral vascular disease.     Patient does have a history of chronic arterial hypertension which has been treated with amlodipine, losartan, hydrochlorothiazide, and metoprolol.  Today  her blood pressure is 112/55 heart rate is 68.  She does report some dizziness and lightheadedness that mainly occurs when her blood pressure is lower.    Patient does have extensive history of peripheral vascular disease in which she underwent aortobifem as well as a thrombectomy.  She is chronically anticoagulated on Eliquis.  She denies any claudication-like symptoms at this time.  She did have an arterial duplex that showed about 50% in her bilateral SFAs and 2019.    Did review most recent labs available to us which was performed in January of this year showed a normal CBC CMP and A1c of 5.9.  She does report that she recently had labs performed about 3 weeks ago by her PCP.  These are unavailable to us at this time.    Overall patient feels like she is doing well from a cardiovascular standpoint.  Denies any significant angina anginal equivalent symptoms.  Patient denies any chest pain, shortness of breath, palpitations, lower extremity edema, fatigue, syncope, PND, orthopnea, or strokelike symptoms.  PRIOR MEDS  Current Outpatient Medications on File Prior to Visit   Medication Sig Dispense Refill    apixaban (ELIQUIS) 5 MG tablet tablet Take 1 tablet by mouth 2 (Two) Times a Day. 180 tablet 3    aspirin (ASPIRIN LOW DOSE) 81 MG EC tablet Take 1 tablet by mouth 2 (Two) Times a Day.      atorvastatin (LIPITOR) 40 MG tablet Take 1 tablet by mouth every night at bedtime. 90 tablet 3    B Complex-C-Folic Acid (EN-GIOVANA PO) Take  by mouth.      furosemide (LASIX) 20 MG tablet Take 1 tablet by mouth 2 (Two) Times a Day. 180 tablet 3    losartan (Cozaar) 100 MG tablet Take 1 tablet by mouth Daily. 90 tablet 3    metoprolol tartrate (LOPRESSOR) 50 MG tablet Take 1 tablet by mouth 2 (Two) Times a Day. 90 tablet 3    Multiple Vitamins-Minerals (MULTIVITAMIN ADULT PO) Take  by mouth Daily.      nitroglycerin (NITROSTAT) 0.4 MG SL tablet Place 1 tablet under the tongue Every 5 (Five) Minutes As Needed.      Omega-3  Fatty Acids (EQL FISH OIL) 1200 MG capsule Take 1 tablet by mouth 2 (Two) Times a Day.      potassium chloride 10 MEQ CR tablet Take 1 tablet by mouth Daily. 90 tablet 3    Semaglutide,0.25 or 0.5MG/DOS, (Ozempic, 0.25 or 0.5 MG/DOSE,) 2 MG/3ML solution pen-injector Inject 0.25 mg under the skin into the appropriate area as directed 1 (One) Time Per Week. 3 mL 11    [DISCONTINUED] amLODIPine (NORVASC) 10 MG tablet Take 1 tablet by mouth Daily. 90 tablet 3    [DISCONTINUED] cetirizine (zyrTEC) 10 MG tablet Take 1 tablet by mouth Daily.       No current facility-administered medications on file prior to visit.       ALLERGIES  Patient has no known allergies.    HISTORY  Past Medical History:   Diagnosis Date    Arterial insufficiency     Atherosclerosis of arteries of extremities     CAD (coronary artery disease)     DVT (deep venous thrombosis)     h.o - on coumadin     Fatigue     Hyperlipidemia     Hypertension     Palpitations     Prediabetes     PVD (peripheral vascular disease)     SOB (shortness of breath)        Social History     Socioeconomic History    Marital status:    Tobacco Use    Smoking status: Every Day     Current packs/day: 1.00     Types: Cigarettes    Smokeless tobacco: Never   Vaping Use    Vaping status: Never Used   Substance and Sexual Activity    Alcohol use: Not Currently     Comment: Social     Drug use: Never    Sexual activity: Defer       Family History   Problem Relation Age of Onset    Atrial fibrillation Mother     Heart attack Father     Diabetes Sister     Hypertension Sister     Stroke Maternal Grandmother     Peripheral vascular disease Other        Review of Systems   Constitutional:  Positive for fatigue.   Respiratory:  Negative for apnea, chest tightness and shortness of breath.    Cardiovascular:  Negative for chest pain, palpitations and leg swelling.   Gastrointestinal:  Negative for constipation, diarrhea and nausea.   Neurological:  Positive for light-headedness  "(random / moving). Negative for dizziness, syncope and weakness.   Hematological:  Does not bruise/bleed easily.   Psychiatric/Behavioral:  Negative for sleep disturbance.        Objective     VITALS: /55 (BP Location: Left arm, Patient Position: Sitting, Cuff Size: Adult)   Pulse 68   Ht 167.6 cm (65.98\")   Wt 94.5 kg (208 lb 6.4 oz)   SpO2 96%   BMI 33.65 kg/m²     LABS:   Lab Results (most recent)       None            IMAGING:   No Images in the past 120 days found..    EXAM:  Physical Exam  Vitals and nursing note reviewed.   Constitutional:       Appearance: She is well-developed.   HENT:      Head: Normocephalic.   Neck:      Thyroid: No thyroid mass.      Vascular: No carotid bruit or JVD.      Trachea: Trachea and phonation normal.   Cardiovascular:      Rate and Rhythm: Normal rate and regular rhythm.      Pulses:           Radial pulses are 2+ on the right side and 2+ on the left side.        Dorsalis pedis pulses are 2+ on the right side and 1+ on the left side.      Heart sounds: Normal heart sounds. No murmur heard.     No friction rub. No gallop.   Pulmonary:      Effort: Pulmonary effort is normal. No respiratory distress.      Breath sounds: Normal breath sounds. No wheezing or rales.   Musculoskeletal:         General: No swelling. Normal range of motion.      Cervical back: Neck supple.   Skin:     General: Skin is warm and dry.      Capillary Refill: Capillary refill takes less than 2 seconds.      Findings: No rash.   Neurological:      Mental Status: She is alert and oriented to person, place, and time.   Psychiatric:         Speech: Speech normal.         Behavior: Behavior normal.         Thought Content: Thought content normal.         Judgment: Judgment normal.         Procedure     ECG 12 Lead    Date/Time: 7/22/2025 10:19 AM  Performed by: Rush Cruz APRN    Authorized by: Rush Cruz APRN  Comparison: compared with previous ECG from 10/17/2023  Rhythm: sinus " rhythm  Rate: normal  BPM: 68  QRS axis: normal  Comments: Qtc 442 ms  No acute changes             Assessment & Plan    Diagnosis Plan   1. Primary hypertension  ECG 12 Lead    amLODIPine (NORVASC) 5 MG tablet      2. Mixed hyperlipidemia  ECG 12 Lead      3. History of deep vein thrombosis (DVT) of lower extremity  ECG 12 Lead      4. Grade II diastolic dysfunction  ECG 12 Lead      5. PVD (peripheral vascular disease)  ECG 12 Lead    Duplex Lower Extremity Art / Grafts - Bilateral CAR      6. Dizzy  ECG 12 Lead      7. Decreased dorsalis pedis pulse  Duplex Lower Extremity Art / Grafts - Bilateral CAR      1.  Patient's blood pressure is controlled on current blood pressure medication regimen.  However due to periods of hypotension we will decrease her amlodipine to 5 mg daily..  Patient advised to monitor blood pressure on a daily basis and report any persistent highs or lows.  Set goal blood pressure for patient at 130/80 or below.  2.  Patient does have a history of peripheral vascular disease in which her pulses are weaker on the left than the right.  Also some discoloration noted.  Therefore I would like to get a arterial duplex of her bilateral lower extremities for reevaluation of her peripheral vascular disease.  3.  Patient did have recent labs done by her PCP and which we will request to follow alongside.  She will continue her medications at this time.  However if her LDL is above 70 due to her advanced peripheral vascular disease we will consider PSK 9 inhibitor therapy.  4.  Informed of signs and symptoms of ACS and advised to seek emergent treatment for any new worsening symptoms.  Patient also advised sooner follow-up as needed.  Also advised to follow-up with family doctor as needed  This note is dictated utilizing voice recognition software.  Although this record has been proof read, transcriptional errors may still be present. If questions occur regarding the content of this record please do not  hesitate to call our office.  I have reviewed and confirmed the accuracy of the ROS as documented by the MA/LPN/RN JAMAAL Salazar    Return in about 6 months (around 1/22/2026), or if symptoms worsen or fail to improve.    Diagnoses and all orders for this visit:    1. Primary hypertension (Primary)  -     ECG 12 Lead  -     amLODIPine (NORVASC) 5 MG tablet; Take 1 tablet by mouth Daily.  Dispense: 90 tablet; Refill: 3    2. Mixed hyperlipidemia  -     ECG 12 Lead    3. History of deep vein thrombosis (DVT) of lower extremity  -     ECG 12 Lead    4. Grade II diastolic dysfunction  -     ECG 12 Lead    5. PVD (peripheral vascular disease)  -     ECG 12 Lead  -     Duplex Lower Extremity Art / Grafts - Bilateral CAR; Future    6. Dizzy  -     ECG 12 Lead    7. Decreased dorsalis pedis pulse  -     Duplex Lower Extremity Art / Grafts - Bilateral CAR; Future        Vannessa Contreras  reports that she has been smoking cigarettes. She has never used smokeless tobacco. I have educated her on the risk of diseases from using tobacco products such as cancer, COPD, and heart disease.     I advised her to quit and she is not willing to quit.    I spent 5.5 minutes counseling the patient.                         MEDS ORDERED DURING VISIT:  New Medications Ordered This Visit   Medications    amLODIPine (NORVASC) 5 MG tablet     Sig: Take 1 tablet by mouth Daily.     Dispense:  90 tablet     Refill:  3           This document has been electronically signed by JAMAAL Salazar Jr.  July 22, 2025 12:44 EDT

## 2025-07-31 ENCOUNTER — HOSPITAL ENCOUNTER (OUTPATIENT)
Dept: CARDIOLOGY | Facility: HOSPITAL | Age: 60
Discharge: HOME | End: 2025-07-31
Admitting: NURSE PRACTITIONER
Payer: MEDICARE

## 2025-07-31 DIAGNOSIS — I73.9 PVD (PERIPHERAL VASCULAR DISEASE): ICD-10-CM

## 2025-07-31 DIAGNOSIS — R09.89 DECREASED DORSALIS PEDIS PULSE: ICD-10-CM

## 2025-07-31 LAB
BH CV LEA LEFT ANT TIBIAL A DISTAL PSV: 46 CM/S
BH CV LEA LEFT CFA PROX PSV: 269 CM/S
BH CV LEA LEFT POPITEAL A  PROX PSV: 57 CM/S
BH CV LEA LEFT PTA DISTAL PSV: 84 CM/S
BH CV LEA LEFT SFA DISTAL PSV: -64.1 CM/S
BH CV LEA LEFT SFA MID PSV: -95.3 CM/S
BH CV LEA LEFT SFA PROX PSV: -91.1 CM/S
BH CV LEA RIGHT ANT TIBIAL A DISTAL PSV: 78 CM/S
BH CV LEA RIGHT CFA PROX PSV: 227 CM/S
BH CV LEA RIGHT DFA PROX PSV: 184 CM/S
BH CV LEA RIGHT POPITEAL A  PROX PSV: 60 CM/S
BH CV LEA RIGHT PTA DISTAL PSV: 86 CM/S
BH CV LEA RIGHT SFA DISTAL PSV: -76 CM/S
BH CV LEA RIGHT SFA MID PSV: -123 CM/S
BH CV LEA RIGHT SFA PROX PSV: -123 CM/S
LEFT GROIN CFA SYS: 269 CM/SEC
RIGHT GROIN CFA SYS: 227 CM/SEC

## 2025-07-31 PROCEDURE — 93925 LOWER EXTREMITY STUDY: CPT

## 2025-08-12 ENCOUNTER — RESULTS FOLLOW-UP (OUTPATIENT)
Dept: CARDIOLOGY | Facility: CLINIC | Age: 60
End: 2025-08-12
Payer: MEDICARE
